# Patient Record
Sex: FEMALE | Race: WHITE | Employment: UNEMPLOYED | URBAN - METROPOLITAN AREA
[De-identification: names, ages, dates, MRNs, and addresses within clinical notes are randomized per-mention and may not be internally consistent; named-entity substitution may affect disease eponyms.]

---

## 2017-01-03 ENCOUNTER — HOSPITAL ENCOUNTER (OUTPATIENT)
Dept: NON INVASIVE DIAGNOSTICS | Age: 52
Discharge: HOME | End: 2017-01-03
Attending: UROLOGY | Admitting: UROLOGY

## 2017-01-03 DIAGNOSIS — N20.1 CALCULUS OF URETER: ICD-10-CM

## 2017-01-03 DIAGNOSIS — N20.0 CALCULUS OF KIDNEY: ICD-10-CM

## 2017-01-04 RX ORDER — LEVOTHYROXINE SODIUM 0.12 MG/1
TABLET ORAL
Qty: 90 TABLET | Refills: 0 | Status: SHIPPED | OUTPATIENT
Start: 2017-01-04 | End: 2017-04-12 | Stop reason: SDUPTHER

## 2017-01-05 ENCOUNTER — TELEPHONE (OUTPATIENT)
Dept: FAMILY MEDICINE CLINIC | Age: 52
End: 2017-01-05

## 2017-01-05 ENCOUNTER — OFFICE VISIT (OUTPATIENT)
Dept: FAMILY MEDICINE CLINIC | Age: 52
End: 2017-01-05

## 2017-01-05 VITALS
SYSTOLIC BLOOD PRESSURE: 160 MMHG | HEIGHT: 67 IN | DIASTOLIC BLOOD PRESSURE: 106 MMHG | HEART RATE: 72 BPM | TEMPERATURE: 97.9 F | WEIGHT: 196.2 LBS | BODY MASS INDEX: 30.79 KG/M2

## 2017-01-05 DIAGNOSIS — R00.2 PALPITATIONS: ICD-10-CM

## 2017-01-05 DIAGNOSIS — I10 ESSENTIAL HYPERTENSION: Primary | ICD-10-CM

## 2017-01-05 PROCEDURE — 93000 ELECTROCARDIOGRAM COMPLETE: CPT | Performed by: FAMILY MEDICINE

## 2017-01-05 PROCEDURE — 99213 OFFICE O/P EST LOW 20 MIN: CPT | Performed by: FAMILY MEDICINE

## 2017-01-05 ASSESSMENT — ENCOUNTER SYMPTOMS
EYE PAIN: 0
SHORTNESS OF BREATH: 0
VOMITING: 0
NAUSEA: 0
ABDOMINAL PAIN: 0

## 2017-01-31 ENCOUNTER — TELEPHONE (OUTPATIENT)
Dept: FAMILY MEDICINE CLINIC | Age: 52
End: 2017-01-31

## 2017-02-06 ENCOUNTER — OFFICE VISIT (OUTPATIENT)
Dept: FAMILY MEDICINE CLINIC | Age: 52
End: 2017-02-06

## 2017-02-06 VITALS
BODY MASS INDEX: 30.95 KG/M2 | HEIGHT: 67 IN | WEIGHT: 197.2 LBS | SYSTOLIC BLOOD PRESSURE: 136 MMHG | TEMPERATURE: 97.9 F | DIASTOLIC BLOOD PRESSURE: 80 MMHG | HEART RATE: 84 BPM

## 2017-02-06 DIAGNOSIS — J34.0 CELLULITIS OF NASAL TIP: ICD-10-CM

## 2017-02-06 DIAGNOSIS — I10 ESSENTIAL HYPERTENSION: Primary | ICD-10-CM

## 2017-02-06 PROCEDURE — 99213 OFFICE O/P EST LOW 20 MIN: CPT | Performed by: FAMILY MEDICINE

## 2017-02-06 RX ORDER — AMOXICILLIN AND CLAVULANATE POTASSIUM 875; 125 MG/1; MG/1
1 TABLET, FILM COATED ORAL 2 TIMES DAILY
Qty: 20 TABLET | Refills: 0 | Status: SHIPPED | OUTPATIENT
Start: 2017-02-06 | End: 2017-02-16

## 2017-02-06 RX ORDER — METOPROLOL TARTRATE 50 MG/1
50 TABLET, FILM COATED ORAL 2 TIMES DAILY
Qty: 180 TABLET | Refills: 0 | Status: SHIPPED | OUTPATIENT
Start: 2017-02-06 | End: 2017-05-15 | Stop reason: SDUPTHER

## 2017-02-06 ASSESSMENT — ENCOUNTER SYMPTOMS
SORE THROAT: 0
SHORTNESS OF BREATH: 0
NAUSEA: 0
VOMITING: 0
SINUS PRESSURE: 0
ABDOMINAL PAIN: 0
EYE PAIN: 0

## 2017-02-16 RX ORDER — PRAVASTATIN SODIUM 40 MG
TABLET ORAL
Qty: 90 TABLET | Refills: 0 | Status: SHIPPED | OUTPATIENT
Start: 2017-02-16 | End: 2017-05-15 | Stop reason: SDUPTHER

## 2017-03-09 ENCOUNTER — TELEPHONE (OUTPATIENT)
Dept: FAMILY MEDICINE CLINIC | Age: 52
End: 2017-03-09

## 2017-04-13 RX ORDER — LEVOTHYROXINE SODIUM 0.12 MG/1
TABLET ORAL
Qty: 90 TABLET | Refills: 0 | Status: SHIPPED | OUTPATIENT
Start: 2017-04-13 | End: 2017-07-05 | Stop reason: SDUPTHER

## 2017-05-15 RX ORDER — METOPROLOL TARTRATE 50 MG/1
TABLET, FILM COATED ORAL
Qty: 180 TABLET | Refills: 0 | Status: SHIPPED | OUTPATIENT
Start: 2017-05-15 | End: 2017-09-12 | Stop reason: SDUPTHER

## 2017-05-15 RX ORDER — PRAVASTATIN SODIUM 40 MG
TABLET ORAL
Qty: 90 TABLET | Refills: 0 | Status: SHIPPED | OUTPATIENT
Start: 2017-05-15 | End: 2017-08-10 | Stop reason: SDUPTHER

## 2017-07-05 RX ORDER — LEVOTHYROXINE SODIUM 0.12 MG/1
TABLET ORAL
Qty: 90 TABLET | Refills: 0 | Status: SHIPPED | OUTPATIENT
Start: 2017-07-05 | End: 2017-09-28 | Stop reason: SDUPTHER

## 2017-07-13 ENCOUNTER — OFFICE VISIT (OUTPATIENT)
Dept: FAMILY MEDICINE CLINIC | Age: 52
End: 2017-07-13

## 2017-07-13 VITALS
HEIGHT: 67 IN | DIASTOLIC BLOOD PRESSURE: 86 MMHG | WEIGHT: 197.4 LBS | SYSTOLIC BLOOD PRESSURE: 134 MMHG | TEMPERATURE: 97.7 F | HEART RATE: 72 BPM | BODY MASS INDEX: 30.98 KG/M2

## 2017-07-13 DIAGNOSIS — J01.00 ACUTE MAXILLARY SINUSITIS, RECURRENCE NOT SPECIFIED: Primary | ICD-10-CM

## 2017-07-13 PROCEDURE — 99213 OFFICE O/P EST LOW 20 MIN: CPT | Performed by: FAMILY MEDICINE

## 2017-07-13 RX ORDER — AMOXICILLIN AND CLAVULANATE POTASSIUM 875; 125 MG/1; MG/1
1 TABLET, FILM COATED ORAL 2 TIMES DAILY
Qty: 20 TABLET | Refills: 0 | Status: SHIPPED | OUTPATIENT
Start: 2017-07-13 | End: 2017-07-23

## 2017-07-13 RX ORDER — METHYLPREDNISOLONE 4 MG/1
TABLET ORAL
Qty: 1 KIT | Refills: 0 | Status: SHIPPED | OUTPATIENT
Start: 2017-07-13 | End: 2017-07-19

## 2017-07-13 ASSESSMENT — ENCOUNTER SYMPTOMS
WHEEZING: 0
SHORTNESS OF BREATH: 0
SINUS PRESSURE: 1
RHINORRHEA: 1
CHEST TIGHTNESS: 0
COUGH: 0

## 2017-08-10 RX ORDER — PRAVASTATIN SODIUM 40 MG
TABLET ORAL
Qty: 90 TABLET | Refills: 0 | Status: SHIPPED | OUTPATIENT
Start: 2017-08-10 | End: 2018-02-08 | Stop reason: ALTCHOICE

## 2017-09-07 ENCOUNTER — HOSPITAL ENCOUNTER (OUTPATIENT)
Dept: NON INVASIVE DIAGNOSTICS | Age: 52
Discharge: HOME | End: 2017-09-07
Attending: UROLOGY | Admitting: UROLOGY

## 2017-09-07 DIAGNOSIS — N20.0 CALCULUS OF KIDNEY: ICD-10-CM

## 2017-09-12 RX ORDER — METOPROLOL TARTRATE 50 MG/1
TABLET, FILM COATED ORAL
Qty: 180 TABLET | Refills: 0 | Status: SHIPPED | OUTPATIENT
Start: 2017-09-12 | End: 2017-12-06 | Stop reason: SDUPTHER

## 2017-09-28 DIAGNOSIS — R73.03 PREDIABETES: ICD-10-CM

## 2017-09-28 DIAGNOSIS — E78.5 HYPERLIPIDEMIA, UNSPECIFIED HYPERLIPIDEMIA TYPE: Primary | ICD-10-CM

## 2017-09-28 DIAGNOSIS — E03.9 HYPOTHYROIDISM, UNSPECIFIED TYPE: ICD-10-CM

## 2017-09-28 DIAGNOSIS — Z11.4 ENCOUNTER FOR SCREENING FOR HIV: ICD-10-CM

## 2017-09-29 RX ORDER — LEVOTHYROXINE SODIUM 0.12 MG/1
TABLET ORAL
Qty: 90 TABLET | Refills: 0 | Status: SHIPPED | OUTPATIENT
Start: 2017-09-29 | End: 2017-12-28 | Stop reason: SDUPTHER

## 2017-10-04 DIAGNOSIS — Z11.4 ENCOUNTER FOR SCREENING FOR HIV: ICD-10-CM

## 2017-10-04 DIAGNOSIS — R73.03 PREDIABETES: ICD-10-CM

## 2017-10-04 DIAGNOSIS — E78.5 HYPERLIPIDEMIA, UNSPECIFIED HYPERLIPIDEMIA TYPE: ICD-10-CM

## 2017-10-04 DIAGNOSIS — E03.9 HYPOTHYROIDISM, UNSPECIFIED TYPE: ICD-10-CM

## 2017-10-04 LAB
A/G RATIO: 1.5 (ref 1.1–2.2)
ALBUMIN SERPL-MCNC: 3.9 G/DL (ref 3.4–5)
ALP BLD-CCNC: 156 U/L (ref 40–129)
ALT SERPL-CCNC: 82 U/L (ref 10–40)
ANION GAP SERPL CALCULATED.3IONS-SCNC: 16 MMOL/L (ref 3–16)
AST SERPL-CCNC: 36 U/L (ref 15–37)
BILIRUB SERPL-MCNC: 0.8 MG/DL (ref 0–1)
BUN BLDV-MCNC: 14 MG/DL (ref 7–20)
CALCIUM SERPL-MCNC: 9.2 MG/DL (ref 8.3–10.6)
CHLORIDE BLD-SCNC: 96 MMOL/L (ref 99–110)
CHOLESTEROL, TOTAL: 184 MG/DL (ref 0–199)
CO2: 25 MMOL/L (ref 21–32)
CREAT SERPL-MCNC: 0.8 MG/DL (ref 0.6–1.1)
GFR AFRICAN AMERICAN: >60
GFR NON-AFRICAN AMERICAN: >60
GLOBULIN: 2.6 G/DL
GLUCOSE BLD-MCNC: 122 MG/DL (ref 70–99)
HDLC SERPL-MCNC: 32 MG/DL (ref 40–60)
LDL CHOLESTEROL CALCULATED: 99 MG/DL
POTASSIUM SERPL-SCNC: 4.6 MMOL/L (ref 3.5–5.1)
SODIUM BLD-SCNC: 137 MMOL/L (ref 136–145)
TOTAL PROTEIN: 6.5 G/DL (ref 6.4–8.2)
TRIGL SERPL-MCNC: 263 MG/DL (ref 0–150)
TSH REFLEX: 4.16 UIU/ML (ref 0.27–4.2)
VLDLC SERPL CALC-MCNC: 53 MG/DL

## 2017-10-05 LAB
ESTIMATED AVERAGE GLUCOSE: 131.2 MG/DL
HBA1C MFR BLD: 6.2 %
HIV-1 AND HIV-2 ANTIBODIES: NORMAL

## 2017-10-10 ENCOUNTER — HOSPITAL ENCOUNTER (OUTPATIENT)
Dept: NON INVASIVE DIAGNOSTICS | Age: 52
Discharge: HOME | End: 2017-10-10
Attending: FAMILY MEDICINE | Admitting: FAMILY MEDICINE

## 2017-10-10 ENCOUNTER — OFFICE VISIT (OUTPATIENT)
Dept: FAMILY MEDICINE CLINIC | Age: 52
End: 2017-10-10

## 2017-10-10 VITALS
BODY MASS INDEX: 31.11 KG/M2 | HEART RATE: 68 BPM | TEMPERATURE: 97.8 F | DIASTOLIC BLOOD PRESSURE: 84 MMHG | SYSTOLIC BLOOD PRESSURE: 126 MMHG | WEIGHT: 198.2 LBS | HEIGHT: 67 IN

## 2017-10-10 DIAGNOSIS — I10 ESSENTIAL HYPERTENSION: ICD-10-CM

## 2017-10-10 DIAGNOSIS — R79.89 ELEVATED LFTS: ICD-10-CM

## 2017-10-10 DIAGNOSIS — M79.641 BILATERAL HAND PAIN: ICD-10-CM

## 2017-10-10 DIAGNOSIS — M79.642 BILATERAL HAND PAIN: ICD-10-CM

## 2017-10-10 DIAGNOSIS — R73.03 PREDIABETES: ICD-10-CM

## 2017-10-10 DIAGNOSIS — E03.9 ACQUIRED HYPOTHYROIDISM: ICD-10-CM

## 2017-10-10 DIAGNOSIS — Z23 NEED FOR PROPHYLACTIC VACCINATION AGAINST DIPHTHERIA-TETANUS-PERTUSSIS (DTP): ICD-10-CM

## 2017-10-10 DIAGNOSIS — Z00.00 ROUTINE GENERAL MEDICAL EXAMINATION AT A HEALTH CARE FACILITY: Primary | ICD-10-CM

## 2017-10-10 DIAGNOSIS — E78.2 MIXED HYPERLIPIDEMIA: ICD-10-CM

## 2017-10-10 DIAGNOSIS — K76.0 FATTY LIVER DISEASE, NONALCOHOLIC: ICD-10-CM

## 2017-10-10 PROBLEM — N20.0 KIDNEY STONES: Status: ACTIVE | Noted: 2017-10-10

## 2017-10-10 PROCEDURE — 99396 PREV VISIT EST AGE 40-64: CPT | Performed by: FAMILY MEDICINE

## 2017-10-10 PROCEDURE — 90715 TDAP VACCINE 7 YRS/> IM: CPT | Performed by: FAMILY MEDICINE

## 2017-10-10 PROCEDURE — 90471 IMMUNIZATION ADMIN: CPT | Performed by: FAMILY MEDICINE

## 2017-10-10 RX ORDER — NITROFURANTOIN MACROCRYSTALS 50 MG/1
CAPSULE ORAL
COMMUNITY
Start: 2017-10-04 | End: 2018-02-15 | Stop reason: ALTCHOICE

## 2017-10-10 ASSESSMENT — ENCOUNTER SYMPTOMS
DIARRHEA: 0
WHEEZING: 0
COUGH: 0
EYE REDNESS: 0
NAUSEA: 0
CONSTIPATION: 0
EYE PAIN: 0
SHORTNESS OF BREATH: 0
ABDOMINAL PAIN: 0
SINUS PRESSURE: 0
COLOR CHANGE: 0
CHEST TIGHTNESS: 0
EYE DISCHARGE: 0
VOMITING: 0
RHINORRHEA: 0
BLOOD IN STOOL: 0

## 2017-10-10 ASSESSMENT — PATIENT HEALTH QUESTIONNAIRE - PHQ9
1. LITTLE INTEREST OR PLEASURE IN DOING THINGS: 0
SUM OF ALL RESPONSES TO PHQ9 QUESTIONS 1 & 2: 0
2. FEELING DOWN, DEPRESSED OR HOPELESS: 0
SUM OF ALL RESPONSES TO PHQ QUESTIONS 1-9: 0

## 2017-10-10 NOTE — PROGRESS NOTES
Subjective:      Patient ID: Meghana Castillo is a 45 y.o. female. HPI  Chief Complaint   Patient presents with    Annual Exam     annual physical exam    Hand Pain     b/l hand/wrist pain x 2 years. aware of addt charge. Here for CPE. Non smoker. Taking all meds. utd on dental and vision exams and gyn exam  Seeing urology- has a stuck kidney stone. No exercise  Takes all meds  No weight change  C/o hand pain on and off x 2yrs. Happens when plays piano. Pain in all fingers and down into wrist- swells at times. Denies numbness or tingling or hands falling asleep  Meghana Castillo is a 45 y.o. female with the following history as recorded in [facility]:  Patient Active Problem List    Diagnosis Date Noted    Prediabetes 05/28/2015    RUQ pain 10/30/2014    Routine general medical examination at a health care facility 11/13/2013    Vitamin D deficiency 04/16/2013    DDD (degenerative disc disease), lumbar 03/30/2011    Fatty liver disease, nonalcoholic 92/49/5322    Hypothyroid     Hyperlipidemia     AR (allergic rhinitis)     Migraine      Current Outpatient Prescriptions   Medication Sig Dispense Refill    nitrofurantoin (MACRODANTIN) 50 MG capsule       levothyroxine (SYNTHROID) 125 MCG tablet TAKE 1 TABLET BY MOUTH EVERY DAY 90 tablet 0    metoprolol tartrate (LOPRESSOR) 50 MG tablet TAKE 1 TABLET BY MOUTH TWICE DAILY 180 tablet 0    pravastatin (PRAVACHOL) 40 MG tablet TAKE 1 TABLET BY MOUTH EVERY DAY 90 tablet 0    triamcinolone (NASACORT ALLERGY 24HR) 55 MCG/ACT nasal inhaler 2 sprays by Nasal route daily      Cholecalciferol (VITAMIN D PO) Take  by mouth daily.  aspirin 81 MG tablet Take 81 mg by mouth daily       No current facility-administered medications for this visit. Allergies: Niaspan [niacin er]; Percocet [oxycodone-acetaminophen]; Skelaxin [metaxalone];  Sulfamethoxazole-trimethoprim; Ciprofloxacin; and Omnicef  Past Medical History:   Diagnosis Date    Abdominal pain,

## 2017-10-10 NOTE — PATIENT INSTRUCTIONS
Please call your pharmacy if you need any refills of your medication(s). Please call our office at [phone] if you don't hear from us about your test results. Bring an accurate list of your medications with you at every appointment to ensure that we have the correct information. Our office hours are: Monday - Friday 7 am- 5 pm; Saturdays vary on doctor working.     Phone lines turn on at 8 am.

## 2017-11-08 RX ORDER — PRAVASTATIN SODIUM 40 MG
TABLET ORAL
Qty: 90 TABLET | Refills: 0 | Status: SHIPPED | OUTPATIENT
Start: 2017-11-08 | End: 2018-02-08 | Stop reason: ALTCHOICE

## 2017-12-07 RX ORDER — METOPROLOL TARTRATE 50 MG/1
TABLET, FILM COATED ORAL
Qty: 180 TABLET | Refills: 0 | Status: SHIPPED | OUTPATIENT
Start: 2017-12-07 | End: 2018-05-28 | Stop reason: SDUPTHER

## 2017-12-09 ENCOUNTER — TELEPHONE (OUTPATIENT)
Dept: INTERNAL MEDICINE CLINIC | Age: 52
End: 2017-12-09

## 2017-12-28 ENCOUNTER — TELEPHONE (OUTPATIENT)
Dept: FAMILY MEDICINE CLINIC | Age: 52
End: 2017-12-28

## 2017-12-28 RX ORDER — LEVOTHYROXINE SODIUM 0.12 MG/1
TABLET ORAL
Qty: 90 TABLET | Refills: 0 | Status: SHIPPED | OUTPATIENT
Start: 2017-12-28 | End: 2018-03-28 | Stop reason: SDUPTHER

## 2017-12-28 NOTE — TELEPHONE ENCOUNTER
Pt was under Dr Kendrick cavanaugh and you see her   Christal Corcoran [birth year], would you be willing to see her also.      Please advise , [phone]

## 2018-02-08 RX ORDER — PRAVASTATIN SODIUM 40 MG
TABLET ORAL
Qty: 90 TABLET | Refills: 1 | Status: SHIPPED | OUTPATIENT
Start: 2018-02-08 | End: 2018-07-30 | Stop reason: SDUPTHER

## 2018-02-15 ENCOUNTER — OFFICE VISIT (OUTPATIENT)
Dept: FAMILY MEDICINE CLINIC | Age: 53
End: 2018-02-15

## 2018-02-15 VITALS
DIASTOLIC BLOOD PRESSURE: 84 MMHG | HEIGHT: 67 IN | TEMPERATURE: 97.8 F | BODY MASS INDEX: 31.33 KG/M2 | SYSTOLIC BLOOD PRESSURE: 122 MMHG | WEIGHT: 199.6 LBS

## 2018-02-15 DIAGNOSIS — E55.9 VITAMIN D DEFICIENCY: ICD-10-CM

## 2018-02-15 DIAGNOSIS — G47.9 SLEEP DISORDER: ICD-10-CM

## 2018-02-15 DIAGNOSIS — E78.2 MIXED HYPERLIPIDEMIA: Primary | ICD-10-CM

## 2018-02-15 DIAGNOSIS — K76.0 FATTY LIVER DISEASE, NONALCOHOLIC: ICD-10-CM

## 2018-02-15 DIAGNOSIS — R73.09 ELEVATED GLUCOSE: ICD-10-CM

## 2018-02-15 DIAGNOSIS — I10 ESSENTIAL HYPERTENSION: ICD-10-CM

## 2018-02-15 PROCEDURE — 3014F SCREEN MAMMO DOC REV: CPT | Performed by: FAMILY MEDICINE

## 2018-02-15 PROCEDURE — 99214 OFFICE O/P EST MOD 30 MIN: CPT | Performed by: FAMILY MEDICINE

## 2018-02-15 PROCEDURE — G8427 DOCREV CUR MEDS BY ELIG CLIN: HCPCS | Performed by: FAMILY MEDICINE

## 2018-02-15 PROCEDURE — 1036F TOBACCO NON-USER: CPT | Performed by: FAMILY MEDICINE

## 2018-02-15 PROCEDURE — G8484 FLU IMMUNIZE NO ADMIN: HCPCS | Performed by: FAMILY MEDICINE

## 2018-02-15 PROCEDURE — 3017F COLORECTAL CA SCREEN DOC REV: CPT | Performed by: FAMILY MEDICINE

## 2018-02-15 PROCEDURE — G8417 CALC BMI ABV UP PARAM F/U: HCPCS | Performed by: FAMILY MEDICINE

## 2018-02-15 ASSESSMENT — ENCOUNTER SYMPTOMS
CONSTIPATION: 0
VOMITING: 0
ABDOMINAL PAIN: 0
COUGH: 0
CHEST TIGHTNESS: 0
BLOOD IN STOOL: 0
DIARRHEA: 0
ABDOMINAL DISTENTION: 0
NAUSEA: 0
SHORTNESS OF BREATH: 0

## 2018-02-22 ENCOUNTER — OFFICE VISIT (OUTPATIENT)
Dept: SLEEP MEDICINE | Age: 53
End: 2018-02-22

## 2018-02-22 VITALS
SYSTOLIC BLOOD PRESSURE: 138 MMHG | RESPIRATION RATE: 16 BRPM | DIASTOLIC BLOOD PRESSURE: 84 MMHG | OXYGEN SATURATION: 96 % | BODY MASS INDEX: 31.3 KG/M2 | TEMPERATURE: 98 F | HEART RATE: 84 BPM | WEIGHT: 199.4 LBS | HEIGHT: 67 IN

## 2018-02-22 DIAGNOSIS — G47.33 OSA (OBSTRUCTIVE SLEEP APNEA): Primary | ICD-10-CM

## 2018-02-22 PROCEDURE — 3017F COLORECTAL CA SCREEN DOC REV: CPT | Performed by: PSYCHIATRY & NEUROLOGY

## 2018-02-22 PROCEDURE — 1036F TOBACCO NON-USER: CPT | Performed by: PSYCHIATRY & NEUROLOGY

## 2018-02-22 PROCEDURE — G8427 DOCREV CUR MEDS BY ELIG CLIN: HCPCS | Performed by: PSYCHIATRY & NEUROLOGY

## 2018-02-22 PROCEDURE — 3014F SCREEN MAMMO DOC REV: CPT | Performed by: PSYCHIATRY & NEUROLOGY

## 2018-02-22 PROCEDURE — G8484 FLU IMMUNIZE NO ADMIN: HCPCS | Performed by: PSYCHIATRY & NEUROLOGY

## 2018-02-22 PROCEDURE — G8417 CALC BMI ABV UP PARAM F/U: HCPCS | Performed by: PSYCHIATRY & NEUROLOGY

## 2018-02-22 PROCEDURE — 99204 OFFICE O/P NEW MOD 45 MIN: CPT | Performed by: PSYCHIATRY & NEUROLOGY

## 2018-02-22 ASSESSMENT — SLEEP AND FATIGUE QUESTIONNAIRES
HOW LIKELY ARE YOU TO NOD OFF OR FALL ASLEEP WHILE SITTING AND READING: 3
HOW LIKELY ARE YOU TO NOD OFF OR FALL ASLEEP WHILE LYING DOWN TO REST IN THE AFTERNOON WHEN CIRCUMSTANCES PERMIT: 3
HOW LIKELY ARE YOU TO NOD OFF OR FALL ASLEEP WHILE WATCHING TV: 2
HOW LIKELY ARE YOU TO NOD OFF OR FALL ASLEEP WHILE SITTING INACTIVE IN A PUBLIC PLACE: 1
NECK CIRCUMFERENCE (INCHES): 16
HOW LIKELY ARE YOU TO NOD OFF OR FALL ASLEEP WHEN YOU ARE A PASSENGER IN A CAR FOR AN HOUR WITHOUT A BREAK: 1
HOW LIKELY ARE YOU TO NOD OFF OR FALL ASLEEP WHILE SITTING QUIETLY AFTER LUNCH WITHOUT ALCOHOL: 1
HOW LIKELY ARE YOU TO NOD OFF OR FALL ASLEEP WHILE SITTING AND TALKING TO SOMEONE: 0
HOW LIKELY ARE YOU TO NOD OFF OR FALL ASLEEP IN A CAR, WHILE STOPPED FOR A FEW MINUTES IN TRAFFIC: 0
ESS TOTAL SCORE: 11

## 2018-02-22 ASSESSMENT — ENCOUNTER SYMPTOMS
ALLERGIC/IMMUNOLOGIC NEGATIVE: 1
EYES NEGATIVE: 1
CHOKING: 1
GASTROINTESTINAL NEGATIVE: 1

## 2018-02-22 NOTE — PROGRESS NOTES
Ciprofloxacin Rash 03/06/2013    Omnicef Rash 03/30/2011       Patient Active Problem List   Diagnosis    Hypothyroid    Hyperlipidemia    AR (allergic rhinitis)    Migraine    DDD (degenerative disc disease), lumbar    Fatty liver disease, nonalcoholic    Vitamin D deficiency    Routine general medical examination at a health care facility    RUQ pain    Prediabetes    Essential hypertension    Kidney stones       Past Medical History:   Diagnosis Date    Abdominal pain, other specified site     AR (allergic rhinitis)     Diarrhea     Hyperlipidemia     Impaired fasting glucose     Migraine     Rectal bleeding     Reflux gastritis     Thyroid disease     hypothyroidism       Past Surgical History:   Procedure Laterality Date    CYSTOURETHROSCOPY/STENT REMOVAL      HYSTERECTOMY  8-    KIDNEY SURGERY      KNEE ARTHROSCOPY      RIGHT    LITHOTRIPSY      URETER STENT PLACEMENT      URETEROSCOPY  09/26/2017       Family History   Problem Relation Age of Onset    Adopted: Yes    Diabetes Mother     Heart Disease Father     Cancer Brother      colon       Review of Systems   Constitutional: Positive for fatigue and unexpected weight change (20 pounds weight gain in the last 5years). HENT: Positive for congestion. Eyes: Negative. Respiratory: Positive for choking. Cardiovascular: Negative. Gastrointestinal: Negative. Endocrine: Negative. Genitourinary: Positive for frequency (nighttime). Musculoskeletal: Positive for arthralgias and myalgias. Skin: Negative. Allergic/Immunologic: Negative. Neurological: Positive for headaches. Hematological: Negative. Psychiatric/Behavioral: Positive for agitation and dysphoric mood. The patient is nervous/anxious. All other systems reviewed and are negative.       Objective:     Vitals:  Weight BMI Neck circumference    Wt Readings from Last 3 Encounters:   02/22/18 199 lb 6.4 oz (90.4 kg)   02/15/18 199 lb 9.6 oz (90.5 kg)   10/10/17 198 lb 3.2 oz (89.9 kg)    Body mass index is 31.23 kg/m². Neck circumference: 16     BP HR SaO2   BP Readings from Last 3 Encounters:   02/22/18 138/84   02/15/18 122/84   10/10/17 126/84    Pulse Readings from Last 3 Encounters:   02/22/18 84   10/10/17 68   07/13/17 72    SpO2 Readings from Last 3 Encounters:   02/22/18 96%   06/14/16 98%   11/25/13 98%        The mandibular molar Class :   [x]1 []2 []3      Mallampati I Airway Classification:   []1 []2 [x]3 []4        Physical Exam   Constitutional: No distress. HENT:   Mallampati class 3, no retrognathia or hypognathia , normal airflow in bilateral nostrils, no septum deviation , crowded oropharynx with low soft palate, high arched hard palate,no tonsils enlargement. Eyes: EOM are normal.   Neck: Normal range of motion. Neck supple. No tracheal deviation present. No thyromegaly present. Cardiovascular: Normal rate, normal heart sounds and intact distal pulses. Pulmonary/Chest: Effort normal and breath sounds normal. No respiratory distress. She has no wheezes. Musculoskeletal: Normal range of motion. She exhibits no edema or tenderness. Neurological: She is alert. She has normal reflexes. Skin: Skin is warm. Psychiatric: She has a normal mood and affect. Nursing note and vitals reviewed. Assessment:    Obstructive sleep apnea especially with snoring, snorting,   daytime sleepiness, large neck circumference, Mallampati class of 3 and BMI 31            1. DAVID (obstructive sleep apnea)  Baseline Diagnostic Sleep Study    Sleep Study with PAP Titration     Plan:     Patient was counseled about the pathophysiology of obstructive sleep apnea syndrome and the methods for evaluating its presence and severity. Patient was counseled to avoid driving and other potentially hazardous circumstances if the patient is experiencing excessive sleepiness.   Treatment considerations include the use of nasal CPAP, oral dental appliance or a surgical intervention, which should be based on otolarygologic findings, In the meantime, the patient should be cautioned to avoid the use of alcohol or other depressant medications because of potential for increasing the duration and severity of apnea and cautioned regarding driving or operating and dangerous equipment if the patient is experiencing daytime sleepiness. .        Orders Placed This Encounter   Procedures    Baseline Diagnostic Sleep Study    Sleep Study with PAP Titration       Return in about 4 months (around 6/22/2018) for to review the PSG and CPAP usage.     Edmond Villalta MD  Medical Director [address] [place] [address]

## 2018-03-05 DIAGNOSIS — R73.09 ELEVATED GLUCOSE: ICD-10-CM

## 2018-03-05 DIAGNOSIS — E55.9 VITAMIN D DEFICIENCY: ICD-10-CM

## 2018-03-05 LAB
ESTIMATED AVERAGE GLUCOSE: 137 MG/DL
GLUCOSE BLD-MCNC: 136 MG/DL (ref 70–99)
HBA1C MFR BLD: 6.4 %
VITAMIN D 25-HYDROXY: 53.2 NG/ML

## 2018-04-17 ENCOUNTER — OFFICE VISIT (OUTPATIENT)
Dept: FAMILY MEDICINE CLINIC | Age: 53
End: 2018-04-17

## 2018-04-17 VITALS
WEIGHT: 201 LBS | HEIGHT: 67 IN | DIASTOLIC BLOOD PRESSURE: 84 MMHG | BODY MASS INDEX: 31.55 KG/M2 | SYSTOLIC BLOOD PRESSURE: 122 MMHG | TEMPERATURE: 97.5 F

## 2018-04-17 DIAGNOSIS — J01.10 ACUTE NON-RECURRENT FRONTAL SINUSITIS: Primary | ICD-10-CM

## 2018-04-17 PROCEDURE — 1036F TOBACCO NON-USER: CPT | Performed by: FAMILY MEDICINE

## 2018-04-17 PROCEDURE — G8427 DOCREV CUR MEDS BY ELIG CLIN: HCPCS | Performed by: FAMILY MEDICINE

## 2018-04-17 PROCEDURE — G8417 CALC BMI ABV UP PARAM F/U: HCPCS | Performed by: FAMILY MEDICINE

## 2018-04-17 PROCEDURE — 3014F SCREEN MAMMO DOC REV: CPT | Performed by: FAMILY MEDICINE

## 2018-04-17 PROCEDURE — 3017F COLORECTAL CA SCREEN DOC REV: CPT | Performed by: FAMILY MEDICINE

## 2018-04-17 PROCEDURE — 99213 OFFICE O/P EST LOW 20 MIN: CPT | Performed by: FAMILY MEDICINE

## 2018-04-17 RX ORDER — AMOXICILLIN AND CLAVULANATE POTASSIUM 500; 125 MG/1; MG/1
1 TABLET, FILM COATED ORAL 2 TIMES DAILY
Qty: 20 TABLET | Refills: 0 | Status: SHIPPED | OUTPATIENT
Start: 2018-04-17 | End: 2018-04-27

## 2018-05-29 RX ORDER — METOPROLOL TARTRATE 50 MG/1
TABLET, FILM COATED ORAL
Qty: 180 TABLET | Refills: 1 | Status: SHIPPED | OUTPATIENT
Start: 2018-05-29 | End: 2019-06-06 | Stop reason: SDUPTHER

## 2018-07-09 ENCOUNTER — OFFICE VISIT (OUTPATIENT)
Dept: FAMILY MEDICINE CLINIC | Age: 53
End: 2018-07-09

## 2018-07-09 ENCOUNTER — TELEPHONE (OUTPATIENT)
Dept: FAMILY MEDICINE CLINIC | Age: 53
End: 2018-07-09

## 2018-07-09 VITALS
BODY MASS INDEX: 31.83 KG/M2 | DIASTOLIC BLOOD PRESSURE: 82 MMHG | TEMPERATURE: 98 F | WEIGHT: 202.8 LBS | HEIGHT: 67 IN | SYSTOLIC BLOOD PRESSURE: 132 MMHG

## 2018-07-09 DIAGNOSIS — R73.03 PREDIABETES: ICD-10-CM

## 2018-07-09 DIAGNOSIS — E78.2 MIXED HYPERLIPIDEMIA: Primary | ICD-10-CM

## 2018-07-09 DIAGNOSIS — I10 ESSENTIAL HYPERTENSION: ICD-10-CM

## 2018-07-09 DIAGNOSIS — J06.9 UPPER RESPIRATORY TRACT INFECTION, UNSPECIFIED TYPE: Primary | ICD-10-CM

## 2018-07-09 DIAGNOSIS — E03.9 ACQUIRED HYPOTHYROIDISM: ICD-10-CM

## 2018-07-09 PROCEDURE — G8427 DOCREV CUR MEDS BY ELIG CLIN: HCPCS | Performed by: FAMILY MEDICINE

## 2018-07-09 PROCEDURE — 1036F TOBACCO NON-USER: CPT | Performed by: FAMILY MEDICINE

## 2018-07-09 PROCEDURE — G8417 CALC BMI ABV UP PARAM F/U: HCPCS | Performed by: FAMILY MEDICINE

## 2018-07-09 PROCEDURE — 99213 OFFICE O/P EST LOW 20 MIN: CPT | Performed by: FAMILY MEDICINE

## 2018-07-09 PROCEDURE — 3017F COLORECTAL CA SCREEN DOC REV: CPT | Performed by: FAMILY MEDICINE

## 2018-07-09 RX ORDER — AZITHROMYCIN 250 MG/1
TABLET, FILM COATED ORAL
Qty: 1 PACKET | Refills: 0 | Status: SHIPPED | OUTPATIENT
Start: 2018-07-09 | End: 2018-07-19

## 2018-07-09 NOTE — PROGRESS NOTES
Subjective:      Patient ID: Jaya Hameed is a 52 y.o. female. Patient presents with:  Congestion: stuffy head, drainage, cough, sore throat x 2 weeks    Exposed to ill relatives at the onset  Productive cough no blood  pnd and nasal d/c  Facial pain   No fever  No tobacco  Used otc with some help but the sx persists    YOB: 1965    Date of Visit:  7/9/2018     -- Niaspan (Niacin Er) -- Hives   -- Percocet (Oxycodone-Acetaminophen) -- Other (See Comments)    --  Dropped her blood pressure to low   -- Skelaxin (Metaxalone) -- Hives   -- Sulfamethoxazole-Trimethoprim -- Hives   -- Ciprofloxacin -- Rash   -- Omnicef -- Rash    Current Outpatient Prescriptions:  metoprolol tartrate (LOPRESSOR) 50 MG tablet, TAKE 1 TABLET BY MOUTH TWICE DAILY, Disp: 180 tablet, Rfl: 1  levothyroxine (SYNTHROID) 125 MCG tablet, TAKE 1 TABLET BY MOUTH EVERY DAY, Disp: 90 tablet, Rfl: 1  Cholecalciferol (VITAMIN D3) 5000 units TABS, Take by mouth Daily, Disp: , Rfl:   pravastatin (PRAVACHOL) 40 MG tablet, TAKE 1 TABLET BY MOUTH EVERY DAY, Disp: 90 tablet, Rfl: 1  aspirin 81 MG tablet, Take 81 mg by mouth daily, Disp: , Rfl:   triamcinolone (NASACORT ALLERGY 24HR) 55 MCG/ACT nasal inhaler, 2 sprays by Nasal route daily, Disp: , Rfl:     No current facility-administered medications for this visit.       --------------------------------                  07/09/18                           1053           --------------------------------   BP:             132/82           Site:          Left Arm          Position:       Sitting           Cuff Size:     Large Adult         Temp:      98 °F (36.7 °C)       TempSrc:         Oral            Weight: 202 lb 12.8 oz (92 kg)   Height:    5' 7\" (1.702 m)      --------------------------------  Body mass index is 31.76 kg/m².      Wt Readings from Last 3 Encounters:  07/09/18 : 202 lb 12.8 oz (92 kg)  04/17/18 : 201 lb (91.2 kg)  02/22/18 : 199 lb

## 2018-07-30 RX ORDER — PRAVASTATIN SODIUM 40 MG
TABLET ORAL
Qty: 90 TABLET | Refills: 1 | Status: SHIPPED | OUTPATIENT
Start: 2018-07-30 | End: 2018-11-15 | Stop reason: ALTCHOICE

## 2018-11-02 RX ORDER — LEVOTHYROXINE SODIUM 0.12 MG/1
TABLET ORAL
Qty: 90 TABLET | Refills: 0 | OUTPATIENT
Start: 2018-11-02

## 2018-11-08 ENCOUNTER — HOSPITAL ENCOUNTER (OUTPATIENT)
Age: 53
Discharge: HOME | End: 2018-11-08
Payer: COMMERCIAL

## 2018-11-08 ENCOUNTER — HOSPITAL ENCOUNTER (OUTPATIENT)
Dept: GENERAL RADIOLOGY | Age: 53
Discharge: HOME | End: 2018-11-08
Payer: COMMERCIAL

## 2018-11-08 ENCOUNTER — OFFICE VISIT (OUTPATIENT)
Dept: FAMILY MEDICINE CLINIC | Age: 53
End: 2018-11-08
Payer: COMMERCIAL

## 2018-11-08 VITALS
BODY MASS INDEX: 31.42 KG/M2 | TEMPERATURE: 97.6 F | SYSTOLIC BLOOD PRESSURE: 122 MMHG | HEART RATE: 72 BPM | DIASTOLIC BLOOD PRESSURE: 80 MMHG | HEIGHT: 67 IN | WEIGHT: 200.2 LBS

## 2018-11-08 DIAGNOSIS — J18.9 PNEUMONIA OF RIGHT LOWER LOBE DUE TO INFECTIOUS ORGANISM: ICD-10-CM

## 2018-11-08 DIAGNOSIS — Z12.11 COLON CANCER SCREENING: ICD-10-CM

## 2018-11-08 DIAGNOSIS — E03.9 ACQUIRED HYPOTHYROIDISM: ICD-10-CM

## 2018-11-08 DIAGNOSIS — I10 ESSENTIAL HYPERTENSION: ICD-10-CM

## 2018-11-08 DIAGNOSIS — E78.2 MIXED HYPERLIPIDEMIA: ICD-10-CM

## 2018-11-08 DIAGNOSIS — R73.03 PREDIABETES: ICD-10-CM

## 2018-11-08 DIAGNOSIS — Z00.00 WELL ADULT EXAM: Primary | ICD-10-CM

## 2018-11-08 PROCEDURE — G8484 FLU IMMUNIZE NO ADMIN: HCPCS | Performed by: FAMILY MEDICINE

## 2018-11-08 PROCEDURE — 71046 X-RAY EXAM CHEST 2 VIEWS: CPT

## 2018-11-08 PROCEDURE — 99396 PREV VISIT EST AGE 40-64: CPT | Performed by: FAMILY MEDICINE

## 2018-11-08 RX ORDER — AZITHROMYCIN 500 MG/1
500 TABLET, FILM COATED ORAL DAILY
Qty: 5 TABLET | Refills: 0 | Status: SHIPPED | OUTPATIENT
Start: 2018-11-08 | End: 2018-11-13

## 2018-11-08 ASSESSMENT — PATIENT HEALTH QUESTIONNAIRE - PHQ9
1. LITTLE INTEREST OR PLEASURE IN DOING THINGS: 0
SUM OF ALL RESPONSES TO PHQ QUESTIONS 1-9: 0
2. FEELING DOWN, DEPRESSED OR HOPELESS: 0
SUM OF ALL RESPONSES TO PHQ9 QUESTIONS 1 & 2: 0
SUM OF ALL RESPONSES TO PHQ QUESTIONS 1-9: 0

## 2018-11-08 ASSESSMENT — ENCOUNTER SYMPTOMS
VOICE CHANGE: 0
ABDOMINAL PAIN: 0
CHEST TIGHTNESS: 0
NAUSEA: 0
VOMITING: 0
DIARRHEA: 0
CONSTIPATION: 0
SHORTNESS OF BREATH: 0
TROUBLE SWALLOWING: 0
BLOOD IN STOOL: 0
ABDOMINAL DISTENTION: 0

## 2018-11-08 NOTE — PATIENT INSTRUCTIONS
Do see the specialists for the colon check  Get the chest film  Take all the antibiotic  Call if any problem  Consider getting the new shingle vaccine called Shingrix  See in 6 months

## 2018-11-10 DIAGNOSIS — E03.9 ACQUIRED HYPOTHYROIDISM: ICD-10-CM

## 2018-11-10 DIAGNOSIS — R73.03 PREDIABETES: ICD-10-CM

## 2018-11-10 DIAGNOSIS — E78.2 MIXED HYPERLIPIDEMIA: ICD-10-CM

## 2018-11-10 DIAGNOSIS — I10 ESSENTIAL HYPERTENSION: ICD-10-CM

## 2018-11-10 LAB
A/G RATIO: 1.8 (ref 1.1–2.2)
ALBUMIN SERPL-MCNC: 4.6 G/DL (ref 3.4–5)
ALP BLD-CCNC: 168 U/L (ref 40–129)
ALT SERPL-CCNC: 81 U/L (ref 10–40)
ANION GAP SERPL CALCULATED.3IONS-SCNC: 11 MMOL/L (ref 3–16)
AST SERPL-CCNC: 59 U/L (ref 15–37)
BACTERIA: ABNORMAL /HPF
BILIRUB SERPL-MCNC: 0.7 MG/DL (ref 0–1)
BILIRUBIN URINE: NEGATIVE
BLOOD, URINE: NEGATIVE
BUN BLDV-MCNC: 16 MG/DL (ref 7–20)
CALCIUM SERPL-MCNC: 10.4 MG/DL (ref 8.3–10.6)
CHLORIDE BLD-SCNC: 101 MMOL/L (ref 99–110)
CHOLESTEROL, TOTAL: 199 MG/DL (ref 0–199)
CLARITY: CLEAR
CO2: 27 MMOL/L (ref 21–32)
COLOR: YELLOW
CREAT SERPL-MCNC: 0.8 MG/DL (ref 0.6–1.1)
EPITHELIAL CELLS, UA: 1 /HPF (ref 0–5)
GFR AFRICAN AMERICAN: >60
GFR NON-AFRICAN AMERICAN: >60
GLOBULIN: 2.5 G/DL
GLUCOSE BLD-MCNC: 172 MG/DL (ref 70–99)
GLUCOSE URINE: NEGATIVE MG/DL
HDLC SERPL-MCNC: 46 MG/DL (ref 40–60)
HYALINE CASTS: 0 /LPF (ref 0–8)
KETONES, URINE: NEGATIVE MG/DL
LDL CHOLESTEROL CALCULATED: 106 MG/DL
LEUKOCYTE ESTERASE, URINE: ABNORMAL
MICROSCOPIC EXAMINATION: YES
NITRITE, URINE: NEGATIVE
PH UA: 6
POTASSIUM SERPL-SCNC: 4.9 MMOL/L (ref 3.5–5.1)
PROTEIN UA: NEGATIVE MG/DL
RBC UA: 6 /HPF (ref 0–4)
SODIUM BLD-SCNC: 139 MMOL/L (ref 136–145)
SPECIFIC GRAVITY UA: 1.02
T4 FREE: 2.1 NG/DL (ref 0.9–1.8)
TOTAL PROTEIN: 7.1 G/DL (ref 6.4–8.2)
TRIGL SERPL-MCNC: 235 MG/DL (ref 0–150)
TSH SERPL DL<=0.05 MIU/L-ACNC: 0.21 UIU/ML (ref 0.27–4.2)
UROBILINOGEN, URINE: 0.2 E.U./DL
VLDLC SERPL CALC-MCNC: 47 MG/DL
WBC UA: 9 /HPF (ref 0–5)

## 2018-11-11 LAB
ESTIMATED AVERAGE GLUCOSE: 159.9 MG/DL
HBA1C MFR BLD: 7.2 %

## 2018-11-15 ENCOUNTER — OFFICE VISIT (OUTPATIENT)
Dept: FAMILY MEDICINE CLINIC | Age: 53
End: 2018-11-15
Payer: COMMERCIAL

## 2018-11-15 ENCOUNTER — HOSPITAL ENCOUNTER (OUTPATIENT)
Dept: GENERAL RADIOLOGY | Age: 53
Discharge: HOME | End: 2018-11-15
Payer: COMMERCIAL

## 2018-11-15 ENCOUNTER — HOSPITAL ENCOUNTER (OUTPATIENT)
Age: 53
Discharge: HOME | End: 2018-11-15
Payer: COMMERCIAL

## 2018-11-15 VITALS
DIASTOLIC BLOOD PRESSURE: 82 MMHG | WEIGHT: 198.4 LBS | SYSTOLIC BLOOD PRESSURE: 122 MMHG | BODY MASS INDEX: 31.14 KG/M2 | HEIGHT: 67 IN | TEMPERATURE: 98.1 F

## 2018-11-15 DIAGNOSIS — E11.9 TYPE 2 DIABETES MELLITUS WITHOUT COMPLICATION, WITHOUT LONG-TERM CURRENT USE OF INSULIN (HCC): Primary | ICD-10-CM

## 2018-11-15 DIAGNOSIS — N20.0 CALCULUS OF KIDNEY: ICD-10-CM

## 2018-11-15 PROCEDURE — 74018 RADEX ABDOMEN 1 VIEW: CPT

## 2018-11-15 PROCEDURE — 99212 OFFICE O/P EST SF 10 MIN: CPT | Performed by: FAMILY MEDICINE

## 2018-11-15 PROCEDURE — G8417 CALC BMI ABV UP PARAM F/U: HCPCS | Performed by: FAMILY MEDICINE

## 2018-11-15 PROCEDURE — 3045F PR MOST RECENT HEMOGLOBIN A1C LEVEL 7.0-9.0%: CPT | Performed by: FAMILY MEDICINE

## 2018-11-15 PROCEDURE — 3017F COLORECTAL CA SCREEN DOC REV: CPT | Performed by: FAMILY MEDICINE

## 2018-11-15 PROCEDURE — G8484 FLU IMMUNIZE NO ADMIN: HCPCS | Performed by: FAMILY MEDICINE

## 2018-11-15 PROCEDURE — 1036F TOBACCO NON-USER: CPT | Performed by: FAMILY MEDICINE

## 2018-11-15 PROCEDURE — G8427 DOCREV CUR MEDS BY ELIG CLIN: HCPCS | Performed by: FAMILY MEDICINE

## 2018-11-15 PROCEDURE — 2022F DILAT RTA XM EVC RTNOPTHY: CPT | Performed by: FAMILY MEDICINE

## 2018-11-15 RX ORDER — ROSUVASTATIN CALCIUM 5 MG/1
5 TABLET, COATED ORAL DAILY
Qty: 90 TABLET | Refills: 1 | Status: SHIPPED | OUTPATIENT
Start: 2018-11-15 | End: 2019-05-01 | Stop reason: SDUPTHER

## 2018-11-15 RX ORDER — METFORMIN HYDROCHLORIDE 500 MG/1
500 TABLET, EXTENDED RELEASE ORAL
Qty: 90 TABLET | Refills: 1 | Status: SHIPPED | OUTPATIENT
Start: 2018-11-15 | End: 2019-05-11 | Stop reason: SDUPTHER

## 2018-11-15 RX ORDER — LEVOTHYROXINE SODIUM 112 UG/1
112 TABLET ORAL DAILY
Qty: 90 TABLET | Refills: 1 | Status: SHIPPED | OUTPATIENT
Start: 2018-11-15 | End: 2019-04-08

## 2018-11-23 ENCOUNTER — TELEPHONE (OUTPATIENT)
Dept: PHARMACY | Age: 53
End: 2018-11-23

## 2018-11-23 NOTE — TELEPHONE ENCOUNTER
Left message for patient to please call to schedule appointment in the [facility] for DMM visit.

## 2018-12-03 ENCOUNTER — HOSPITAL ENCOUNTER (OUTPATIENT)
Dept: DIABETES SERVICES | Age: 53
Setting detail: THERAPIES SERIES
Discharge: HOME | End: 2018-12-03
Payer: COMMERCIAL

## 2018-12-03 DIAGNOSIS — E11.9 TYPE 2 DIABETES MELLITUS WITHOUT COMPLICATION, WITHOUT LONG-TERM CURRENT USE OF INSULIN (HCC): Primary | ICD-10-CM

## 2018-12-03 PROCEDURE — G0108 DIAB MANAGE TRN  PER INDIV: HCPCS | Performed by: DIETITIAN, REGISTERED

## 2018-12-03 ASSESSMENT — SLEEP AND FATIGUE QUESTIONNAIRES
HAVE YOU EVER BEEN TESTED FOR SLEEP APNEA: NO
HOW DO YOU RATE THE QUALITY OF YOUR SLEEP: GOOD
HAVE YOU BEEN TOLD, OR NOTICED ON YOUR OWN, THAT YOU STOP BREATHING OR STRUGGLE TO BREATHE IN YOUR SLEEP: NO
HOW MANY HOURS OF SLEEP ARE YOU GETTING, ON AVERAGE: 7 OR MORE

## 2018-12-03 ASSESSMENT — PROBLEM AREAS IN DIABETES QUESTIONNAIRE (PAID)
WORRYING ABOUT THE FUTURE AND THE POSSIBILITY OF SERIOUS COMPLICATIONS: 4
COPING WITH COMPLICATIONS OF DIABETES: 1
FEELING SCARED WHEN YOU THINK ABOUT LIVING WITH DIABETES: 4
FEELING DEPRESSED WHEN YOU THINK ABOUT LIVING WITH DIABETES: 3
FEELING THAT DIABETES IS TAKING UP TOO MUCH OF YOUR MENTAL AND PHYSICAL ENERGY EVERY DAY: 2
PAID-5 TOTAL SCORE: 14

## 2018-12-04 ENCOUNTER — SCHEDULED TELEPHONE ENCOUNTER (OUTPATIENT)
Dept: PHARMACY | Age: 53
End: 2018-12-04
Payer: COMMERCIAL

## 2018-12-11 ENCOUNTER — SCHEDULED TELEPHONE ENCOUNTER (OUTPATIENT)
Dept: PHARMACY | Age: 53
End: 2018-12-11
Payer: COMMERCIAL

## 2018-12-12 ENCOUNTER — OFFICE VISIT (OUTPATIENT)
Dept: FAMILY MEDICINE CLINIC | Age: 53
End: 2018-12-12
Payer: COMMERCIAL

## 2018-12-12 VITALS
DIASTOLIC BLOOD PRESSURE: 78 MMHG | WEIGHT: 192.6 LBS | HEIGHT: 67 IN | SYSTOLIC BLOOD PRESSURE: 120 MMHG | BODY MASS INDEX: 30.23 KG/M2 | TEMPERATURE: 97.5 F

## 2018-12-12 DIAGNOSIS — R30.0 DYSURIA: ICD-10-CM

## 2018-12-12 LAB
BACTERIA URINE, POC: ABNORMAL
BILIRUBIN URINE: ABNORMAL MG/DL
BLOOD, URINE: POSITIVE
CASTS URINE, POC: ABNORMAL
CLARITY: CLEAR
COLOR: YELLOW
CRYSTALS URINE, POC: ABNORMAL
EPI CELLS URINE, POC: ABNORMAL
GLUCOSE URINE: ABNORMAL
KETONES, URINE: POSITIVE
LEUKOCYTE EST, POC: ABNORMAL
NITRITE, URINE: NEGATIVE
PH UA: 6 (ref 4.5–8)
PROTEIN UA: POSITIVE
RBC URINE, POC: ABNORMAL
SPECIFIC GRAVITY UA: 1.02 (ref 1–1.03)
UROBILINOGEN, URINE: NORMAL
WBC URINE, POC: ABNORMAL
YEAST URINE, POC: ABNORMAL

## 2018-12-12 PROCEDURE — G8427 DOCREV CUR MEDS BY ELIG CLIN: HCPCS | Performed by: INTERNAL MEDICINE

## 2018-12-12 PROCEDURE — 81000 URINALYSIS NONAUTO W/SCOPE: CPT | Performed by: INTERNAL MEDICINE

## 2018-12-12 PROCEDURE — G8484 FLU IMMUNIZE NO ADMIN: HCPCS | Performed by: INTERNAL MEDICINE

## 2018-12-12 PROCEDURE — G8417 CALC BMI ABV UP PARAM F/U: HCPCS | Performed by: INTERNAL MEDICINE

## 2018-12-12 PROCEDURE — 1036F TOBACCO NON-USER: CPT | Performed by: INTERNAL MEDICINE

## 2018-12-12 PROCEDURE — 99213 OFFICE O/P EST LOW 20 MIN: CPT | Performed by: INTERNAL MEDICINE

## 2018-12-12 PROCEDURE — 3017F COLORECTAL CA SCREEN DOC REV: CPT | Performed by: INTERNAL MEDICINE

## 2018-12-12 ASSESSMENT — ENCOUNTER SYMPTOMS
SHORTNESS OF BREATH: 0
ABDOMINAL PAIN: 0
COUGH: 0
SINUS PAIN: 0
STRIDOR: 0
RHINORRHEA: 0

## 2018-12-31 ENCOUNTER — SCHEDULED TELEPHONE ENCOUNTER (OUTPATIENT)
Dept: PHARMACY | Age: 53
End: 2018-12-31
Payer: COMMERCIAL

## 2018-12-31 ENCOUNTER — TELEPHONE (OUTPATIENT)
Dept: PHARMACY | Age: 53
End: 2018-12-31

## 2018-12-31 NOTE — TELEPHONE ENCOUNTER
Left another message for patient to please call to schedule an appointment in the [facility] for DM.

## 2019-03-18 ENCOUNTER — OFFICE VISIT (OUTPATIENT)
Dept: FAMILY MEDICINE CLINIC | Age: 54
End: 2019-03-18
Payer: COMMERCIAL

## 2019-03-18 VITALS
DIASTOLIC BLOOD PRESSURE: 84 MMHG | SYSTOLIC BLOOD PRESSURE: 128 MMHG | HEIGHT: 67 IN | TEMPERATURE: 97.9 F | WEIGHT: 189.9 LBS | BODY MASS INDEX: 29.81 KG/M2

## 2019-03-18 DIAGNOSIS — E03.9 ACQUIRED HYPOTHYROIDISM: ICD-10-CM

## 2019-03-18 DIAGNOSIS — E11.9 TYPE 2 DIABETES MELLITUS WITHOUT COMPLICATION, WITHOUT LONG-TERM CURRENT USE OF INSULIN (HCC): ICD-10-CM

## 2019-03-18 DIAGNOSIS — J01.90 ACUTE NON-RECURRENT SINUSITIS, UNSPECIFIED LOCATION: Primary | ICD-10-CM

## 2019-03-18 PROCEDURE — 2022F DILAT RTA XM EVC RTNOPTHY: CPT | Performed by: FAMILY MEDICINE

## 2019-03-18 PROCEDURE — 1036F TOBACCO NON-USER: CPT | Performed by: FAMILY MEDICINE

## 2019-03-18 PROCEDURE — 99213 OFFICE O/P EST LOW 20 MIN: CPT | Performed by: FAMILY MEDICINE

## 2019-03-18 PROCEDURE — G8427 DOCREV CUR MEDS BY ELIG CLIN: HCPCS | Performed by: FAMILY MEDICINE

## 2019-03-18 PROCEDURE — 3046F HEMOGLOBIN A1C LEVEL >9.0%: CPT | Performed by: FAMILY MEDICINE

## 2019-03-18 PROCEDURE — G8484 FLU IMMUNIZE NO ADMIN: HCPCS | Performed by: FAMILY MEDICINE

## 2019-03-18 PROCEDURE — 3017F COLORECTAL CA SCREEN DOC REV: CPT | Performed by: FAMILY MEDICINE

## 2019-03-18 PROCEDURE — G8417 CALC BMI ABV UP PARAM F/U: HCPCS | Performed by: FAMILY MEDICINE

## 2019-03-18 RX ORDER — AZITHROMYCIN 250 MG/1
TABLET, FILM COATED ORAL
Qty: 1 PACKET | Refills: 0 | Status: SHIPPED | OUTPATIENT
Start: 2019-03-18 | End: 2019-03-28

## 2019-03-18 ASSESSMENT — PATIENT HEALTH QUESTIONNAIRE - PHQ9
SUM OF ALL RESPONSES TO PHQ9 QUESTIONS 1 & 2: 0
SUM OF ALL RESPONSES TO PHQ QUESTIONS 1-9: 0
1. LITTLE INTEREST OR PLEASURE IN DOING THINGS: 0
SUM OF ALL RESPONSES TO PHQ QUESTIONS 1-9: 0
2. FEELING DOWN, DEPRESSED OR HOPELESS: 0

## 2019-04-05 DIAGNOSIS — E11.9 TYPE 2 DIABETES MELLITUS WITHOUT COMPLICATION, WITHOUT LONG-TERM CURRENT USE OF INSULIN (HCC): ICD-10-CM

## 2019-04-05 DIAGNOSIS — E03.9 ACQUIRED HYPOTHYROIDISM: ICD-10-CM

## 2019-04-05 LAB
A/G RATIO: 1.3 (ref 1.1–2.2)
ALBUMIN SERPL-MCNC: 4.1 G/DL (ref 3.4–5)
ALP BLD-CCNC: 149 U/L (ref 40–129)
ALT SERPL-CCNC: 17 U/L (ref 10–40)
ANION GAP SERPL CALCULATED.3IONS-SCNC: 11 MMOL/L (ref 3–16)
AST SERPL-CCNC: 24 U/L (ref 15–37)
BILIRUB SERPL-MCNC: 0.5 MG/DL (ref 0–1)
BUN BLDV-MCNC: 13 MG/DL (ref 7–20)
CALCIUM SERPL-MCNC: 9.6 MG/DL (ref 8.3–10.6)
CHLORIDE BLD-SCNC: 107 MMOL/L (ref 99–110)
CHOLESTEROL, TOTAL: 179 MG/DL (ref 0–199)
CO2: 22 MMOL/L (ref 21–32)
CREAT SERPL-MCNC: 0.7 MG/DL (ref 0.6–1.1)
ESTIMATED AVERAGE GLUCOSE: 116.9 MG/DL
GFR AFRICAN AMERICAN: >60
GFR NON-AFRICAN AMERICAN: >60
GLOBULIN: 3.2 G/DL
GLUCOSE BLD-MCNC: 125 MG/DL (ref 70–99)
HBA1C MFR BLD: 5.7 %
HDLC SERPL-MCNC: 44 MG/DL (ref 40–60)
LDL CHOLESTEROL CALCULATED: 96 MG/DL
POTASSIUM SERPL-SCNC: 4.4 MMOL/L (ref 3.5–5.1)
SODIUM BLD-SCNC: 140 MMOL/L (ref 136–145)
T4 FREE: 1.8 NG/DL (ref 0.9–1.8)
TOTAL PROTEIN: 7.3 G/DL (ref 6.4–8.2)
TRIGL SERPL-MCNC: 195 MG/DL (ref 0–150)
TSH SERPL DL<=0.05 MIU/L-ACNC: 0.1 UIU/ML (ref 0.27–4.2)
VLDLC SERPL CALC-MCNC: 39 MG/DL

## 2019-04-08 RX ORDER — LEVOTHYROXINE SODIUM 0.1 MG/1
100 TABLET ORAL DAILY
Qty: 90 TABLET | Refills: 1 | Status: SHIPPED | OUTPATIENT
Start: 2019-04-08 | End: 2019-10-03 | Stop reason: SDUPTHER

## 2019-05-02 RX ORDER — ROSUVASTATIN CALCIUM 5 MG/1
5 TABLET, COATED ORAL DAILY
Qty: 90 TABLET | Refills: 1 | Status: SHIPPED | OUTPATIENT
Start: 2019-05-02 | End: 2019-07-27 | Stop reason: SDUPTHER

## 2019-06-07 RX ORDER — METOPROLOL TARTRATE 50 MG/1
TABLET, FILM COATED ORAL
Qty: 180 TABLET | Refills: 0 | Status: SHIPPED | OUTPATIENT
Start: 2019-06-07 | End: 2019-11-30 | Stop reason: SDUPTHER

## 2019-07-29 RX ORDER — ROSUVASTATIN CALCIUM 5 MG/1
5 TABLET, COATED ORAL DAILY
Qty: 90 TABLET | Refills: 0 | Status: SHIPPED | OUTPATIENT
Start: 2019-07-29 | End: 2019-11-21

## 2019-08-26 ENCOUNTER — OFFICE VISIT (OUTPATIENT)
Dept: FAMILY MEDICINE CLINIC | Age: 54
End: 2019-08-26
Payer: COMMERCIAL

## 2019-08-26 VITALS
SYSTOLIC BLOOD PRESSURE: 118 MMHG | HEIGHT: 67 IN | WEIGHT: 184 LBS | TEMPERATURE: 97.7 F | BODY MASS INDEX: 28.88 KG/M2 | DIASTOLIC BLOOD PRESSURE: 84 MMHG

## 2019-08-26 DIAGNOSIS — J01.00 ACUTE NON-RECURRENT MAXILLARY SINUSITIS: Primary | ICD-10-CM

## 2019-08-26 PROCEDURE — 99213 OFFICE O/P EST LOW 20 MIN: CPT | Performed by: FAMILY MEDICINE

## 2019-08-26 PROCEDURE — G8427 DOCREV CUR MEDS BY ELIG CLIN: HCPCS | Performed by: FAMILY MEDICINE

## 2019-08-26 PROCEDURE — 1036F TOBACCO NON-USER: CPT | Performed by: FAMILY MEDICINE

## 2019-08-26 PROCEDURE — 3017F COLORECTAL CA SCREEN DOC REV: CPT | Performed by: FAMILY MEDICINE

## 2019-08-26 PROCEDURE — G8417 CALC BMI ABV UP PARAM F/U: HCPCS | Performed by: FAMILY MEDICINE

## 2019-08-26 RX ORDER — AZITHROMYCIN 250 MG/1
TABLET, FILM COATED ORAL
Qty: 1 PACKET | Refills: 0 | Status: SHIPPED | OUTPATIENT
Start: 2019-08-26 | End: 2019-09-05

## 2019-08-26 NOTE — PROGRESS NOTES
Subjective:      Patient ID: Dwayne Wright is a 62 y.o. female. Patient presents with:  Congestion: sinus drainage, sore throat, red eyes x 2 weeks    No fever  gettng a d/c both eyes  itchy and irritated eyes  Sinus congestion and pain  pnd  No cough  Using a ilotycin ointment in the eyes and better already today    YOB: 1965    Date of Visit:  8/26/2019     -- Niaspan (Niacin Er) -- Hives   -- Percocet (Oxycodone-Acetaminophen) -- Other (See Comments)    --  Dropped her blood pressure to low   -- Skelaxin (Metaxalone) -- Hives   -- Sulfamethoxazole-Trimethoprim -- Hives   -- Ciprofloxacin -- Rash   -- Omnicef -- Rash    Current Outpatient Medications:  rosuvastatin (CRESTOR) 5 MG tablet, TAKE 1 TABLET BY MOUTH DAILY, Disp: 90 tablet, Rfl: 0  metoprolol tartrate (LOPRESSOR) 50 MG tablet, TAKE 1 TABLET BY MOUTH TWICE DAILY, Disp: 180 tablet, Rfl: 0  metFORMIN (GLUCOPHAGE-XR) 500 MG extended release tablet, TAKE 1 TABLET BY MOUTH DAILY WITH BREAKFAST, Disp: 90 tablet, Rfl: 2  levothyroxine (SYNTHROID) 100 MCG tablet, Take 1 tablet by mouth Daily, Disp: 90 tablet, Rfl: 1  VITAMIN E BLEND PO, Take by mouth, Disp: , Rfl:   Cholecalciferol (VITAMIN D3) 5000 units TABS, Take by mouth Daily, Disp: , Rfl:   aspirin 81 MG tablet, Take 81 mg by mouth daily, Disp: , Rfl:   triamcinolone (NASACORT ALLERGY 24HR) 55 MCG/ACT nasal inhaler, 2 sprays by Nasal route daily, Disp: , Rfl:     No current facility-administered medications for this visit.       ---------------------------               08/26/19                      1610         ---------------------------   BP:          118/84         Site:    Left Upper Arm     Position:     Sitting        Cuff Size:   Large Adult      Temp:   97.7 °F (36.5 °C)   TempSrc:      Oral          Weight: 184 lb (83.5 kg)    Height:  5' 7\" (1.702 m)   ---------------------------  Body mass index is 28.82 kg/m².      Wt Readings from Last 3 Richmond Pace MD

## 2019-10-03 ENCOUNTER — OFFICE VISIT (OUTPATIENT)
Dept: FAMILY MEDICINE CLINIC | Age: 54
End: 2019-10-03
Payer: COMMERCIAL

## 2019-10-03 VITALS
SYSTOLIC BLOOD PRESSURE: 120 MMHG | HEART RATE: 68 BPM | TEMPERATURE: 98.5 F | BODY MASS INDEX: 29.19 KG/M2 | DIASTOLIC BLOOD PRESSURE: 82 MMHG | WEIGHT: 186 LBS | HEIGHT: 67 IN

## 2019-10-03 DIAGNOSIS — H02.403 PTOSIS OF BOTH EYELIDS: ICD-10-CM

## 2019-10-03 DIAGNOSIS — I10 ESSENTIAL HYPERTENSION: ICD-10-CM

## 2019-10-03 DIAGNOSIS — E78.2 MIXED HYPERLIPIDEMIA: ICD-10-CM

## 2019-10-03 DIAGNOSIS — Z01.818 PREOP EXAMINATION: Primary | ICD-10-CM

## 2019-10-03 DIAGNOSIS — R73.09 ELEVATED GLUCOSE: ICD-10-CM

## 2019-10-03 DIAGNOSIS — E03.9 ACQUIRED HYPOTHYROIDISM: ICD-10-CM

## 2019-10-03 PROCEDURE — G8417 CALC BMI ABV UP PARAM F/U: HCPCS | Performed by: FAMILY MEDICINE

## 2019-10-03 PROCEDURE — G8484 FLU IMMUNIZE NO ADMIN: HCPCS | Performed by: FAMILY MEDICINE

## 2019-10-03 PROCEDURE — G8427 DOCREV CUR MEDS BY ELIG CLIN: HCPCS | Performed by: FAMILY MEDICINE

## 2019-10-03 PROCEDURE — 99212 OFFICE O/P EST SF 10 MIN: CPT | Performed by: FAMILY MEDICINE

## 2019-10-03 RX ORDER — LEVOTHYROXINE SODIUM 0.1 MG/1
100 TABLET ORAL DAILY
Qty: 90 TABLET | Refills: 0 | Status: SHIPPED | OUTPATIENT
Start: 2019-10-03 | End: 2019-12-17 | Stop reason: SDUPTHER

## 2019-10-03 ASSESSMENT — ENCOUNTER SYMPTOMS
VOICE CHANGE: 0
BLOOD IN STOOL: 0
SHORTNESS OF BREATH: 0
TROUBLE SWALLOWING: 0
SORE THROAT: 0
CHEST TIGHTNESS: 0
DIARRHEA: 0
ABDOMINAL PAIN: 0
EYE DISCHARGE: 0
EYE PAIN: 0
COUGH: 0
ABDOMINAL DISTENTION: 0
CONSTIPATION: 0
NAUSEA: 0
VOMITING: 0

## 2019-10-09 ENCOUNTER — HOSPITAL ENCOUNTER (OUTPATIENT)
Age: 54
Discharge: HOME | End: 2019-10-09
Payer: COMMERCIAL

## 2019-10-09 DIAGNOSIS — I10 ESSENTIAL HYPERTENSION: ICD-10-CM

## 2019-10-09 DIAGNOSIS — E03.9 ACQUIRED HYPOTHYROIDISM: ICD-10-CM

## 2019-10-09 DIAGNOSIS — Z01.818 PREOP EXAMINATION: ICD-10-CM

## 2019-10-09 DIAGNOSIS — E78.2 MIXED HYPERLIPIDEMIA: ICD-10-CM

## 2019-10-09 DIAGNOSIS — R73.09 ELEVATED GLUCOSE: ICD-10-CM

## 2019-10-09 LAB
A/G RATIO: 1.9 (ref 1.1–2.2)
ALBUMIN SERPL-MCNC: 4.9 G/DL (ref 3.4–5)
ALP BLD-CCNC: 169 U/L (ref 40–129)
ALT SERPL-CCNC: 25 U/L (ref 10–40)
ANION GAP SERPL CALCULATED.3IONS-SCNC: 14 MMOL/L (ref 3–16)
AST SERPL-CCNC: 30 U/L (ref 15–37)
BILIRUB SERPL-MCNC: 0.7 MG/DL (ref 0–1)
BUN BLDV-MCNC: 14 MG/DL (ref 7–20)
CALCIUM SERPL-MCNC: 10.2 MG/DL (ref 8.3–10.6)
CHLORIDE BLD-SCNC: 101 MMOL/L (ref 99–110)
CHOLESTEROL, TOTAL: 210 MG/DL (ref 0–199)
CO2: 25 MMOL/L (ref 21–32)
CREAT SERPL-MCNC: 0.9 MG/DL (ref 0.6–1.1)
EKG ATRIAL RATE: 67 BPM
EKG DIAGNOSIS: NORMAL
EKG P AXIS: 52 DEGREES
EKG P-R INTERVAL: 186 MS
EKG Q-T INTERVAL: 410 MS
EKG QRS DURATION: 84 MS
EKG QTC CALCULATION (BAZETT): 433 MS
EKG R AXIS: -20 DEGREES
EKG T AXIS: 29 DEGREES
EKG VENTRICULAR RATE: 67 BPM
ESTIMATED AVERAGE GLUCOSE: 119.8 MG/DL
GFR AFRICAN AMERICAN: >60
GFR NON-AFRICAN AMERICAN: >60
GLOBULIN: 2.6 G/DL
GLUCOSE BLD-MCNC: 117 MG/DL (ref 70–99)
HBA1C MFR BLD: 5.8 %
HDLC SERPL-MCNC: 60 MG/DL (ref 40–60)
LDL CHOLESTEROL CALCULATED: 106 MG/DL
POTASSIUM SERPL-SCNC: 5.1 MMOL/L (ref 3.5–5.1)
SODIUM BLD-SCNC: 140 MMOL/L (ref 136–145)
T4 FREE: 1.3 NG/DL (ref 0.9–1.8)
TOTAL PROTEIN: 7.5 G/DL (ref 6.4–8.2)
TRIGL SERPL-MCNC: 221 MG/DL (ref 0–150)
TSH SERPL DL<=0.05 MIU/L-ACNC: 7.73 UIU/ML (ref 0.27–4.2)
VLDLC SERPL CALC-MCNC: 44 MG/DL

## 2019-10-09 PROCEDURE — 93005 ELECTROCARDIOGRAM TRACING: CPT

## 2019-10-09 PROCEDURE — 93010 ELECTROCARDIOGRAM REPORT: CPT | Performed by: INTERNAL MEDICINE

## 2019-11-21 ENCOUNTER — TELEPHONE (OUTPATIENT)
Dept: FAMILY MEDICINE CLINIC | Age: 54
End: 2019-11-21

## 2019-11-21 RX ORDER — ROSUVASTATIN CALCIUM 10 MG/1
10 TABLET, COATED ORAL DAILY
Qty: 90 TABLET | Refills: 1 | Status: SHIPPED | OUTPATIENT
Start: 2019-11-21 | End: 2020-04-30

## 2019-12-18 ENCOUNTER — TELEPHONE (OUTPATIENT)
Dept: FAMILY MEDICINE CLINIC | Age: 54
End: 2019-12-18

## 2019-12-18 DIAGNOSIS — E03.9 ACQUIRED HYPOTHYROIDISM: Primary | ICD-10-CM

## 2019-12-18 DIAGNOSIS — R79.89 ABNORMAL THYROID BLOOD TEST: ICD-10-CM

## 2019-12-19 RX ORDER — LEVOTHYROXINE SODIUM 0.1 MG/1
100 TABLET ORAL DAILY
Qty: 90 TABLET | Refills: 0 | Status: SHIPPED | OUTPATIENT
Start: 2019-12-19 | End: 2020-04-09

## 2019-12-20 DIAGNOSIS — R79.89 ABNORMAL THYROID BLOOD TEST: ICD-10-CM

## 2019-12-20 DIAGNOSIS — E03.9 ACQUIRED HYPOTHYROIDISM: ICD-10-CM

## 2019-12-20 LAB
T4 FREE: 1.9 NG/DL (ref 0.9–1.8)
TSH SERPL DL<=0.05 MIU/L-ACNC: 3.21 UIU/ML (ref 0.27–4.2)

## 2019-12-23 ENCOUNTER — TELEPHONE (OUTPATIENT)
Dept: FAMILY MEDICINE CLINIC | Age: 54
End: 2019-12-23

## 2019-12-23 DIAGNOSIS — E05.90 HYPERTHYROIDISM: ICD-10-CM

## 2019-12-23 DIAGNOSIS — R79.89 ABNORMAL THYROID BLOOD TEST: Primary | ICD-10-CM

## 2020-02-12 ENCOUNTER — OFFICE VISIT (OUTPATIENT)
Dept: FAMILY MEDICINE CLINIC | Age: 55
End: 2020-02-12
Payer: COMMERCIAL

## 2020-02-12 VITALS
SYSTOLIC BLOOD PRESSURE: 114 MMHG | TEMPERATURE: 98.4 F | BODY MASS INDEX: 29.57 KG/M2 | HEIGHT: 67 IN | WEIGHT: 188.4 LBS | DIASTOLIC BLOOD PRESSURE: 80 MMHG

## 2020-02-12 PROCEDURE — G8484 FLU IMMUNIZE NO ADMIN: HCPCS | Performed by: INTERNAL MEDICINE

## 2020-02-12 PROCEDURE — 3017F COLORECTAL CA SCREEN DOC REV: CPT | Performed by: INTERNAL MEDICINE

## 2020-02-12 PROCEDURE — 99213 OFFICE O/P EST LOW 20 MIN: CPT | Performed by: INTERNAL MEDICINE

## 2020-02-12 PROCEDURE — G8417 CALC BMI ABV UP PARAM F/U: HCPCS | Performed by: INTERNAL MEDICINE

## 2020-02-12 PROCEDURE — G8427 DOCREV CUR MEDS BY ELIG CLIN: HCPCS | Performed by: INTERNAL MEDICINE

## 2020-02-12 PROCEDURE — 1036F TOBACCO NON-USER: CPT | Performed by: INTERNAL MEDICINE

## 2020-02-12 RX ORDER — AZITHROMYCIN 250 MG/1
250 TABLET, FILM COATED ORAL SEE ADMIN INSTRUCTIONS
Qty: 6 TABLET | Refills: 0 | Status: SHIPPED | OUTPATIENT
Start: 2020-02-12 | End: 2020-02-17

## 2020-02-12 ASSESSMENT — ENCOUNTER SYMPTOMS
SINUS PRESSURE: 1
COUGH: 0
CONSTIPATION: 0
ANAL BLEEDING: 0
SINUS PAIN: 1
APNEA: 0
RHINORRHEA: 1
ABDOMINAL PAIN: 0
BLOOD IN STOOL: 0

## 2020-02-12 ASSESSMENT — PATIENT HEALTH QUESTIONNAIRE - PHQ9
1. LITTLE INTEREST OR PLEASURE IN DOING THINGS: 0
SUM OF ALL RESPONSES TO PHQ QUESTIONS 1-9: 0
2. FEELING DOWN, DEPRESSED OR HOPELESS: 0
SUM OF ALL RESPONSES TO PHQ QUESTIONS 1-9: 0
SUM OF ALL RESPONSES TO PHQ9 QUESTIONS 1 & 2: 0

## 2020-02-12 NOTE — PROGRESS NOTES
Subjective:      Patient ID: Carlotta Stevens is a 62 y.o. female. HPI   Chief Complaint   Patient presents with    Sinusitis     patient c/o head congestion x 1 week; dizziness today; sinus drainage. patient denies cough, sore throat, fever     Carlotta Stevens is a 62 y.o. female with the [facility]:  Patient Active Problem List    Diagnosis Date Noted    Dysuria 12/12/2018    Essential hypertension 10/10/2017    Kidney stones 10/10/2017    Prediabetes 05/28/2015    RUQ pain 10/30/2014    Vitamin D deficiency 04/16/2013    DDD (degenerative disc disease), lumbar 03/30/2011    Fatty liver disease, nonalcoholic 53/46/5285    Hypothyroid     Hyperlipidemia     AR (allergic rhinitis)     Migraine      Current Outpatient Medications   Medication Sig Dispense Refill    levothyroxine (SYNTHROID) 100 MCG tablet TAKE 1 TABLET BY MOUTH DAILY 90 tablet 0    metoprolol tartrate (LOPRESSOR) 50 MG tablet TAKE 1 TABLET BY MOUTH TWICE DAILY 180 tablet 1    rosuvastatin (CRESTOR) 10 MG tablet Take 1 tablet by mouth daily 90 tablet 1    metFORMIN (GLUCOPHAGE-XR) 500 MG extended release tablet TAKE 1 TABLET BY MOUTH DAILY WITH BREAKFAST 90 tablet 2    VITAMIN E BLEND PO Take by mouth      Cholecalciferol (VITAMIN D3) 5000 units TABS Take by mouth Daily      aspirin 81 MG tablet Take 81 mg by mouth daily      triamcinolone (NASACORT ALLERGY 24HR) 55 MCG/ACT nasal inhaler 2 sprays by Nasal route daily       No current facility-administered medications for this visit. Allergies: Niaspan [niacin er]; Percocet [oxycodone-acetaminophen]; Skelaxin [metaxalone];  Sulfamethoxazole-trimethoprim; Ciprofloxacin; and Omnicef  Past Medical History:   Diagnosis Date    Abdominal pain, other specified site     AR (allergic rhinitis)     Diarrhea     Hyperlipidemia     Impaired fasting glucose     Migraine     Rectal bleeding     Reflux gastritis     Thyroid disease hypothyroidism     Past Surgical History:   Procedure Laterality Date    COLONOSCOPY  11/26/2013    normal dr Meghana Akins, hemorrhoid and diverticula    COLONOSCOPY  02/05/2019    dr Momo Leong, small polyp, repeat 5 years    CYSTOURETHROSCOPY/STENT REMOVAL      HYSTERECTOMY  8-    KIDNEY SURGERY      KNEE ARTHROSCOPY      RIGHT    LITHOTRIPSY      UPPER GASTROINTESTINAL ENDOSCOPY  11/26/2013    normal dr Meghana Akins. gastritis    URETER STENT PLACEMENT      URETEROSCOPY  09/26/2017     Family History   Adopted: Yes   Problem Relation Age of Onset    Diabetes Mother     Heart Disease Father     Cancer Brother         colon     Social History     Tobacco Use    Smoking status: Never Smoker    Smokeless tobacco: Never Used   Substance Use Topics    Alcohol use: Yes     Alcohol/week: 1.0 standard drinks     Types: 1 Glasses of wine per week     Comment: once a month       Review of Systems   Constitutional: Negative for chills, diaphoresis and fatigue. HENT: Positive for congestion, postnasal drip, rhinorrhea, sinus pressure and sinus pain. Eyes: Negative for visual disturbance. Respiratory: Negative for apnea and cough. Cardiovascular: Negative for chest pain and palpitations. Gastrointestinal: Negative for abdominal pain, anal bleeding, blood in stool and constipation. Genitourinary: Negative for dysuria and frequency. Musculoskeletal: Negative for arthralgias and myalgias. Objective:   Physical Exam  Constitutional:       Appearance: Normal appearance. HENT:      Head: Normocephalic and atraumatic. Comments: Edema bilat. nasal turbinates with drainage . Cardiovascular:      Rate and Rhythm: Normal rate. Heart sounds: No murmur. Pulmonary:      Effort: No respiratory distress. Breath sounds: No wheezing. Abdominal:      General: There is no distension. Tenderness: There is no abdominal tenderness. Skin:     Coloration: Skin is not jaundiced.    Neurological: Mental Status: She is alert. Assessment:       Diagnosis Orders   1. Acute bacterial sinusitis           Plan:      Outpatient Encounter Medications as of 2/12/2020   Medication Sig Dispense Refill    azithromycin (ZITHROMAX) 250 MG tablet Take 1 tablet by mouth See Admin Instructions for 5 days 500mg on day 1 followed by 250mg on days 2 - 5 6 tablet 0    levothyroxine (SYNTHROID) 100 MCG tablet TAKE 1 TABLET BY MOUTH DAILY 90 tablet 0    metoprolol tartrate (LOPRESSOR) 50 MG tablet TAKE 1 TABLET BY MOUTH TWICE DAILY 180 tablet 1    rosuvastatin (CRESTOR) 10 MG tablet Take 1 tablet by mouth daily 90 tablet 1    metFORMIN (GLUCOPHAGE-XR) 500 MG extended release tablet TAKE 1 TABLET BY MOUTH DAILY WITH BREAKFAST 90 tablet 2    VITAMIN E BLEND PO Take by mouth      Cholecalciferol (VITAMIN D3) 5000 units TABS Take by mouth Daily      aspirin 81 MG tablet Take 81 mg by mouth daily      triamcinolone (NASACORT ALLERGY 24HR) 55 MCG/ACT nasal inhaler 2 sprays by Nasal route daily       No facility-administered encounter medications on file as of 2/12/2020. No orders of the defined types were placed in this encounter.           Jorge FLOOD DO

## 2020-03-03 RX ORDER — METFORMIN HYDROCHLORIDE 500 MG/1
500 TABLET, EXTENDED RELEASE ORAL
Qty: 90 TABLET | Refills: 0 | Status: SHIPPED | OUTPATIENT
Start: 2020-03-03 | End: 2020-05-22 | Stop reason: SINTOL

## 2020-03-05 ENCOUNTER — OFFICE VISIT (OUTPATIENT)
Dept: FAMILY MEDICINE CLINIC | Age: 55
End: 2020-03-05
Payer: COMMERCIAL

## 2020-03-05 VITALS
WEIGHT: 185 LBS | HEIGHT: 67 IN | TEMPERATURE: 97.3 F | SYSTOLIC BLOOD PRESSURE: 120 MMHG | BODY MASS INDEX: 29.03 KG/M2 | HEART RATE: 80 BPM | DIASTOLIC BLOOD PRESSURE: 82 MMHG

## 2020-03-05 DIAGNOSIS — I10 ESSENTIAL HYPERTENSION: ICD-10-CM

## 2020-03-05 DIAGNOSIS — Z01.818 PREOP EXAMINATION: ICD-10-CM

## 2020-03-05 DIAGNOSIS — E11.9 TYPE 2 DIABETES MELLITUS WITHOUT COMPLICATION, WITHOUT LONG-TERM CURRENT USE OF INSULIN (HCC): ICD-10-CM

## 2020-03-05 LAB
ANION GAP SERPL CALCULATED.3IONS-SCNC: 15 MMOL/L (ref 3–16)
BUN BLDV-MCNC: 19 MG/DL (ref 7–20)
CALCIUM SERPL-MCNC: 10 MG/DL (ref 8.3–10.6)
CHLORIDE BLD-SCNC: 102 MMOL/L (ref 99–110)
CO2: 22 MMOL/L (ref 21–32)
CREAT SERPL-MCNC: 0.7 MG/DL (ref 0.6–1.1)
GFR AFRICAN AMERICAN: >60
GFR NON-AFRICAN AMERICAN: >60
GLUCOSE BLD-MCNC: 161 MG/DL (ref 70–99)
POTASSIUM SERPL-SCNC: 4.3 MMOL/L (ref 3.5–5.1)
SODIUM BLD-SCNC: 139 MMOL/L (ref 136–145)

## 2020-03-05 PROCEDURE — 99212 OFFICE O/P EST SF 10 MIN: CPT | Performed by: FAMILY MEDICINE

## 2020-03-05 PROCEDURE — G8417 CALC BMI ABV UP PARAM F/U: HCPCS | Performed by: FAMILY MEDICINE

## 2020-03-05 PROCEDURE — G8484 FLU IMMUNIZE NO ADMIN: HCPCS | Performed by: FAMILY MEDICINE

## 2020-03-05 PROCEDURE — 2022F DILAT RTA XM EVC RTNOPTHY: CPT | Performed by: FAMILY MEDICINE

## 2020-03-05 PROCEDURE — G8427 DOCREV CUR MEDS BY ELIG CLIN: HCPCS | Performed by: FAMILY MEDICINE

## 2020-03-05 ASSESSMENT — ENCOUNTER SYMPTOMS
VOMITING: 0
CONSTIPATION: 0
DIARRHEA: 0
VOICE CHANGE: 0
SHORTNESS OF BREATH: 0
ABDOMINAL PAIN: 0
BACK PAIN: 0
SORE THROAT: 0
COUGH: 0
BLOOD IN STOOL: 0
NAUSEA: 0
ABDOMINAL DISTENTION: 0
CHEST TIGHTNESS: 0
TROUBLE SWALLOWING: 0

## 2020-03-05 NOTE — PROGRESS NOTES
Subjective:      Patient ID: Marco A Cook is a 62 y.o. female. Patient presents with:  Pre-op Exam: Eyelid Surgery on  3- by Dr. Shahida Jang and Dr. Erasmo Pineda at [facility]. Fax [phone]    She is well for basal cell ca removal right lower lid  She has no c/o    Allergies:   -- Niaspan (Niacin Er) -- Hives   -- Percocet (Oxycodone-Acetaminophen) -- Other (See Comments)    --  Dropped her blood pressure to low   -- Skelaxin (Metaxalone) -- Hives   -- Sulfamethoxazole-Trimethoprim -- Hives   -- Ciprofloxacin -- Rash   -- Omnicef -- Rash     height is 5' 7\" (1.702 m) and weight is 185 lb (83.9 kg). Her oral temperature is 97.3 °F (36.3 °C). Her blood pressure is 120/82 and her pulse is 80. No tobacco hx. Occasional ETOH. Immunization History  Administered            Date(s) Administered    Tdap (Boostrix, Adacel)                          07/30/2007  10/10/2017        Current Outpatient Medications:     metFORMIN (GLUCOPHAGE-XR) 500 MG extended release tablet, Take 1 tablet by mouth daily (with breakfast)    levothyroxine (SYNTHROID) 100 MCG tablet, TAKE 1 TABLET BY MOUTH DAILY    metoprolol tartrate (LOPRESSOR) 50 MG tablet, TAKE 1 TABLET BY MOUTH TWICE DAILY    rosuvastatin (CRESTOR) 10 MG tablet, Take 1 tablet by mouth daily    VITAMIN E BLEND PO, Take by mouth    Cholecalciferol (VITAMIN D3) 5000 units TABS, Take by mouth Daily    triamcinolone (NASACORT ALLERGY 24HR) 55 MCG/ACT nasal inhaler, 2 sprays by Nasal route daily    Past Surgical History:  11/26/2013: COLONOSCOPY      Comment:  normal dr Jeane Mccollum, hemorrhoid and diverticula  02/05/2019: COLONOSCOPY      Comment:  dr Tarik Reaves, small polyp, repeat 5 years  No date: CYSTOURETHROSCOPY/STENT REMOVAL  08/31/2012: HYSTERECTOMY      Comment:  ovaries in place.  no cancer  No date: KIDNEY SURGERY  No date: KNEE ARTHROSCOPY      Comment:  RIGHT  No date: LITHOTRIPSY  08/2013: SPINE SURGERY      Comment:  lower back microdiscectomy 11/26/2013: UPPER GASTROINTESTINAL ENDOSCOPY      Comment:  normal dr Joshua Forte. gastritis  No date: URETER STENT PLACEMENT  09/26/2017: URETEROSCOPY    No problems with anesthesia  Past Medical History:   Hyperlipidemia  Impaired fasting glucose  Migraine  Thyroid disease      Comment:  hypothyroidism          Review of Systems   Constitutional: Negative for appetite change, chills, fever and unexpected weight change. HENT: Negative for sore throat, trouble swallowing and voice change. No loose teeth. No front cap/crown   Respiratory: Negative for cough, chest tightness and shortness of breath. Cardiovascular: Negative for chest pain, palpitations and leg swelling. No hx of heart disease. Walks for exercise 2.5 miles. Swimming no cp no sob. Gastrointestinal: Negative for abdominal distention, abdominal pain, blood in stool, constipation, diarrhea, nausea and vomiting. No dysphagia no gerd    Genitourinary: Negative for difficulty urinating, dysuria, frequency, hematuria and vaginal bleeding. Musculoskeletal: Negative for back pain and neck pain. Skin: Negative for rash. Neurological: Negative for dizziness, syncope and headaches. No hx of CVA   Hematological: Negative for adenopathy. Does not bruise/bleed easily. No hx of blood clot       Objective:   Physical Exam  Constitutional:       General: She is not in acute distress. Appearance: Normal appearance. She is well-developed. She is not ill-appearing or diaphoretic. HENT:      Head: Normocephalic and atraumatic. Right Ear: Tympanic membrane and ear canal normal.      Left Ear: Tympanic membrane and ear canal normal.      Nose: Nose normal.      Mouth/Throat:      Mouth: Mucous membranes are moist. No oral lesions. Pharynx: Oropharynx is clear. Uvula midline. No posterior oropharyngeal erythema. Eyes:      General: No scleral icterus. Neck:      Musculoskeletal: Neck supple.       Thyroid: No wake up  See in office in 4 months  Continue the diet        Noelle Hager MD

## 2020-03-05 NOTE — PATIENT INSTRUCTIONS
Stop the vitamin E one week before the surgery  On the morning of the surgery take the metoprolol and thyroid medicine with just enough water to get the pills down as soon as you wake up  See in office in 4 months  Continue the diet

## 2020-03-06 LAB
ESTIMATED AVERAGE GLUCOSE: 111.2 MG/DL
HBA1C MFR BLD: 5.5 %

## 2020-04-09 RX ORDER — LEVOTHYROXINE SODIUM 0.1 MG/1
100 TABLET ORAL DAILY
Qty: 90 TABLET | Refills: 0 | Status: SHIPPED | OUTPATIENT
Start: 2020-04-09 | End: 2020-07-07

## 2020-05-18 ENCOUNTER — TELEPHONE (OUTPATIENT)
Dept: FAMILY MEDICINE CLINIC | Age: 55
End: 2020-05-18

## 2020-05-18 NOTE — TELEPHONE ENCOUNTER
From  Oswaldo Bell To  [facility] Current Practice Staff Sent  5/18/2020 12:56 PM   Hi Dr. Sangeetha Barton been having diarrhea for the past 10 days but I have no other symptoms.  My sugar today was 102 and my temperature was 97.5.  I'm thinking it's the Metformin that's doing it but not sure.  I wanted to check with you.      Thanks,   Saji Winters

## 2020-05-22 ENCOUNTER — TELEPHONE (OUTPATIENT)
Dept: FAMILY MEDICINE CLINIC | Age: 55
End: 2020-05-22

## 2020-05-22 NOTE — TELEPHONE ENCOUNTER
Her last set of numbers are really quite good  Stay off the metformin   Continue a diet alone   See me for a check up in july

## 2020-05-22 NOTE — TELEPHONE ENCOUNTER
From  Manisha Hitchcock To  [facility] Chapis Felipe Practice Staff Sent  5/22/2020 12:22 PM   Hi Dr. Aron Flores,     My diarrhea has finally stopped.  Should i stay off the Metformin and try a different medicine?  What should i do next?      Thank you,   Rogelio Romero

## 2020-05-26 ENCOUNTER — OFFICE VISIT (OUTPATIENT)
Dept: PRIMARY CARE CLINIC | Age: 55
End: 2020-05-26

## 2020-05-28 LAB
SARS-COV-2: NOT DETECTED
SOURCE: NORMAL

## 2020-06-01 ENCOUNTER — TELEPHONE (OUTPATIENT)
Dept: SURGERY | Age: 55
End: 2020-06-01

## 2020-06-01 NOTE — TELEPHONE ENCOUNTER
Confirmed with patient that covid test was negative and ok to proceed with Mohs surgery on 06/02/20. Patient was asked following questions for screening. Fever >100.0 F or >99.9 F in immunocompromised patients:  No  New onset of cough:  No  New onset shortness of breath:  No  New onset difficulty breathing:  No  New onset sore throat:  No  New onset of muscle pain:  No  New onset of severe headache:  No  New onset of loss of taste or smell:  No  New onset diarrhea/vomiting:  No      Reviewed office protocol per covid guidelines with patient.

## 2020-06-02 ENCOUNTER — PROCEDURE VISIT (OUTPATIENT)
Dept: SURGERY | Age: 55
End: 2020-06-02
Payer: COMMERCIAL

## 2020-06-02 ENCOUNTER — TELEPHONE (OUTPATIENT)
Dept: SURGERY | Age: 55
End: 2020-06-02

## 2020-06-02 VITALS — TEMPERATURE: 98.1 F

## 2020-06-02 PROBLEM — C44.1122 BASAL CELL CARCINOMA OF RIGHT LOWER EYELID: Status: ACTIVE | Noted: 2020-06-02

## 2020-06-02 PROCEDURE — 17311 MOHS 1 STAGE H/N/HF/G: CPT | Performed by: DERMATOLOGY

## 2020-06-02 NOTE — TELEPHONE ENCOUNTER
Patient was asked following questions for screening.      Fever >100.0 F or >99.9 F in immunocompromised patients:  No  New onset of cough:  No  New onset shortness of breath:  No  New onset difficulty breathing:  No  New onset sore throat:  No  New onset of muscle pain:  No  New onset of severe headache:  No  New onset of loss of taste or smell:  No  New onset diarrhea/vomiting:  No      Reviewed office protocol per covid guidelines with patient.

## 2020-06-02 NOTE — PROGRESS NOTES
MOHS PROCEDURE NOTE    PHYSICIAN:  Tanisha Crawley. Kemar De Jesus MD    ASSISTANT: Sukhdev Quevedo RN     REFERRING PROVIDER:  Dr. Angela Corona MD    PREOPERATIVE DIAGNOSIS: Nodular Basal Cell Carcinoma     SPECIFIC MOHS INDICATIONS:  location    AUC SCORIN/9    POSTOPERATIVE DIAGNOSIS: SAME    LOCATION: RLL    OPERATIVE PROCEDURE:  MOHS MICROGRAPHIC SURGERY    RECONSTRUCTION OF DEFECT: Dr. Angela Corona to perform repair as arranged    PREOPERATIVE SIZE: 3x2 MM    DEFECT SIZE: 5x3 MM    LENGTH OF REPAIRED WOUND/SIZE OF FLAP/SIZE OF GRAFT:  n/a MM    ANESTHESIA:  2mL 1% lidocaine with epinephrine 1:100,000 buffered. EBL:  MINIMAL    DURATION OF PROCEDURE:  30 MINUTES    POSTOPERATIVE OBSERVATION: 30 MINUTES    SPECIMENS:  SEE MOHS MAP    COMPLICATIONS:  NONE    DESCRIPTION OF PROCEDURE:  The patient was given a mirror, as appropriate, and the biopsy site was identified, marked with a surgical marking pen, and verified by the patient. Options for treatment were discussed and the patient was informed that Mohs surgery was the selected treatment based on its lower recurrence rate, given the features listed above, as compared to other treatment modalities such as excision, radiation, or curettage, and agreed with this treatment plan. Risks and benefits including bruising, swelling, bleeding, infection, nerve injury, recurrence, and scarring were discussed with the patient prior to the procedure and a written consent detailing these and other risks was reviewed with the patient and signed. IN addition, a video conference call with Dr. Faith Joseph, myself and the patient performed to correctly identify site. Pt had had a stye removed more laterally on the RLL and initially thought this was the site where the [facility] had been biopsied, but Dr. Faith Joseph explained to the patient that was not the case and agreed with the area I had marked at the more central RLL.     There was a time out for person and procedure

## 2020-07-07 RX ORDER — LEVOTHYROXINE SODIUM 0.1 MG/1
100 TABLET ORAL DAILY
Qty: 90 TABLET | Refills: 0 | Status: SHIPPED | OUTPATIENT
Start: 2020-07-07 | End: 2020-10-02

## 2020-08-11 ENCOUNTER — TELEPHONE (OUTPATIENT)
Dept: FAMILY MEDICINE CLINIC | Age: 55
End: 2020-08-11

## 2020-08-11 NOTE — TELEPHONE ENCOUNTER
From   Pebbles Shankar  To   [facility] [place] [facility] Practice Staff  Sent   8/11/2020 12:33 PM    Natalie Jamison [employer].      You asked that I come see you again after I had stopped taking Metformin a couple of months ago for my diabetes 2.  Do I need to take a blood test before coming to see you or to talk with you by phone or however.       Thank you,   Fernando Landrum

## 2020-08-11 NOTE — TELEPHONE ENCOUNTER
Let me order the test and she can see in office later this month or first of September  Do it fasting

## 2020-08-13 DIAGNOSIS — E05.90 HYPERTHYROIDISM: ICD-10-CM

## 2020-08-13 DIAGNOSIS — E11.9 TYPE 2 DIABETES MELLITUS WITHOUT COMPLICATION, WITHOUT LONG-TERM CURRENT USE OF INSULIN (HCC): ICD-10-CM

## 2020-08-13 LAB
A/G RATIO: 1.8 (ref 1.1–2.2)
ALBUMIN SERPL-MCNC: 4.4 G/DL (ref 3.4–5)
ALP BLD-CCNC: 172 U/L (ref 40–129)
ALT SERPL-CCNC: 51 U/L (ref 10–40)
ANION GAP SERPL CALCULATED.3IONS-SCNC: 9 MMOL/L (ref 3–16)
AST SERPL-CCNC: 36 U/L (ref 15–37)
BILIRUB SERPL-MCNC: 0.7 MG/DL (ref 0–1)
BUN BLDV-MCNC: 13 MG/DL (ref 7–20)
CALCIUM SERPL-MCNC: 9.3 MG/DL (ref 8.3–10.6)
CHLORIDE BLD-SCNC: 104 MMOL/L (ref 99–110)
CHOLESTEROL, TOTAL: 204 MG/DL (ref 0–199)
CO2: 27 MMOL/L (ref 21–32)
CREAT SERPL-MCNC: 0.7 MG/DL (ref 0.6–1.1)
ESTIMATED AVERAGE GLUCOSE: 131.2 MG/DL
GFR AFRICAN AMERICAN: >60
GFR NON-AFRICAN AMERICAN: >60
GLOBULIN: 2.5 G/DL
GLUCOSE BLD-MCNC: 119 MG/DL (ref 70–99)
HBA1C MFR BLD: 6.2 %
HDLC SERPL-MCNC: 40 MG/DL (ref 40–60)
LDL CHOLESTEROL CALCULATED: 114 MG/DL
POTASSIUM SERPL-SCNC: 4.8 MMOL/L (ref 3.5–5.1)
SODIUM BLD-SCNC: 140 MMOL/L (ref 136–145)
T4 FREE: 1.6 NG/DL (ref 0.9–1.8)
TOTAL PROTEIN: 6.9 G/DL (ref 6.4–8.2)
TRIGL SERPL-MCNC: 251 MG/DL (ref 0–150)
TSH SERPL DL<=0.05 MIU/L-ACNC: 2.04 UIU/ML (ref 0.27–4.2)
VLDLC SERPL CALC-MCNC: 50 MG/DL

## 2020-08-24 ENCOUNTER — HOSPITAL ENCOUNTER (OUTPATIENT)
Dept: ULTRASOUND IMAGING | Age: 55
Discharge: HOME | End: 2020-08-24
Payer: COMMERCIAL

## 2020-08-24 PROCEDURE — 76705 ECHO EXAM OF ABDOMEN: CPT

## 2020-08-27 DIAGNOSIS — R74.8 ELEVATED LIVER ENZYMES: ICD-10-CM

## 2020-08-27 LAB
ALBUMIN SERPL-MCNC: 4.5 G/DL (ref 3.4–5)
ALP BLD-CCNC: 185 U/L (ref 40–129)
ALT SERPL-CCNC: 55 U/L (ref 10–40)
AST SERPL-CCNC: 39 U/L (ref 15–37)
BILIRUB SERPL-MCNC: 0.6 MG/DL (ref 0–1)
BILIRUBIN DIRECT: <0.2 MG/DL (ref 0–0.3)
BILIRUBIN, INDIRECT: ABNORMAL MG/DL (ref 0–1)
GAMMA GLUTAMYL TRANSFERASE: 234 U/L (ref 5–36)
TOTAL PROTEIN: 6.9 G/DL (ref 6.4–8.2)

## 2020-09-01 LAB
ALK PHOS OTHER CALC: 0 U/L
ALK PHOSPHATASE: 197 U/L (ref 40–120)
ALKALINE PHOSPHATASE BONE FRACTION: 79 U/L (ref 0–55)
ALKALINE PHOSPHATASE LIVER FRACTION: 118 U/L (ref 0–94)

## 2020-09-02 ENCOUNTER — TELEPHONE (OUTPATIENT)
Dept: FAMILY MEDICINE CLINIC | Age: 55
End: 2020-09-02

## 2020-09-02 NOTE — TELEPHONE ENCOUNTER
----- Message from Pam Orellana MD sent at 9/2/2020 10:25 AM EDT -----  F/u appt needed.  Document call please

## 2020-09-10 ENCOUNTER — OFFICE VISIT (OUTPATIENT)
Dept: FAMILY MEDICINE CLINIC | Age: 55
End: 2020-09-10
Payer: COMMERCIAL

## 2020-09-10 ENCOUNTER — TELEPHONE (OUTPATIENT)
Dept: FAMILY MEDICINE CLINIC | Age: 55
End: 2020-09-10

## 2020-09-10 VITALS
WEIGHT: 191 LBS | HEART RATE: 72 BPM | SYSTOLIC BLOOD PRESSURE: 128 MMHG | TEMPERATURE: 97.3 F | DIASTOLIC BLOOD PRESSURE: 80 MMHG | HEIGHT: 67 IN | BODY MASS INDEX: 29.98 KG/M2

## 2020-09-10 PROCEDURE — G8427 DOCREV CUR MEDS BY ELIG CLIN: HCPCS | Performed by: FAMILY MEDICINE

## 2020-09-10 PROCEDURE — 3017F COLORECTAL CA SCREEN DOC REV: CPT | Performed by: FAMILY MEDICINE

## 2020-09-10 PROCEDURE — G8417 CALC BMI ABV UP PARAM F/U: HCPCS | Performed by: FAMILY MEDICINE

## 2020-09-10 PROCEDURE — 99213 OFFICE O/P EST LOW 20 MIN: CPT | Performed by: FAMILY MEDICINE

## 2020-09-10 PROCEDURE — 1036F TOBACCO NON-USER: CPT | Performed by: FAMILY MEDICINE

## 2020-09-10 NOTE — PROGRESS NOTES
Subjective:      Patient ID: Barry Casper is a 60 y.o. female. Patient presents with:  6 Month Follow-Up: hypertension, lipids, thyroid - discuss recent testing    She did see gi   She had pain in the abd for 20+ ruq area  She has had dm dx in 2018    She has hx of lower back pain.  Hx of herniated disc for 2 years  She has some right back pain on the thoracic ribs for a while     No cold sx no fever   No ha no   No cp no sob   Family is well  Same medications  She is adopted  Urine is passing well no pain no blood  bm ok has hx of diarrhea and will come and go no blood   No abdomen sx now no increase thirst or urine  Feet ok no numb  She did see foot doctor    YOB: 1965    Date of Visit:  9/10/2020     -- Niaspan (Niacin Er) -- Hives   -- Percocet (Oxycodone-Acetaminophen) -- Other (See Comments)    --  Dropped her blood pressure to low   -- Skelaxin (Metaxalone) -- Hives   -- Sulfamethoxazole-Trimethoprim -- Hives   -- Ciprofloxacin -- Rash   -- Omnicef -- Rash    Current Outpatient Medications:  levothyroxine (SYNTHROID) 100 MCG tablet, TAKE 1 TABLET BY MOUTH DAILY, Disp: 90 tablet, Rfl: 0  rosuvastatin (CRESTOR) 10 MG tablet, TAKE 1 TABLET BY MOUTH DAILY, Disp: 90 tablet, Rfl: 1  metoprolol tartrate (LOPRESSOR) 50 MG tablet, TAKE 1 TABLET BY MOUTH TWICE DAILY, Disp: 180 tablet, Rfl: 1  VITAMIN E BLEND PO, Take by mouth, Disp: , Rfl:   Cholecalciferol (VITAMIN D3) 5000 units TABS, Take by mouth Daily, Disp: , Rfl:   triamcinolone (NASACORT ALLERGY 24HR) 55 MCG/ACT nasal inhaler, 2 sprays by Nasal route daily, Disp: , Rfl:     No current facility-administered medications for this visit.       ---------------------------               09/10/20                      1045         ---------------------------   BP:          128/80         Site:    Left Upper Arm     Position:     Sitting        Cuff Size:   Large Adult      Pulse:         72           Temp:   97.3 °F (36.3 °C)   TempSrc:    Temporal        Weight: 191 lb (86.6 kg)    Height:  5' 7\" (1.702 m)   ---------------------------  Body mass index is 29.91 kg/m². Wt Readings from Last 3 Encounters:  09/10/20 : 191 lb (86.6 kg)  03/05/20 : 185 lb (83.9 kg)  02/12/20 : 188 lb 6.4 oz (85.5 kg)    BP Readings from Last 3 Encounters:  09/10/20 : 128/80  03/05/20 : 120/82  02/12/20 : 114/80        Review of Systems    Objective:   Physical Exam  Constitutional:       General: She is not in acute distress. Appearance: Normal appearance. She is well-developed. She is not ill-appearing or diaphoretic. Neck:      Musculoskeletal: Neck supple. Thyroid: No thyroid mass or thyromegaly. Cardiovascular:      Rate and Rhythm: Normal rate and regular rhythm. Heart sounds: Normal heart sounds. No murmur. No friction rub. No gallop. Comments:     Pulmonary:      Effort: Pulmonary effort is normal. No tachypnea, accessory muscle usage or respiratory distress. Breath sounds: Normal breath sounds. No decreased breath sounds, wheezing, rhonchi or rales. Abdominal:      General: Bowel sounds are normal. There is no distension or abdominal bruit. Palpations: Abdomen is soft. There is no hepatomegaly, splenomegaly, mass or pulsatile mass. Tenderness: There is no abdominal tenderness. There is no guarding. Lymphadenopathy:      Cervical: No cervical adenopathy. Upper Body:      Right upper body: No supraclavicular adenopathy. Left upper body: No supraclavicular adenopathy. Skin:     General: Skin is warm and dry. Coloration: Skin is not pale. Nails: There is no clubbing. Neurological:      Mental Status: She is alert. Assessment:       Diagnosis Orders   1. Essential hypertension     2. Glucose intolerance     3.  Elevated alkaline phosphatase level  Dana Rico MD, Rheumatology, [facility]     Discussed the above  She has elevated bone and gi

## 2020-09-10 NOTE — TELEPHONE ENCOUNTER
Called pt and she was trying to call Dr. Bishop Olmedo office to schedule an apt and got the run around, called Dr. Bishop Olmedo office and asked for them to call pt back to schedule an apt.

## 2020-09-15 RX ORDER — METOPROLOL TARTRATE 50 MG/1
TABLET, FILM COATED ORAL
Qty: 180 TABLET | Refills: 1 | Status: SHIPPED | OUTPATIENT
Start: 2020-09-15 | End: 2021-12-14

## 2020-10-02 RX ORDER — LEVOTHYROXINE SODIUM 0.1 MG/1
100 TABLET ORAL DAILY
Qty: 90 TABLET | Refills: 0 | Status: SHIPPED | OUTPATIENT
Start: 2020-10-02 | End: 2020-12-28

## 2020-10-08 ENCOUNTER — VIRTUAL VISIT (OUTPATIENT)
Dept: RHEUMATOLOGY | Age: 55
End: 2020-10-08
Payer: COMMERCIAL

## 2020-10-08 PROCEDURE — G8427 DOCREV CUR MEDS BY ELIG CLIN: HCPCS | Performed by: INTERNAL MEDICINE

## 2020-10-08 PROCEDURE — 99204 OFFICE O/P NEW MOD 45 MIN: CPT | Performed by: INTERNAL MEDICINE

## 2020-10-08 RX ORDER — OMEGA-3/DHA/EPA/FISH OIL 900-1400MG
CAPSULE,DELAYED RELEASE (ENTERIC COATED) ORAL
COMMUNITY

## 2020-10-08 NOTE — PROGRESS NOTES
10/8/2020   Radha Willams is a 60 y.o. female being evaluated by a Virtual Visit (video visit) encounter to address concerns as mentioned above. A caregiver was present when appropriate. Due to this being a TeleHealth encounter (During [place] [address] public health emergency), evaluation of the following organ systems was limited: Vitals/Constitutional/EENT/Resp/CV/GI//MS/Neuro/Skin/Heme-Lymph-Imm. Pursuant to the emergency declaration under the [address] [place] [address] and the [employer] Act, this Virtual Visit was conducted with patient's (and/or legal guardian's) consent, to reduce the patient's risk of exposure to COVID-19 and provide necessary medical care. The patient (and/or legal guardian) has also been advised to contact this office for worsening conditions or problems, and seek emergency medical treatment and/or call 911 if deemed necessary. Patient identification was verified at the start of the visit: Yes    Total time spent for this encounter: Not billed by time    Services were provided through a video synchronous discussion virtually to substitute for in-person clinic visit. Patient and provider were located at their individual homes. --Deri Schirmer, MD on 10/8/2020 at 2:12 PM    An electronic signature was used to authenticate this note.   Patient Name: Radha Willams  : 1965  Medical Record: <[id number]>    MEDICATIONS  Current Outpatient Medications   Medication Sig Dispense Refill    Omega-3 Fatty Acids (RA FISH OIL) 1400 MG CPDR Take by mouth      levothyroxine (SYNTHROID) 100 MCG tablet TAKE 1 TABLET BY MOUTH DAILY 90 tablet 0    metoprolol tartrate (LOPRESSOR) 50 MG tablet TAKE 1 TABLET BY MOUTH TWICE DAILY 180 tablet 1    rosuvastatin (CRESTOR) 10 MG tablet TAKE 1 TABLET BY MOUTH DAILY 90 tablet 1    VITAMIN E BLEND PO Take by mouth      Cholecalciferol (VITAMIN D3) 5000 units TABS Take by mouth Daily      triamcinolone (NASACORT ALLERGY 24HR) 55 MCG/ACT nasal inhaler 2 sprays by Nasal route daily       No current facility-administered medications for this visit. ALLERGIES  Allergies   Allergen Reactions    Niaspan [Niacin Er] Hives    Percocet [Oxycodone-Acetaminophen] Other (See Comments)     Dropped her blood pressure to low     Skelaxin [Metaxalone] Hives    Sulfamethoxazole-Trimethoprim Hives    Ciprofloxacin Rash    Omnicef Rash         Comments  No specialty comments available. Marya Nguyen MD    HISTORY OF PRESENT ILLNESS  Briseyda Shin is a 60 y.o. female with past medical history of hypertension, hypothyroidism, Graves' disease, fibromyalgia, hyperlipidemia who is being seen for follow up evaluation of  elevated alkaline phosphatase. She had incidental finding of elevated alkaline phosphatase on her blood work which led to the further work-up that showed elevated GGT, elevated AST/ALT, elevated alkaline phosphatase both of hepatic and bone origin. She had liver ultrasound that showed fatty liver. She was evaluated by GI. She had extensive work-up that showed normal AMA, F-actin, hepatitis panel, iron studies, slightly elevated ferritin of 197, normal tissue transglutaminase IgA. she was referred by PCP to rule out bone origin of alkaline phosphatase/Paget's disease. She is adopted. Her family history is unknown. She denies any bone pain or fractures. She has chronic muscle pain due to fibromyalgia. She denies fever, swollen glands. She is complaining of intermittent pain in the knuckles. She denies any swelling. She has morning stiffness lasting for up to half an hour  . HPI  Review of Systems    REVIEW OF BONE SYSTEMS: Positive for fatigue, dry eyes, history of 3 miscarriages  Constitutional: No unanticipated weight loss or fevers.    Integumentary: No rash, photosensitivity, malar rash, livedo reticularis, alopecia and Raynaud's symptoms, sclerodactyly, skin tightening  Eyes: negative for visual disturbance and persistent redness, discharge from eyes   ENT: - No tinnitus, loss of hearing, vertigo, or recurrent ear infections.  - No history of nasal/oral ulcers. - No history of dry mouth  Cardiovascular: No history of pericarditis, chest pain or murmur or palpitations  Respiratory: No shortness of breath, cough or history of interstitial lung disease. No history of pleurisy. No history of tuberculosis or atypical infections. Gastrointestinal: No history of heart burn, dysphagia or esophageal dysmotility. No change in bowel habits or any inflammatory bowel disease. Genitourinary: No history renal disease  Hematologic/Lymphatic: No abnormal bruising or bleeding, blood clots or swollen lymph nodes. Musculoskeletal: Denies having any swollen joints, joint pains or stiffness   Neurological: No history of headaches, seizure or focal weakness. No history of neuropathies, paresthesias or hyperesthesias, facial droop, diplopia  Psychiatric: No history of bipolar disease, anxiety, depression  Endocrine: Denies any polyuria, polydipsia and osteoporosis  Allergic/Immunologic: No nasal congestion or hives. I have reviewed patients Past medical History, Social History and Family History as mentioned in her chart and this remains unchanged fromprevious. Past Medical History:   Diagnosis Date    Abdominal pain, other specified site     AR (allergic rhinitis)     Diarrhea     Hyperlipidemia     Impaired fasting glucose     Migraine     Rectal bleeding     Reflux gastritis     Thyroid disease     hypothyroidism     Past Surgical History:   Procedure Laterality Date    COLONOSCOPY  11/26/2013    normal dr Carter Toribio, hemorrhoid and diverticula    COLONOSCOPY  02/05/2019    dr Adrianna Royal, small polyp, repeat 5 years    CYSTOURETHROSCOPY/STENT REMOVAL      HYSTERECTOMY  08/31/2012    ovaries in place.  no cancer    KIDNEY SURGERY  KNEE ARTHROSCOPY      RIGHT    LITHOTRIPSY      MOHS SURGERY  06/02/2020    SPINE SURGERY  08/2013    lower back microdiscectomy     UPPER GASTROINTESTINAL ENDOSCOPY  11/26/2013    normal dr Tomer Brewer. gastritis    URETER STENT PLACEMENT      URETEROSCOPY  09/26/2017     Social History     Socioeconomic History    Marital status:      Spouse name: Not on file    Number of children: Not on file    Years of education: Not on file    Highest education level: Not on file   Occupational History    Occupation: Accompanist part-time   Social Needs    Financial resource strain: Not on file    Food insecurity     Worry: Not on file     Inability: Not on file   [employer] needs     Medical: Not on file     Non-medical: Not on file   Tobacco Use    Smoking status: Never Smoker    Smokeless tobacco: Never Used   Substance and Sexual Activity    Alcohol use:  Yes     Alcohol/week: 1.0 standard drinks     Types: 1 Glasses of wine per week     Comment: once a month    Drug use: No    Sexual activity: Not on file   Lifestyle    Physical activity     Days per week: Not on file     Minutes per session: Not on file    Stress: Not on file   Relationships    Social connections     Talks on phone: Not on file     Gets together: Not on file     Attends Sikh service: Not on file     Active member of club or organization: Not on file     Attends meetings of clubs or organizations: Not on file     Relationship status: Not on file    Intimate partner violence     Fear of current or ex partner: Not on file     Emotionally abused: Not on file     Physically abused: Not on file     Forced sexual activity: Not on file   Other Topics Concern    Not on file   Social History Narrative    Not on file     Family History   Adopted: Yes   Problem Relation Age of Onset    Diabetes Mother     Heart Disease Father     Cancer Brother         colon       PHYSICAL EXAMINATION:  [ INSTRUCTIONS:  \"[x]\" Indicates a positive item  \"[]\" Indicates a negative item  -- DELETE ALL ITEMS NOT EXAMINED]  Vital Signs: (As obtained by patient/caregiver or practitioner observation)    Blood pressure-  Heart rate-    Respiratory rate-    Temperature-  Pulse oximetry-     Constitutional: [x] Appears well-developed and well-nourished [x] No apparent distress      [] Abnormal-   Mental status  [x] Alert and awake  [x] Oriented to person/place/time [x]Able to follow commands      Eyes:  EOM    [x]  Normal  [] Abnormal-  Sclera  [x]  Normal  [] Abnormal -         Discharge []  None visible  [] Abnormal -    HENT:   [x] Normocephalic, atraumatic.   [] Abnormal   [x] Mouth/Throat: Mucous membranes are moist.     External Ears [x] Normal  [] Abnormal-     Neck: [x] No visualized mass     Pulmonary/Chest: [x] Respiratory effort normal.  [x] No visualized signs of difficulty breathing or respiratory distress        [] Abnormal-      Musculoskeletal:   [x] Normal gait with no signs of ataxia         [x] Normal range of motion of neck        [] Abnormal-       Neurological:        [x] No Facial Asymmetry (Cranial nerve 7 motor function) (limited exam to video visit)          [x] No gaze palsy        [] Abnormal-         Skin:        [x] No significant exanthematous lesions or discoloration noted on facial skin         [] Abnormal-            Psychiatric:       [x] Normal Affect [x] No Hallucinations        [] Abnormal-         LABS AND IMAGING  Outside data reviewed and in HPI    Lab Results   Component Value Date    WBC 9.3 06/15/2016    RBC 4.60 06/15/2016    HGB 15.1 06/15/2016    HCT 44.1 06/15/2016     06/15/2016    MCV 95.8 06/15/2016    MCH 32.9 06/15/2016    MCHC 34.4 06/15/2016    RDW 13.4 06/15/2016    SEGSPCT 68.8 10/01/2010    LYMPHOPCT 33.4 06/15/2016    MONOPCT 6.2 06/15/2016    EOSPCT 2.2 10/01/2010    BASOPCT 1.5 06/15/2016    MONOSABS 0.6 06/15/2016    LYMPHSABS 3.1 06/15/2016    EOSABS 0.3 06/15/2016    BASOSABS 0.1 06/15/2016 DIFFTYPE Auto-K 10/01/2010       Chemistry        Component Value Date/Time     08/13/2020 0915    K 4.8 08/13/2020 0915     08/13/2020 0915    CO2 27 08/13/2020 0915    BUN 13 08/13/2020 0915    CREATININE 0.7 08/13/2020 0915        Component Value Date/Time    CALCIUM 9.3 08/13/2020 0915    ALKPHOS 197 (H) 08/27/2020 1118    ALKPHOS 185 (H) 08/27/2020 1118    AST 39 (H) 08/27/2020 1118    ALT 55 (H) 08/27/2020 1118    BILITOT 0.6 08/27/2020 1118          Lab Results   Component Value Date    SEDRATE 37 (H) 04/10/2013     No results found for: CRP  Lab Results   Component Value Date    MARIXA Negative 11/14/2014     No results found for: RF, CCPABIGG  Lab Results   Component Value Date    MARIXA Negative 11/14/2014    ANAINT see below 11/14/2014     No results found for: DSDNAG, DSDNAIGGIFA  No results found for: SSAROAB, SSALAAB  No results found for: SMAB, RNPAB  No results found for: CENTABIGG  No results found for: C3, C4, ACE  Lab Results   Component Value Date    VITD25 53.2 03/05/2018     No results found for: Derenda Bilberry  No results found for: Aurelio Sosa  Lab Results   Component Value Date    TSH 2.04 08/13/2020     Lab Results   Component Value Date    VITD25 53.2 03/05/2018       Radiology: Liver ultrasound 8/24/2020     FINDINGS:    LIVER:  The liver demonstrates increased echogenicity without evidence of    intrahepatic biliary ductal dilatation.         BILIARY SYSTEM:  Gallbladder is unremarkable without evidence of    pericholecystic fluid, wall thickening or stones.  Negative sonographic    Bangura's sign.         Common bile duct is within normal limits measuring 5 mm.         RIGHT KIDNEY: The right kidney is grossly unremarkable without evidence of    hydronephrosis.         PANCREAS:  Visualized portions of the pancreas are unremarkable.         OTHER: No evidence of right upper quadrant ascites.              Impression    Fatty infiltration of the liver.               ASSESSMENT AND PLAN      Assessment/Plan:      ASSESSMENT:    1. Elevated alkaline phosphatase level        PLAN:     Mehdi Ledezma was seen today for establish care. Diagnoses and all orders for this visit:    Elevated alkaline phosphatase level  -     CBC Auto Differential; Future  -     Comprehensive Metabolic Panel; Future  -     C-Reactive Protein; Future  -     Sedimentation Rate; Future  -     Hepatitis B Core Antibody, IgM; Future  -     Hepatitis B Surface Antigen; Future  -     Hepatitis C Antibody; Future  -     TSH WITH REFLEX TO FT4; Future  -     Vitamin D 25 Hydroxy; Future  -     NM BONE SCAN 3 PHASE; Future  -     PTH, Intact; Future    Elevated alkaline phosphatase from both hepatic and bone origin, elevated GGT, AST/ALT. Evaluated by GI and had extensive work-up including MARIXA, F-actin, iron studies, tissue transglutaminase IgA, hepatitis panel that came back negative. Liver ultrasound showed fatty liver. No history of fractures or bone pain. We will do additional work-up to rule out Paget's/hyperparathyroidism/vitamin D deficiency  Further management pending test results    The patient indicates understanding of these issues and agrees with the plan. Return in about 6 weeks (around 11/19/2020). The risks and benefits of my recommendations, as well as other treatment options, benefits and side effects werediscussed with the patient. All questions were answered. I reviewed patient's history, referral documents and electronic medical records  Copy of consult note is being routedelectronically/faxed to referring physician         ######################################################################    I thank you for giving me theopportunity to participate in [facility]. If you have any questions or concerns please feel free to contact me. I look forward to following  Mehdi Ledezma along with you.       Electronically signed by: Zachery Blanca MD, 10/8/2020 2:12 PM    Documentation was done using voice recognition dragon software. Every effort was made to ensure accuracy;however, inadvertent unintentional computerized transcription errors may be present.

## 2020-10-08 NOTE — LETTER
[address] [place] [address] [id number]  Phone: [phone]  Fax: [phone]    Paulo Pacheco MD        October 8, 2020     Jazmín Wright MD  [address], [address] [place] [id number]    Patient: Ginette Alexander  MR Number: <[id number]>  YOB: 1965  Date of Visit: 10/8/2020    Dear Dr. Jazmín Wright:    Thank you for your referral. Progress note attached in visit summary. If you have questions, please do not hesitate to call me. I look forward to following Litzy Hernandez along with you.     Sincerely,        Paulo Pacheco MD

## 2020-10-12 LAB
A/G RATIO: 1.9 (ref 1.1–2.2)
ALBUMIN SERPL-MCNC: 4.6 G/DL (ref 3.4–5)
ALP BLD-CCNC: 187 U/L (ref 40–129)
ALT SERPL-CCNC: 30 U/L (ref 10–40)
ANION GAP SERPL CALCULATED.3IONS-SCNC: 19 MMOL/L (ref 3–16)
AST SERPL-CCNC: 27 U/L (ref 15–37)
BASOPHILS ABSOLUTE: 0.1 K/UL (ref 0–0.2)
BASOPHILS RELATIVE PERCENT: 1.7 %
BILIRUB SERPL-MCNC: 0.7 MG/DL (ref 0–1)
BUN BLDV-MCNC: 18 MG/DL (ref 7–20)
CALCIUM SERPL-MCNC: 10.4 MG/DL (ref 8.3–10.6)
CHLORIDE BLD-SCNC: 106 MMOL/L (ref 99–110)
CO2: 21 MMOL/L (ref 21–32)
CREAT SERPL-MCNC: 0.9 MG/DL (ref 0.6–1.1)
EOSINOPHILS ABSOLUTE: 0.2 K/UL (ref 0–0.6)
EOSINOPHILS RELATIVE PERCENT: 2.7 %
GFR AFRICAN AMERICAN: >60
GFR NON-AFRICAN AMERICAN: >60
GLOBULIN: 2.4 G/DL
GLUCOSE BLD-MCNC: 196 MG/DL (ref 70–99)
HCT VFR BLD CALC: 44.6 % (ref 36–48)
HEMOGLOBIN: 15.2 G/DL (ref 12–16)
HEPATITIS B CORE IGM ANTIBODY: NORMAL
HEPATITIS B SURFACE ANTIGEN INTERPRETATION: NORMAL
HEPATITIS C ANTIBODY INTERPRETATION: NORMAL
LYMPHOCYTES ABSOLUTE: 2.5 K/UL (ref 1–5.1)
LYMPHOCYTES RELATIVE PERCENT: 32.8 %
MCH RBC QN AUTO: 32.2 PG (ref 26–34)
MCHC RBC AUTO-ENTMCNC: 34.1 G/DL (ref 31–36)
MCV RBC AUTO: 94.3 FL (ref 80–100)
MONOCYTES ABSOLUTE: 0.4 K/UL (ref 0–1.3)
MONOCYTES RELATIVE PERCENT: 4.8 %
NEUTROPHILS ABSOLUTE: 4.4 K/UL (ref 1.7–7.7)
NEUTROPHILS RELATIVE PERCENT: 58 %
PARATHYROID HORMONE INTACT: 36.6 PG/ML (ref 14–72)
PDW BLD-RTO: 12.3 % (ref 12.4–15.4)
PLATELET # BLD: 279 K/UL (ref 135–450)
PMV BLD AUTO: 9.1 FL (ref 5–10.5)
POTASSIUM SERPL-SCNC: 4.6 MMOL/L (ref 3.5–5.1)
RBC # BLD: 4.73 M/UL (ref 4–5.2)
SEDIMENTATION RATE, ERYTHROCYTE: 4 MM/HR (ref 0–30)
SODIUM BLD-SCNC: 146 MMOL/L (ref 136–145)
TOTAL PROTEIN: 7 G/DL (ref 6.4–8.2)
TSH REFLEX FT4: 1 UIU/ML (ref 0.27–4.2)
VITAMIN D 25-HYDROXY: 63.1 NG/ML
WBC # BLD: 7.5 K/UL (ref 4–11)

## 2020-10-13 LAB — C-REACTIVE PROTEIN: 1.7 MG/L (ref 0–5.1)

## 2020-10-15 ENCOUNTER — HOSPITAL ENCOUNTER (OUTPATIENT)
Dept: NUCLEAR MEDICINE | Age: 55
Discharge: HOME | End: 2020-10-15
Payer: COMMERCIAL

## 2020-10-15 PROCEDURE — A9503 TC99M MEDRONATE: HCPCS | Performed by: INTERNAL MEDICINE

## 2020-10-15 PROCEDURE — 3430000000 HC RX DIAGNOSTIC RADIOPHARMACEUTICAL: Performed by: INTERNAL MEDICINE

## 2020-10-15 PROCEDURE — 78306 BONE IMAGING WHOLE BODY: CPT

## 2020-10-15 RX ORDER — TC 99M MEDRONATE 20 MG/10ML
25 INJECTION, POWDER, LYOPHILIZED, FOR SOLUTION INTRAVENOUS
Status: COMPLETED | OUTPATIENT
Start: 2020-10-15 | End: 2020-10-15

## 2020-10-15 RX ADMIN — TC 99M MEDRONATE 25 MILLICURIE: 20 INJECTION, POWDER, LYOPHILIZED, FOR SOLUTION INTRAVENOUS at 09:54

## 2020-10-20 RX ORDER — ROSUVASTATIN CALCIUM 10 MG/1
10 TABLET, COATED ORAL DAILY
Qty: 90 TABLET | Refills: 1 | Status: SHIPPED | OUTPATIENT
Start: 2020-10-20 | End: 2021-05-20

## 2020-11-12 ENCOUNTER — HOSPITAL ENCOUNTER (OUTPATIENT)
Dept: NUCLEAR MEDICINE | Age: 55
Discharge: HOME | End: 2020-11-12
Payer: COMMERCIAL

## 2020-11-12 PROCEDURE — A9562 TC99M MERTIATIDE: HCPCS | Performed by: INTERNAL MEDICINE

## 2020-11-12 PROCEDURE — 3430000000 HC RX DIAGNOSTIC RADIOPHARMACEUTICAL: Performed by: INTERNAL MEDICINE

## 2020-11-12 PROCEDURE — 78707 K FLOW/FUNCT IMAGE W/O DRUG: CPT

## 2020-11-12 RX ADMIN — TECHNESCAN TC 99M MERTIATIDE 10 MILLICURIE: 1 INJECTION, POWDER, LYOPHILIZED, FOR SOLUTION INTRAVENOUS at 10:30

## 2020-11-19 ENCOUNTER — VIRTUAL VISIT (OUTPATIENT)
Dept: RHEUMATOLOGY | Age: 55
End: 2020-11-19
Payer: COMMERCIAL

## 2020-11-19 PROCEDURE — 1036F TOBACCO NON-USER: CPT | Performed by: INTERNAL MEDICINE

## 2020-11-19 PROCEDURE — G8484 FLU IMMUNIZE NO ADMIN: HCPCS | Performed by: INTERNAL MEDICINE

## 2020-11-19 PROCEDURE — 3017F COLORECTAL CA SCREEN DOC REV: CPT | Performed by: INTERNAL MEDICINE

## 2020-11-19 PROCEDURE — G8417 CALC BMI ABV UP PARAM F/U: HCPCS | Performed by: INTERNAL MEDICINE

## 2020-11-19 PROCEDURE — 99213 OFFICE O/P EST LOW 20 MIN: CPT | Performed by: INTERNAL MEDICINE

## 2020-11-19 PROCEDURE — G8427 DOCREV CUR MEDS BY ELIG CLIN: HCPCS | Performed by: INTERNAL MEDICINE

## 2020-11-19 NOTE — PROGRESS NOTES
2020   Briseyda Shin is a 60 y.o. female being evaluated by a Virtual Visit (video visit) encounter to address concerns as mentioned above. A caregiver was present when appropriate. Due to this being a TeleHealth encounter (During [facility] public health emergency), evaluation of the following organ systems was limited: Vitals/Constitutional/EENT/Resp/CV/GI//MS/Neuro/Skin/Heme-Lymph-Imm. Pursuant to the emergency declaration under the [address] [place] [address], [address] [place] [address] and the [employer] and [employer] Act, this Virtual Visit was conducted with patient's (and/or legal guardian's) consent, to reduce the patient's risk of exposure to COVID-19 and provide necessary medical care. The patient (and/or legal guardian) has also been advised to contact this office for worsening conditions or problems, and seek emergency medical treatment and/or call 911 if deemed necessary. Patient identification was verified at the start of the visit: Yes    Total time spent for this encounter: Not billed by time    Services were provided through a video synchronous discussion virtually to substitute for in-person clinic visit. Patient and provider were located at their individual homes. --Brian Lerner MD on 2020 at 12:28 PM    An electronic signature was used to authenticate this note.   Patient Name: Briseyda Shin  : 1965  Medical Record: <[id number]>    MEDICATIONS  Current Outpatient Medications   Medication Sig Dispense Refill    rosuvastatin (CRESTOR) 10 MG tablet TAKE 1 TABLET BY MOUTH DAILY 90 tablet 1    Omega-3 Fatty Acids (RA FISH OIL) 1400 MG CPDR Take by mouth      levothyroxine (SYNTHROID) 100 MCG tablet TAKE 1 TABLET BY MOUTH DAILY 90 tablet 0    metoprolol tartrate (LOPRESSOR) 50 MG tablet TAKE 1 TABLET BY MOUTH TWICE DAILY 180 tablet 1    VITAMIN E BLEND PO Take by mouth      Cholecalciferol (VITAMIN D3) 5000 units TABS Take by mouth Daily      triamcinolone (NASACORT ALLERGY 24HR) 55 MCG/ACT nasal inhaler 2 sprays by Nasal route daily       No current facility-administered medications for this visit. ALLERGIES  Allergies   Allergen Reactions    Niaspan [Niacin Er] Hives    Percocet [Oxycodone-Acetaminophen] Other (See Comments)     Dropped her blood pressure to low     Skelaxin [Metaxalone] Hives    Sulfamethoxazole-Trimethoprim Hives    Ciprofloxacin Rash    Omnicef Rash         Comments  No specialty comments available. Background history:  Trent Macias is a 60 y.o. female with past medical history of hypertension, hypothyroidism, Graves' disease, fibromyalgia, hyperlipidemia who is being seen for follow up evaluation of  elevated alkaline phosphatase. She had incidental finding of elevated alkaline phosphatase on her blood work which led to the further work-up that showed elevated GGT, elevated AST/ALT, elevated alkaline phosphatase both of hepatic and bone origin. She had liver ultrasound that showed fatty liver. She was evaluated by GI. She had extensive work-up that showed normal AMA, F-actin, hepatitis panel, iron studies, slightly elevated ferritin of 197, normal tissue transglutaminase IgA. she was referred by PCP to rule out bone origin of alkaline phosphatase/Paget's disease. She is adopted. Her family history is unknown. She denies any bone pain or fractures. She has chronic muscle pain due to fibromyalgia. She denies fever, swollen glands. She is complaining of intermittent pain in the knuckles. She denies any swelling. She has morning stiffness lasting for up to half an hour    Interim history: She presents for follow-up of elevated alkaline phosphatase. She was found to have elevated GGT, AST/AST and alkaline phosphatase both of hepatic and bone origin. She had liver ultrasound that showed fatty liver. She was evaluated by GI. She had extensive work-up that showed normal AMA, F-actin, hepatitis panel, iron studies, slightly elevated ferritin of 197, normal tissue transglutaminase IgA. PTH, vitamin D, TSH came back normal.  Bone scan no evidence of Paget's disease. She has chronic muscle pain due to fibromyalgia. Anna Gamble HPI  Review of Systems    REVIEW OF BONE SYSTEMS: Positive for fatigue, dry eyes, history of 3 miscarriages  Constitutional: No unanticipated weight loss or fevers. Integumentary: No rash, photosensitivity, malar rash, livedo reticularis, alopecia and Raynaud's symptoms, sclerodactyly, skin tightening  Eyes: negative for visual disturbance and persistent redness, discharge from eyes   ENT: - No tinnitus, loss of hearing, vertigo, or recurrent ear infections.  - No history of nasal/oral ulcers. - No history of dry mouth  Cardiovascular: No history of pericarditis, chest pain or murmur or palpitations  Respiratory: No shortness of breath, cough or history of interstitial lung disease. No history of pleurisy. No history of tuberculosis or atypical infections. Gastrointestinal: No history of heart burn, dysphagia or esophageal dysmotility. No change in bowel habits or any inflammatory bowel disease. Genitourinary: No history renal disease  Hematologic/Lymphatic: No abnormal bruising or bleeding, blood clots or swollen lymph nodes. Musculoskeletal: Denies having any swollen joints, joint pains or stiffness   Neurological: No history of headaches, seizure or focal weakness. No history of neuropathies, paresthesias or hyperesthesias, facial droop, diplopia  Psychiatric: No history of bipolar disease, anxiety, depression  Endocrine: Denies any polyuria, polydipsia and osteoporosis  Allergic/Immunologic: No nasal congestion or hives. I have reviewed patients Past medical History, Social History and Family History as mentioned in her chart and this remains unchanged fromprevious.     Past Medical History:   Diagnosis Date    Abdominal pain, other specified site  AR (allergic rhinitis)     Diarrhea     Hyperlipidemia     Impaired fasting glucose     Migraine     Rectal bleeding     Reflux gastritis     Thyroid disease     hypothyroidism     Past Surgical History:   Procedure Laterality Date    COLONOSCOPY  11/26/2013    normal dr Tammy Corona, hemorrhoid and diverticula    COLONOSCOPY  02/05/2019    dr Tarango Never, small polyp, repeat 5 years    CYSTOURETHROSCOPY/STENT REMOVAL      HYSTERECTOMY  08/31/2012    ovaries in place. no cancer    KIDNEY SURGERY      KNEE ARTHROSCOPY      RIGHT    LITHOTRIPSY      MOHS SURGERY  06/02/2020    SPINE SURGERY  08/2013    lower back microdiscectomy     UPPER GASTROINTESTINAL ENDOSCOPY  11/26/2013    normal dr Tammy Corona. gastritis    URETER STENT PLACEMENT      URETEROSCOPY  09/26/2017     Social History     Socioeconomic History    Marital status:      Spouse name: Not on file    Number of children: Not on file    Years of education: Not on file    Highest education level: Not on file   Occupational History    Occupation: Accompanist part-time   Social Needs    Financial resource strain: Not on file    Food insecurity     Worry: Not on file     Inability: Not on file   [employer] needs     Medical: Not on file     Non-medical: Not on file   Tobacco Use    Smoking status: Never Smoker    Smokeless tobacco: Never Used   Substance and Sexual Activity    Alcohol use:  Yes     Alcohol/week: 1.0 standard drinks     Types: 1 Glasses of wine per week     Comment: once a month    Drug use: No    Sexual activity: Not on file   Lifestyle    Physical activity     Days per week: Not on file     Minutes per session: Not on file    Stress: Not on file   Relationships    Social connections     Talks on phone: Not on file     Gets together: Not on file     Attends [employer] service: Not on file     Active member of club or organization: Not on file     Attends meetings of clubs or organizations: Not on file Relationship status: Not on file    Intimate partner violence     Fear of current or ex partner: Not on file     Emotionally abused: Not on file     Physically abused: Not on file     Forced sexual activity: Not on file   Other Topics Concern    Not on file   Social History Narrative    Not on file     Family History   Adopted: Yes   Problem Relation Age of Onset    Diabetes Mother     Heart Disease Father     Cancer Brother         colon       PHYSICAL EXAMINATION:  [ Annalisa Rook:  \"[x]\" Indicates a positive item  \"[]\" Indicates a negative item  -- DELETE ALL ITEMS NOT EXAMINED]  Vital Signs: (As obtained by patient/caregiver or practitioner observation)    Blood pressure-  Heart rate-    Respiratory rate-    Temperature-  Pulse oximetry-     Constitutional: [x] Appears well-developed and well-nourished [x] No apparent distress      [] Abnormal-   Mental status  [x] Alert and awake  [x] Oriented to person/place/time [x]Able to follow commands      Eyes:  EOM    [x]  Normal  [] Abnormal-  Sclera  [x]  Normal  [] Abnormal -         Discharge []  None visible  [] Abnormal -    HENT:   [x] Normocephalic, atraumatic.   [] Abnormal   [x] Mouth/Throat: Mucous membranes are moist.     External Ears [x] Normal  [] Abnormal-     Neck: [x] No visualized mass     Pulmonary/Chest: [x] Respiratory effort normal.  [x] No visualized signs of difficulty breathing or respiratory distress        [] Abnormal-      Musculoskeletal:   [x] Normal gait with no signs of ataxia         [x] Normal range of motion of neck        [] Abnormal-       Neurological:        [x] No Facial Asymmetry (Cranial nerve 7 motor function) (limited exam to video visit)          [x] No gaze palsy        [] Abnormal-         Skin:        [x] No significant exanthematous lesions or discoloration noted on facial skin         [] Abnormal-            Psychiatric:       [x] Normal Affect [x] No Hallucinations        [] Abnormal-         LABS AND IMAGING  Outside data reviewed and in HPI    Lab Results   Component Value Date    WBC 9.1 11/11/2020    RBC 4.67 11/11/2020    HGB 15.2 11/11/2020    HCT 43.9 11/11/2020     11/11/2020    MCV 94.2 11/11/2020    MCH 32.5 11/11/2020    MCHC 34.5 11/11/2020    RDW 12.7 11/11/2020    SEGSPCT 68.8 10/01/2010    LYMPHOPCT 29.6 11/11/2020    MONOPCT 5.5 11/11/2020    EOSPCT 2.2 10/01/2010    BASOPCT 1.1 11/11/2020    MONOSABS 0.5 11/11/2020    LYMPHSABS 2.7 11/11/2020    EOSABS 0.2 11/11/2020    BASOSABS 0.1 11/11/2020    DIFFTYPE Auto-K 10/01/2010       Chemistry        Component Value Date/Time     11/11/2020 0904    K 4.3 11/11/2020 0904     11/11/2020 0904    CO2 26 11/11/2020 0904    BUN 18 11/11/2020 0904    CREATININE 0.8 11/11/2020 0904        Component Value Date/Time    CALCIUM 10.2 11/11/2020 0904    ALKPHOS 187 (H) 10/12/2020 0922    AST 27 10/12/2020 0922    ALT 30 10/12/2020 0922    BILITOT 0.7 10/12/2020 0922          Lab Results   Component Value Date    SEDRATE 4 10/12/2020     Lab Results   Component Value Date    CRP 1.7 10/12/2020     Lab Results   Component Value Date    MARIXA Negative 11/14/2014     No results found for: RF, CCPABIGG  Lab Results   Component Value Date    MARIXA Negative 11/14/2014    ANAINT see below 11/14/2014     No results found for: DSDNAG, DSDNAIGGIFA  No results found for: SSAROAB, SSALAAB  No results found for: SMAB, RNPAB  No results found for: CENTABIGG  No results found for: C3, C4, ACE  Lab Results   Component Value Date    VITD25 63.1 10/12/2020     No results found for: Monica Cadet  No results found for: Paulene Leak  Lab Results   Component Value Date    TSHFT4 1.00 10/12/2020    TSH 2.04 08/13/2020     Lab Results   Component Value Date    VITD25 63.1 10/12/2020       Radiology: Liver ultrasound 8/24/2020     FINDINGS:    LIVER:  The liver demonstrates increased echogenicity without evidence of    intrahepatic biliary ductal dilatation.

## 2020-11-23 ENCOUNTER — TELEPHONE (OUTPATIENT)
Dept: FAMILY MEDICINE CLINIC | Age: 55
End: 2020-11-23

## 2020-11-23 NOTE — TELEPHONE ENCOUNTER
Pt has a sinus infection and needs a VV, nothing is available. Stuffy nose   Headache   Sore throat   NO fever     Pl advise.    [phone]

## 2020-11-24 ENCOUNTER — VIRTUAL VISIT (OUTPATIENT)
Dept: FAMILY MEDICINE CLINIC | Age: 55
End: 2020-11-24
Payer: COMMERCIAL

## 2020-11-24 PROCEDURE — G8427 DOCREV CUR MEDS BY ELIG CLIN: HCPCS | Performed by: FAMILY MEDICINE

## 2020-11-24 PROCEDURE — 3017F COLORECTAL CA SCREEN DOC REV: CPT | Performed by: FAMILY MEDICINE

## 2020-11-24 PROCEDURE — 99213 OFFICE O/P EST LOW 20 MIN: CPT | Performed by: FAMILY MEDICINE

## 2020-11-24 RX ORDER — AMOXICILLIN AND CLAVULANATE POTASSIUM 875; 125 MG/1; MG/1
1 TABLET, FILM COATED ORAL 2 TIMES DAILY
Qty: 20 TABLET | Refills: 0 | Status: SHIPPED | OUTPATIENT
Start: 2020-11-24 | End: 2020-12-04

## 2020-11-24 NOTE — PROGRESS NOTES
appears well at home and no distress. No resp symptoms no cough    Neurological:      Mental Status: She is alert. Assessment:        Diagnosis Orders   1. Acute non-recurrent sinusitis, unspecified location       Orders Placed This Encounter   Medications    amoxicillin-clavulanate (AUGMENTIN) 875-125 MG per tablet     Sig: Take 1 tablet by mouth 2 times daily for 10 days     Dispense:  20 tablet     Refill:  0       She indicates that amoxicillin is fine and no pb with in past  [employer] is  being evaluated by a Virtual Visit (video visit) encounter to address concerns as mentioned above. A caregiver was present when appropriate. Due to this being a TeleHealth encounter (During [facility]- public health emergency), evaluation of the following organ systems was limited: Vitals/Constitutional/EENT/Resp/CV/GI//MS/Neuro/Skin/Heme-Lymph-Imm. Pursuant to the emergency declaration under the [address] [place] [address], [address] [place] [address] and the [employer] and [employer] Act, this Virtual Visit was conducted with patient's (and/or legal guardian's) consent, to reduce the patient's risk of exposure to COVID-19 and provide necessary medical care. The patient (and/or legal guardian) has also been advised to contact this office for worsening conditions or problems, and seek emergency medical treatment and/or call 911 if deemed necessary. Services were provided through a video synchronous discussion virtually to substitute for in-person clinic visit. Patient and provider were located at their individual homes.            Plan:      Do take the medicine  Call or go to ER if worse especially if any breathing problems         Basil Wright MD

## 2021-02-25 ENCOUNTER — TELEPHONE (OUTPATIENT)
Dept: RHEUMATOLOGY | Age: 56
End: 2021-02-25

## 2021-02-25 NOTE — TELEPHONE ENCOUNTER
CALLED PATIENT LEFT MESSAGE TO CALL BACK ON WHAT LAB ORDERS THAT WAS NOT COVERED. WE USUALLY GET A FAX FORM ASKING FOR ANOTHER DX CODE.

## 2021-03-05 ENCOUNTER — TELEPHONE (OUTPATIENT)
Dept: SURGERY | Age: 56
End: 2021-03-05

## 2021-05-05 ENCOUNTER — HOSPITAL ENCOUNTER (OUTPATIENT)
Age: 56
Discharge: HOME | End: 2021-05-05
Payer: COMMERCIAL

## 2021-05-05 ENCOUNTER — HOSPITAL ENCOUNTER (OUTPATIENT)
Dept: GENERAL RADIOLOGY | Age: 56
Discharge: HOME | End: 2021-05-05
Payer: COMMERCIAL

## 2021-05-05 DIAGNOSIS — N20.1 CALCULUS OF URETER: ICD-10-CM

## 2021-05-05 PROCEDURE — 74018 RADEX ABDOMEN 1 VIEW: CPT

## 2021-05-20 RX ORDER — ROSUVASTATIN CALCIUM 10 MG/1
10 TABLET, COATED ORAL DAILY
Qty: 90 TABLET | Refills: 1 | Status: SHIPPED | OUTPATIENT
Start: 2021-05-20 | End: 2021-11-03

## 2021-06-28 RX ORDER — LEVOTHYROXINE SODIUM 0.1 MG/1
100 TABLET ORAL DAILY
Qty: 90 TABLET | Refills: 1 | Status: SHIPPED | OUTPATIENT
Start: 2021-06-28 | End: 2021-12-20

## 2021-07-08 ENCOUNTER — OFFICE VISIT (OUTPATIENT)
Dept: FAMILY MEDICINE CLINIC | Age: 56
End: 2021-07-08
Payer: COMMERCIAL

## 2021-07-08 VITALS
SYSTOLIC BLOOD PRESSURE: 128 MMHG | DIASTOLIC BLOOD PRESSURE: 82 MMHG | TEMPERATURE: 98.4 F | HEIGHT: 67 IN | BODY MASS INDEX: 30.13 KG/M2 | WEIGHT: 192 LBS

## 2021-07-08 DIAGNOSIS — J06.9 ACUTE UPPER RESPIRATORY INFECTION, UNSPECIFIED: Primary | ICD-10-CM

## 2021-07-08 PROCEDURE — G8417 CALC BMI ABV UP PARAM F/U: HCPCS | Performed by: FAMILY MEDICINE

## 2021-07-08 PROCEDURE — G8427 DOCREV CUR MEDS BY ELIG CLIN: HCPCS | Performed by: FAMILY MEDICINE

## 2021-07-08 PROCEDURE — 1036F TOBACCO NON-USER: CPT | Performed by: FAMILY MEDICINE

## 2021-07-08 PROCEDURE — 3017F COLORECTAL CA SCREEN DOC REV: CPT | Performed by: FAMILY MEDICINE

## 2021-07-08 PROCEDURE — 99213 OFFICE O/P EST LOW 20 MIN: CPT | Performed by: FAMILY MEDICINE

## 2021-07-08 RX ORDER — AZITHROMYCIN 250 MG/1
TABLET, FILM COATED ORAL
Qty: 1 PACKET | Refills: 0 | Status: SHIPPED | OUTPATIENT
Start: 2021-07-08 | End: 2021-07-18

## 2021-07-08 SDOH — ECONOMIC STABILITY: FOOD INSECURITY: WITHIN THE PAST 12 MONTHS, YOU WORRIED THAT YOUR FOOD WOULD RUN OUT BEFORE YOU GOT MONEY TO BUY MORE.: NEVER TRUE

## 2021-07-08 SDOH — ECONOMIC STABILITY: FOOD INSECURITY: WITHIN THE PAST 12 MONTHS, THE FOOD YOU BOUGHT JUST DIDN'T LAST AND YOU DIDN'T HAVE MONEY TO GET MORE.: NEVER TRUE

## 2021-07-08 ASSESSMENT — PATIENT HEALTH QUESTIONNAIRE - PHQ9
1. LITTLE INTEREST OR PLEASURE IN DOING THINGS: 0
SUM OF ALL RESPONSES TO PHQ QUESTIONS 1-9: 0
2. FEELING DOWN, DEPRESSED OR HOPELESS: 0
SUM OF ALL RESPONSES TO PHQ QUESTIONS 1-9: 0
SUM OF ALL RESPONSES TO PHQ QUESTIONS 1-9: 0
SUM OF ALL RESPONSES TO PHQ9 QUESTIONS 1 & 2: 0

## 2021-07-08 ASSESSMENT — SOCIAL DETERMINANTS OF HEALTH (SDOH): HOW HARD IS IT FOR YOU TO PAY FOR THE VERY BASICS LIKE FOOD, HOUSING, MEDICAL CARE, AND HEATING?: NOT HARD AT ALL

## 2021-07-08 NOTE — PROGRESS NOTES
Subjective:      Patient ID: Adalgisa Marin is a 60 y.o. female. Chief Complaint   Patient presents with    Congestion     headaches, runny nose, gunk in throat x weeks        Patient presents with:  Congestion: headaches, runny nose, gunk in throat x weeks    Here for the above  Facial pain  pnd  No fever  No myalgia and smell and taste is fine   Cough to clear the throat    YOB: 1965    Date of Visit:  7/8/2021     -- Niaspan (Niacin Er) -- Hives   -- Percocet (Oxycodone-Acetaminophen) -- Other (See Comments)    --  Dropped her blood pressure to low   -- Skelaxin (Metaxalone) -- Hives   -- Sulfamethoxazole-Trimethoprim -- Hives   -- Ciprofloxacin -- Rash   -- Omnicef -- Rash    Current Outpatient Medications:  levothyroxine (SYNTHROID) 100 MCG tablet, TAKE 1 TABLET BY MOUTH DAILY, Disp: 90 tablet, Rfl: 1  rosuvastatin (CRESTOR) 10 MG tablet, TAKE 1 TABLET BY MOUTH DAILY, Disp: 90 tablet, Rfl: 1  vitamin D (ERGOCALCIFEROL) 1.25 MG (55267 UT) CAPS capsule, Take 1 capsule by mouth every 30 days, Disp: 4 capsule, Rfl: 5  Omega-3 Fatty Acids (RA FISH OIL) 1400 MG CPDR, Take by mouth, Disp: , Rfl:    metoprolol tartrate (LOPRESSOR) 50 MG tablet, TAKE 1 TABLET BY MOUTH TWICE DAILY, Disp: 180 tablet, Rfl: 1  VITAMIN E BLEND PO, Take by mouth, Disp: , Rfl:   triamcinolone (NASACORT ALLERGY 24HR) 55 MCG/ACT nasal inhaler, 2 sprays by Nasal route daily, Disp: , Rfl:     No current facility-administered medications for this visit.      ---------------------------               07/08/21                      1617         ---------------------------   BP:          128/82         Site:    Left Upper Arm     Position:     Sitting        Cuff Size:   Large Adult      Temp:   98.4 °F (36.9 °C)   TempSrc:    Temporal        Weight: 192 lb (87.1 kg)    Height:  5' 7\" (1.702 m)   ---------------------------  Body mass index is 30.07 kg/m².      Wt Readings from Last 3 Encounters:  07/08/21 : 192 lb (87.1 kg)  04/06/21 : 187 lb 3.2 oz (84.9 kg)  11/20/20 : 186 lb 6.4 oz (84.6 kg)    BP Readings from Last 3 Encounters:  07/08/21 : 128/82  04/06/21 : 136/88  11/20/20 : 132/81        Review of Systems    Objective:   Physical Exam  Constitutional:       General: She is not in acute distress. Appearance: Normal appearance. She is well-developed. She is not ill-appearing or diaphoretic. HENT:      Head: Normocephalic and atraumatic. Right Ear: Tympanic membrane and ear canal normal.      Left Ear: Tympanic membrane and ear canal normal.      Nose: Nose normal.      Mouth/Throat:      Lips: Pink. Mouth: Mucous membranes are moist. No oral lesions. Pharynx: Oropharynx is clear. Uvula midline. Pulmonary:      Effort: Pulmonary effort is normal. No tachypnea, accessory muscle usage or respiratory distress. Breath sounds: Normal breath sounds. No decreased breath sounds, wheezing, rhonchi or rales. Musculoskeletal:      Cervical back: Neck supple. Lymphadenopathy:      Head:      Right side of head: No submental or submandibular adenopathy. Left side of head: No submental or submandibular adenopathy. Cervical: No cervical adenopathy. Upper Body:      Right upper body: No supraclavicular adenopathy. Left upper body: No supraclavicular adenopathy. Skin:     General: Skin is warm and dry. Coloration: Skin is not pale. Neurological:      Mental Status: She is alert. Assessment:       Diagnosis Orders   1. Acute upper respiratory infection, unspecified  azithromycin (ZITHROMAX) 250 MG tablet     Orders Placed This Encounter   Medications    azithromycin (ZITHROMAX) 250 MG tablet     Sig: Take 2 tabs (500 mg) on Day 1, and take 1 tab (250 mg) on days 2 through 5.      Dispense:  1 packet     Refill:  0           Plan:      Do take the antibiotic  Call if not better        Ann Murguia MD

## 2021-08-09 ENCOUNTER — OFFICE VISIT (OUTPATIENT)
Dept: FAMILY MEDICINE CLINIC | Age: 56
End: 2021-08-09
Payer: COMMERCIAL

## 2021-08-09 ENCOUNTER — NURSE TRIAGE (OUTPATIENT)
Dept: OTHER | Facility: CLINIC | Age: 56
End: 2021-08-09

## 2021-08-09 VITALS
WEIGHT: 190.6 LBS | TEMPERATURE: 97.4 F | HEIGHT: 67 IN | DIASTOLIC BLOOD PRESSURE: 84 MMHG | BODY MASS INDEX: 29.91 KG/M2 | SYSTOLIC BLOOD PRESSURE: 118 MMHG

## 2021-08-09 DIAGNOSIS — J01.81 OTHER ACUTE RECURRENT SINUSITIS: Primary | ICD-10-CM

## 2021-08-09 PROCEDURE — G8427 DOCREV CUR MEDS BY ELIG CLIN: HCPCS | Performed by: NURSE PRACTITIONER

## 2021-08-09 PROCEDURE — 1036F TOBACCO NON-USER: CPT | Performed by: NURSE PRACTITIONER

## 2021-08-09 PROCEDURE — G8417 CALC BMI ABV UP PARAM F/U: HCPCS | Performed by: NURSE PRACTITIONER

## 2021-08-09 PROCEDURE — 99213 OFFICE O/P EST LOW 20 MIN: CPT | Performed by: NURSE PRACTITIONER

## 2021-08-09 PROCEDURE — 3017F COLORECTAL CA SCREEN DOC REV: CPT | Performed by: NURSE PRACTITIONER

## 2021-08-09 RX ORDER — AMOXICILLIN AND CLAVULANATE POTASSIUM 875; 125 MG/1; MG/1
1 TABLET, FILM COATED ORAL 2 TIMES DAILY
Qty: 14 TABLET | Refills: 0 | Status: SHIPPED | OUTPATIENT
Start: 2021-08-09 | End: 2021-08-16

## 2021-08-09 ASSESSMENT — ENCOUNTER SYMPTOMS
SORE THROAT: 1
TROUBLE SWALLOWING: 0
DIARRHEA: 0
ABDOMINAL DISTENTION: 0
SHORTNESS OF BREATH: 0
WHEEZING: 0
COUGH: 0
SINUS PRESSURE: 1
EYES NEGATIVE: 1
NAUSEA: 0
SINUS PAIN: 1

## 2021-08-09 NOTE — TELEPHONE ENCOUNTER
Received call from [facility] at [facility] with Red Flag Complaint. Brief description of triage: Pt calls in reporting vertigo. Recent tx for sinus infection, congestion persists. See below assessment. Triage indicates for patient to be seen in office today. Pt advised if unable to be seen should proceed to clinic or C and pt agreeable with plan. Care advice provided, patient verbalizes understanding; denies any other questions or concerns; instructed to call back for any new or worsening symptoms. Writer provided warm transfer to Maria Fernanda Jimenez at [facility] for appointment scheduling. Attention Provider: Thank you for allowing me to participate in the care of your patient. The patient was connected to triage in response to information provided to the [facility]. Please do not respond through this encounter as the response is not directed to a shared pool. Reason for Disposition   Patient wants to be seen    Answer Assessment - Initial Assessment Questions  1. DESCRIPTION: \"Describe your dizziness. \"      Room is spinning    2. LIGHTHEADED: \"Do you feel lightheaded? \" (e.g., somewhat faint, woozy, weak upon standing)      Denies    3. VERTIGO: \"Do you feel like either you or the room is spinning or tilting? \" (i.e. vertigo)      Yes    4. SEVERITY: \"How bad is it? \"  \"Do you feel like you are going to faint? \" \"Can you stand and walk? \"    - MILD - walking normally    - MODERATE - interferes with normal activities (e.g., work, school)     - SEVERE - unable to stand, requires support to walk, feels like passing out now. Mild, changes positions slowly    5. ONSET:  \"When did the dizziness begin? \"      Woke up Thursday morning with this symptom    6. AGGRAVATING FACTORS: \"Does anything make it worse? \" (e.g., standing, change in head position)      Change in position    7. HEART RATE: \"Can you tell me your heart rate? \" \"How many beats in 15 seconds? \"  (Note: not all patients can do this)

## 2021-08-09 NOTE — PROGRESS NOTES
Carol Winn (:  1965) is a 60 y.o. female,Established patient, here for evaluation of the following chief complaint(s):  Dizziness (patient c/o episodes of vertigo x 5 days; pressure in ears and sinuses)         ASSESSMENT/PLAN:  1. Other acute recurrent sinusitis  Start Augmentin today. Take Claritin and Nasacort routinely for the next 30 days. Follow pt education and instructions. Return if symptoms worsen or fail to improve. Subjective   SUBJECTIVE/OBJECTIVE:  HPI  Presents today with complaints of a sinus infection over last month. States she took a zpak and had mild improvement. Dizziness over the last 5 days, associated with sinus and ear congestion. Greenish draingage from nose, sinsus pressure and post nasal drip with sore throat. Ear pressure, \"it feels like fluid is draing out of my ears\". Takes claritin for allergies, and nasacort at times. Denies fever, chills, n/v or abd pain. Denies exposure to others that have been ill. Current Outpatient Medications   Medication Sig Dispense Refill    amoxicillin-clavulanate (AUGMENTIN) 875-125 MG per tablet Take 1 tablet by mouth 2 times daily for 7 days 14 tablet 0    levothyroxine (SYNTHROID) 100 MCG tablet TAKE 1 TABLET BY MOUTH DAILY 90 tablet 1    rosuvastatin (CRESTOR) 10 MG tablet TAKE 1 TABLET BY MOUTH DAILY 90 tablet 1    vitamin D (ERGOCALCIFEROL) 1.25 MG (18069 UT) CAPS capsule Take 1 capsule by mouth every 30 days 4 capsule 5    Omega-3 Fatty Acids (RA FISH OIL) 1400 MG CPDR Take by mouth      metoprolol tartrate (LOPRESSOR) 50 MG tablet TAKE 1 TABLET BY MOUTH TWICE DAILY (Patient taking differently: Take 50 mg by mouth daily ) 180 tablet 1    VITAMIN E BLEND PO Take by mouth      triamcinolone (NASACORT ALLERGY 24HR) 55 MCG/ACT nasal inhaler 2 sprays by Nasal route daily       No current facility-administered medications for this visit.         Past Medical History:   Diagnosis Date    Abdominal pain, other specified site     AR (allergic rhinitis)     Diarrhea     Hyperlipidemia     Impaired fasting glucose     Migraine     Rectal bleeding     Reflux gastritis     Thyroid disease     hypothyroidism        Review of Systems   Constitutional: Negative for activity change, chills, fatigue and fever. HENT: Positive for congestion, postnasal drip, sinus pressure, sinus pain and sore throat. Negative for trouble swallowing. Eyes: Negative. Respiratory: Negative for cough, shortness of breath and wheezing. Gastrointestinal: Negative for abdominal distention, diarrhea and nausea. Neurological: Positive for dizziness. Negative for headaches. Objective   Physical Exam  Constitutional:       General: She is not in acute distress. Appearance: She is not ill-appearing. HENT:      Right Ear: Ear canal and external ear normal.      Left Ear: Ear canal and external ear normal. A middle ear effusion is present. Eyes:      Extraocular Movements: Extraocular movements intact. Conjunctiva/sclera: Conjunctivae normal.      Pupils: Pupils are equal, round, and reactive to light. Cardiovascular:      Rate and Rhythm: Normal rate and regular rhythm. Heart sounds: Normal heart sounds. No murmur heard. No gallop. Pulmonary:      Effort: Pulmonary effort is normal.      Breath sounds: No wheezing. Skin:     General: Skin is warm and dry. Neurological:      Mental Status: She is alert.               --ALEXANDER Bedolla - NP

## 2021-09-13 ENCOUNTER — TELEPHONE (OUTPATIENT)
Dept: FAMILY MEDICINE CLINIC | Age: 56
End: 2021-09-13

## 2021-09-13 ENCOUNTER — OFFICE VISIT (OUTPATIENT)
Dept: FAMILY MEDICINE CLINIC | Age: 56
End: 2021-09-13
Payer: COMMERCIAL

## 2021-09-13 VITALS
SYSTOLIC BLOOD PRESSURE: 122 MMHG | BODY MASS INDEX: 29.82 KG/M2 | WEIGHT: 190 LBS | HEIGHT: 67 IN | TEMPERATURE: 97.3 F | DIASTOLIC BLOOD PRESSURE: 80 MMHG

## 2021-09-13 DIAGNOSIS — R09.81 SINUS CONGESTION: Primary | ICD-10-CM

## 2021-09-13 PROCEDURE — G8417 CALC BMI ABV UP PARAM F/U: HCPCS | Performed by: FAMILY MEDICINE

## 2021-09-13 PROCEDURE — G8427 DOCREV CUR MEDS BY ELIG CLIN: HCPCS | Performed by: FAMILY MEDICINE

## 2021-09-13 PROCEDURE — 3017F COLORECTAL CA SCREEN DOC REV: CPT | Performed by: FAMILY MEDICINE

## 2021-09-13 PROCEDURE — 99213 OFFICE O/P EST LOW 20 MIN: CPT | Performed by: FAMILY MEDICINE

## 2021-09-13 PROCEDURE — 1036F TOBACCO NON-USER: CPT | Performed by: FAMILY MEDICINE

## 2021-09-13 RX ORDER — MONTELUKAST SODIUM 10 MG/1
10 TABLET ORAL NIGHTLY
Qty: 30 TABLET | Refills: 0 | Status: SHIPPED | OUTPATIENT
Start: 2021-09-13 | End: 2021-10-08

## 2021-09-13 NOTE — TELEPHONE ENCOUNTER
----- Message from Katerin Burkett sent at 9/13/2021  9:17 AM EDT -----  Subject: Message to Provider    QUESTIONS  Information for Provider? Pt requests appt due to vertigo from a sinus   infection. Pt refused RN triage. ---------------------------------------------------------------------------  --------------  Moni STANFORD  What is the best way for the office to contact you? OK to leave message on   voicemail  Preferred Call Back Phone Number? [phone]  ---------------------------------------------------------------------------  --------------  SCRIPT ANSWERS  Relationship to Patient?  Self

## 2021-09-13 NOTE — PROGRESS NOTES
Subjective:      Patient ID: Roxann Kingston is a 60 y.o. female.     Chief Complaint   Patient presents with    Congestion    Dizziness     x this morning        Patient presents with:  Congestion  Dizziness: x this morning    She has had pbs with congestion and some dizzy   Some ear pain  No fever  pnd and she feels congested and has sinus pain  No cough   Going on since June    Sudafed seems to have helped some     YOB: 1965    Date of Visit:  9/13/2021     -- Niaspan (Niacin Er) -- Hives   -- Percocet (Oxycodone-Acetaminophen) -- Other (See Comments)    --  Dropped her blood pressure to low   -- Skelaxin (Metaxalone) -- Hives   -- Sulfamethoxazole-Trimethoprim -- Hives   -- Ciprofloxacin -- Rash   -- Omnicef -- Rash    Current Outpatient Medications:  levothyroxine (SYNTHROID) 100 MCG tablet, TAKE 1 TABLET BY MOUTH DAILY, Disp: 90 tablet, Rfl: 1  rosuvastatin (CRESTOR) 10 MG tablet, TAKE 1 TABLET BY MOUTH DAILY, Disp: 90 tablet, Rfl: 1  vitamin D (ERGOCALCIFEROL) 1.25 MG (49652 UT) CAPS capsule, Take 1 capsule by mouth every 30 days, Disp: 4 capsule, Rfl: 5  Omega-3 Fatty Acids (RA FISH OIL) 1400 MG CPDR, Take by mouth, Disp: , Rfl:    metoprolol tartrate (LOPRESSOR) 50 MG tablet, TAKE 1 TABLET BY MOUTH TWICE DAILY (Patient taking differently: Take 50 mg by mouth daily ), Disp: 180 tablet, Rfl: 1  VITAMIN E BLEND PO, Take by mouth, Disp: , Rfl:   triamcinolone (NASACORT ALLERGY 24HR) 55 MCG/ACT nasal inhaler, 2 sprays by Nasal route daily, Disp: , Rfl:     No current facility-administered medications for this visit.      ---------------------------               09/13/21                      1312         ---------------------------   BP:          122/80         Site:    Left Upper Arm     Position:     Sitting        Cuff Size:  Medium Adult      Temp:   97.3 °F (36.3 °C)   TempSrc:    Temporal        Weight: 190 lb (86.2 kg)    Height:  5' 7\" (1.702 m) ---------------------------  Body mass index is 29.76 kg/m². Wt Readings from Last 3 Encounters:  09/13/21 : 190 lb (86.2 kg)  08/09/21 : 190 lb 9.6 oz (86.5 kg)  07/08/21 : 192 lb (87.1 kg)    BP Readings from Last 3 Encounters:  09/13/21 : 122/80  08/09/21 : 118/84  07/08/21 : 128/82              Review of Systems    Objective:   Physical Exam  Constitutional:       General: She is not in acute distress. Appearance: Normal appearance. She is well-developed. She is not ill-appearing or diaphoretic. HENT:      Head: Normocephalic and atraumatic. Right Ear: Tympanic membrane and ear canal normal.      Left Ear: Tympanic membrane and ear canal normal.      Nose: Congestion present. Right Sinus: Frontal sinus tenderness present. Left Sinus: Frontal sinus tenderness present. Mouth/Throat:      Lips: Pink. Mouth: Mucous membranes are moist. No oral lesions. Pharynx: Oropharynx is clear. Uvula midline. Pulmonary:      Effort: Pulmonary effort is normal. No tachypnea, accessory muscle usage or respiratory distress. Breath sounds: Normal breath sounds. No decreased breath sounds, wheezing, rhonchi or rales. Musculoskeletal:      Cervical back: Neck supple. Lymphadenopathy:      Head:      Right side of head: No submental or submandibular adenopathy. Left side of head: No submental or submandibular adenopathy. Cervical: No cervical adenopathy. Upper Body:      Right upper body: No supraclavicular adenopathy. Left upper body: No supraclavicular adenopathy. Skin:     General: Skin is warm and dry. Coloration: Skin is not pale. Neurological:      Mental Status: She is alert. Assessment:        Diagnosis Orders   1.  Sinus congestion       Orders Placed This Encounter   Medications    montelukast (SINGULAIR) 10 MG tablet     Sig: Take 1 tablet by mouth nightly     Dispense:  30 tablet     Refill:  0       She is using claritin nasacort currently        Plan:      See the ENT doctor  Do use the additional medication         Carlin Dubon MD

## 2021-09-16 ENCOUNTER — OFFICE VISIT (OUTPATIENT)
Dept: ENT CLINIC | Age: 56
End: 2021-09-16
Payer: COMMERCIAL

## 2021-09-16 VITALS
WEIGHT: 189 LBS | SYSTOLIC BLOOD PRESSURE: 127 MMHG | DIASTOLIC BLOOD PRESSURE: 79 MMHG | BODY MASS INDEX: 29.6 KG/M2 | HEART RATE: 79 BPM

## 2021-09-16 DIAGNOSIS — G43.809 OTHER MIGRAINE WITHOUT STATUS MIGRAINOSUS, NOT INTRACTABLE: ICD-10-CM

## 2021-09-16 DIAGNOSIS — H81.11 BENIGN PAROXYSMAL POSITIONAL VERTIGO OF RIGHT EAR: Primary | ICD-10-CM

## 2021-09-16 DIAGNOSIS — J32.9 CHRONIC SINUSITIS, UNSPECIFIED LOCATION: ICD-10-CM

## 2021-09-16 DIAGNOSIS — J30.9 ALLERGIC RHINITIS, UNSPECIFIED SEASONALITY, UNSPECIFIED TRIGGER: ICD-10-CM

## 2021-09-16 PROCEDURE — 31231 NASAL ENDOSCOPY DX: CPT | Performed by: OTOLARYNGOLOGY

## 2021-09-16 PROCEDURE — 99204 OFFICE O/P NEW MOD 45 MIN: CPT | Performed by: OTOLARYNGOLOGY

## 2021-09-16 PROCEDURE — G8417 CALC BMI ABV UP PARAM F/U: HCPCS | Performed by: OTOLARYNGOLOGY

## 2021-09-16 PROCEDURE — G8427 DOCREV CUR MEDS BY ELIG CLIN: HCPCS | Performed by: OTOLARYNGOLOGY

## 2021-09-16 RX ORDER — AZELASTINE 1 MG/ML
1 SPRAY, METERED NASAL 2 TIMES DAILY
Qty: 60 ML | Refills: 1 | Status: SHIPPED | OUTPATIENT
Start: 2021-09-16 | End: 2022-01-31

## 2021-09-16 NOTE — PROGRESS NOTES
[facility]      Patient Name: Shan [address] Record Number:  <[id number]>  Primary Care Physician:  Zenaida Burnette MD  Date of Consultation: 9/16/2021    Chief Complaint: Congestion, dizziness        HISTORY OF PRESENT ILLNESS  Miriam Juan is a(n) 60 y.o. female who presents for evaluation of congestion and dizziness. The patient is complaining of chronic congestion. She said that this started over the summer, but does say that she has a lifelong history of allergies. She describes some facial pressure. Some rhinorrhea. Her sense of smell is normal.  Her ability to breathe out of her nose is normal.  Has had antibiotics that seem to help, but this recurs. Reportedly took allergy shots for 40 years. Is on Singulair, nasal steroid, oral antihistamine as well as Sudafed. Patient does have a history of migraine headaches. She used to get them very frequently, but the frequency decreased. Is currently going through has gone through menopause. Describes dizziness mostly with head movement. It is brief and lasts seconds each time. She describes true vertigo. Denies head trauma. Does not seem to have significant headaches associated with it. REVIEW OF SYSTEMS  As above    PHYSICAL EXAM  GENERAL: No Acute Distress, Alert and Oriented, no Hoarseness, strong voice  EYES: EOMI, Anti-icteric  HENT:   Head: Normocephalic and atraumatic. Face:  Symmetric, facial nerve intact, no sinus tenderness  Right Ear: Normal external ear, normal external auditory canal, intact tympanic membrane with normal mobility and aerated middle ear  Left Ear: Normal external ear, normal external auditory canal, intact tympanic membrane with normal mobility and aerated middle ear  Mouth/Oral Cavity:  normal lips, Uvula is midline, no mucosal lesions  Oropharynx/Larynx:  normal oropharynx  Nose:Normal external nasal appearance.   See below  NECK: Normal range of motion, no thyromegaly, trachea is midline, no lymphadenopathy, no neck masses, no crepitus  CHEST: Normal respiratory effort, no retractions, breathing comfortably  SKIN: No rashes, normal appearing skin, no evidence of skin lesions/tumors  Neuro: Patient had subjective vertigo with a bit of nystagmus with right ear down Jai-Hallpike. I did an Epley maneuver. Minimal change in symptoms after 1 Epley  Cardio:  no edema      PROCEDURE  Nasal endoscopy  Afrin and lidocaine were applied the bilateral nasal cavity  Rigid scope was used to visualize the bilateral nasal cavity. On the left side she has some mucosal edema. No polyps or purulent drainage. Normal nasopharynx. On the right side again no polyps or purulent drainage. Minimal septal deviation. ASSESSMENT/PLAN  1. Benign paroxysmal positional vertigo of right ear  This patient seems to have BPPV of the right ear. She did not respond to an Epley in clinic. I will have her see physical therapy. She has a history of migraine headaches and we know that people with migraines have a much harder time with BPPV. - PT vestibular rehab; Future    2. Other migraine without status migrainosus, not intractable  Patient has history of migraines. She could be having some dizziness and facial pressure related to migraine headaches. Sometimes when people go through hormonal changes the nature of the migraines will change. If she is not responding to other treatments, we will have to have her see a neurologist or somebody for migraines    3. Allergic rhinitis, unspecified seasonality, unspecified trigger  A lot of patient's symptoms are actually more allergy related than sinuses. I would add in Astelin. 4. Chronic sinusitis, unspecified location  I would get a CT scan just to rule out chronic sinusitis. I would like for her to get that between now and when she follows up after seeing physical therapy. - CT SINUS FOR IMAGE GUIDANCE;  Future             I have performed a head and neck physical exam personally or was physically present during the key or critical portions of the service. This note was generated completely or in part utilizing Dragon dictation speech recognition software. Occasionally, words are mistranscribed and despite editing, the text may contain inaccuracies due to incorrect word recognition. If further clarification is needed please contact the office at [phone].

## 2021-09-22 ENCOUNTER — OFFICE VISIT (OUTPATIENT)
Dept: ENT CLINIC | Age: 56
End: 2021-09-22
Payer: COMMERCIAL

## 2021-09-22 ENCOUNTER — HOSPITAL ENCOUNTER (OUTPATIENT)
Dept: CT IMAGING | Age: 56
Discharge: HOME | End: 2021-09-22
Payer: COMMERCIAL

## 2021-09-22 VITALS
DIASTOLIC BLOOD PRESSURE: 73 MMHG | WEIGHT: 188 LBS | HEART RATE: 71 BPM | HEIGHT: 67 IN | SYSTOLIC BLOOD PRESSURE: 139 MMHG | BODY MASS INDEX: 29.51 KG/M2

## 2021-09-22 DIAGNOSIS — J32.9 CHRONIC SINUSITIS, UNSPECIFIED LOCATION: ICD-10-CM

## 2021-09-22 DIAGNOSIS — H81.11 BENIGN PAROXYSMAL POSITIONAL VERTIGO OF RIGHT EAR: Primary | ICD-10-CM

## 2021-09-22 DIAGNOSIS — J30.9 ALLERGIC RHINITIS, UNSPECIFIED SEASONALITY, UNSPECIFIED TRIGGER: ICD-10-CM

## 2021-09-22 PROCEDURE — 70486 CT MAXILLOFACIAL W/O DYE: CPT

## 2021-09-22 PROCEDURE — G8427 DOCREV CUR MEDS BY ELIG CLIN: HCPCS | Performed by: OTOLARYNGOLOGY

## 2021-09-22 PROCEDURE — 99213 OFFICE O/P EST LOW 20 MIN: CPT | Performed by: OTOLARYNGOLOGY

## 2021-09-22 PROCEDURE — 3017F COLORECTAL CA SCREEN DOC REV: CPT | Performed by: OTOLARYNGOLOGY

## 2021-09-22 PROCEDURE — G8417 CALC BMI ABV UP PARAM F/U: HCPCS | Performed by: OTOLARYNGOLOGY

## 2021-09-22 PROCEDURE — 1036F TOBACCO NON-USER: CPT | Performed by: OTOLARYNGOLOGY

## 2021-09-22 NOTE — PROGRESS NOTES
[facility] [address] [facility]  Follow up      Patient Name: [address] [place] [address] Record Number:  <[id number]>  Primary Care Physician:  Belem Chester MD  Date of Consultation: 9/22/2021    Chief Complaint: Nasal issues        Interval History  Patient is following up for her nasal issues. I saw her on September 16. We get a CT scan to rule out chronic sinusitis. I started her on Astelin and she says that its been helping a lot. In addition she has scheduled an appointment with physical therapy regarding possible BPPV of the right ear. REVIEW OF SYSTEMS  As above    PHYSICAL EXAM  GENERAL: No Acute Distress, Alert and Oriented, no Hoarseness, strong voice  EYES: EOMI, Anti-icteric  HENT:   Head: Normocephalic and atraumatic. Face:  Symmetric, facial nerve intact, no sinus tenderness  Right Ear: Normal external ear, normal external auditory canal, intact tympanic membrane with normal mobility and aerated middle ear  Left Ear: Normal external ear, normal external auditory canal, intact tympanic membrane with normal mobility and aerated middle ear  Mouth/Oral Cavity:  normal lips, Uvula is midline  Nose: Anterior rhinoscopy shows a little bit of a leftward septal deviation without any polyps or purulent drainage    RADIOLOGY  Summary of findings:  I reviewed the CT scan of the sinuses. She does not have any evidence of sinusitis. Well aerated mastoids and middle ear spaces. ASSESSMENT/PLAN  1. Benign paroxysmal positional vertigo of right ear  Patient has an appointment with physical therapy for this. I had like to see her after physical therapy. 2. Allergic rhinitis, unspecified seasonality, unspecified trigger  She is already noticed a difference with the Astelin. She can continue this    3. Chronic sinusitis, unspecified location  I do not think the patient has any evidence whatsoever chronic sinusitis.   She continues to have some the facial pressure and issues we may have to consider neurology versus allergy testing. I have performed a head and neck physical exam personally or was physically present during the key or critical portions of the service. This note was generated completely or in part utilizing Dragon dictation speech recognition software. Occasionally, words are mistranscribed and despite editing, the text may contain inaccuracies due to incorrect word recognition. If further clarification is needed please contact the office at [phone].

## 2021-09-23 ENCOUNTER — HOSPITAL ENCOUNTER (OUTPATIENT)
Dept: PHYSICAL THERAPY | Age: 56
Setting detail: THERAPIES SERIES
Discharge: HOME | End: 2021-09-23
Payer: COMMERCIAL

## 2021-09-23 PROCEDURE — 97112 NEUROMUSCULAR REEDUCATION: CPT

## 2021-09-23 PROCEDURE — 97161 PT EVAL LOW COMPLEX 20 MIN: CPT

## 2021-09-23 NOTE — PLAN OF CARE
[address] [facility],[address], [place]  [address] [facility]  Phone: [phone]   Fax:     [phone]                                                       Physical Therapy Certification    Dear Referring Practitioner: Dr. Patrick Laguerre,    We had the pleasure of evaluating the following patient for physical therapy services at [address]. A summary of our findings can be found in the initial assessment below. This includes our plan of care. If you have any questions or concerns regarding these findings, please do not hesitate to contact me at the office phone number checked above. Thank you for the referral.       Physician Signature:_______________________________Date:__________________  By signing above (or electronic signature), therapists plan is approved by physician          Patient: Jim Villatoro   : 1965   MRN: [id number]  Referring Physician: Referring Practitioner: Dr. Patrick Laguerre      Evaluation Date: 2021        Medical Diagnosis Information:  Diagnosis: BPPV R ear H81.11   Treatment Diagnosis: dizziness limiting transfers, mobility, gait                                           Insurance information: PT Insurance Information: [facility] ( used 8)     Precautions/ Contra-indications:   Latex Allergy:  [x]NO      []YES  Preferred Language for Healthcare:   [x]English       []other:    C-SSRS Triggered by Intake questionnaire (Past 2 wk assessment ):   [x] No, Questionnaire did not trigger screening.   [] Yes, Patient intake triggered C-SSRS Screening       [] C-SSRS Screening completed  [] PCP notified via Epic     SUBJECTIVE: Patient stated complaint: Pt c/o onset of symptoms in  during a bad sinus infection when she rolled in bed and had vertigo. Pt demonstrated + GABBY Pedraza at MD visit on  and MD treated with R Epley that day as well.   MD doc minimal change noted after Epley, pt states s/s are somewhat better after that session. Relevant Medical History: Additional Pertinent Hx: thyroid, migraine  Pain Scale: 0/10       Type: []Constant   [x]Intermittent  []Radiating []Localized []other:    Dizziness Scale: 5-6/10    Description of symptoms: [x] Vertigo []  Off-balance [] Lightheadedness    Symptoms are getting:  [x] Better [] Worse  [] Same      [] Episodic    Description of Spells: [] Constant [] Spontaneous [] Motion Induced [x] Induced by position changes [] Other:    Length of time spells occur: [x] Seconds [] Minutes  [] Hours [] Days [] Other:     Date of onset: 6/1/21    Setting in which symptoms first occurred:rolling in bed     What increases/provokes symptoms? Bed mobility, turning head     What decreases/eases symptoms? Sitting still     Hearing impairments:  [] Yes  [x] No  [] Other:     Hearing changes since onset:  [] Yes  [x] No  [] Other:    Visual changes since onset: [] Yes  [x] No  [] Other:    Recent falls:    [] Yes  [x] No     Comments:      History of migraines/HA:  [x] Yes  [] No    Comments:    Previous treatments: no    Job requirements/work status: [employer]     Occupation/School:     Living Status/Prior Level of Function: Prior to this injury / incident, patient was independent with ADLs and IADLs,     OBJECTIVE:     Musculoskeletal Screen  Cervical spine complaints:  Pt has recently started PT for UE c/o with some intermittent neck c/o   Cervical Pain:   Cervical spine ROM:  []  WNL [] Impaired:      Posture: WNL    Somatosensory: WNL  Light touch:  [] Normal [] Impaired Comments:    Postural Control Tests: NT  Clinical Test of Sensory Interaction for Balance (CTSIB) performed in Romberg stance  CONDITION TIME STRATEGY SWAY    Eyes open, firm surface       Eyes closed, firm surface       Eyes open, foam surface       Eyes closed, foam surface        Gait:    Assistive Device: no     Orthosis: no   At self-initiated pace:  Lea:  WNL   EVENS: WNL        Arm swing: WNL   Head/trunk rotation: WNL      Straight path: Y  Swerves: N      Staggers: N   Side-steps: N   Gait speed: WNL    Oculomotor/Vestibular Examination:    Spontaneous nystagmus:  [] Left  [] Right [x] Absent  Gaze-Evoked nystagmus with fixation present:   Primary [] Present [x] Absent   Right  [] Present [x] Absent   Left  [] Present [x] Absent  VOR Head Thrust Test: [x] Normal [] Abnormal  Comments:   VOR Cancellation:  [x] Normal [] Abnormal Comments:   Smooth Pursuit:  [x] Normal [] Abnormal Comments:   Saccades:   [] Normal [x] Abnormal Comments: mild jerkiness with eye movement vertically  Convergence:   [x] Normal [] Abnormal Comments:   Static Visual Acuity:    Dynamic Visual Acuity:    Positional Testing  R Hallpike-Exmore maneuver:    Nystagmus:  [] Yes  [x] No  [] Duration:       [] Direction:    Vertigo:  [] Yes  [x] No  [] Duration:   L Hallpike-Exmore maneuver:   Nystagmus:  [] Yes  [x] No  [] Duration:       [] Direction:    Vertigo:  [] Yes  [x] No  [] Duration:   Supine roll head right:   Nystagmus:  [] Yes  [x] No  [] Duration:       [] Direction:    Vertigo:  [] Yes  [x] No  [] Duration:   Supine roll head left:   Nystagmus:  [] Yes  [x] No  [] Duration:       [] Direction:    Vertigo:  [] Yes  [x] No  [] Duration:     Outcome Measures  Dizziness Handicap Index (DHI)    24  Functional Gait Assessment (FGA)      Lunsford Balance Scale        Timed Get \"Up and Go\"       5 time sit-to-stand        Activities Specific Balance Confidence Scale (ABC)    Motion Sensitivity Quotient (MSQ)      Visual Vertigo Analogue Scale (VVAS)       [x] Patient history, allergies, meds reviewed. Medical chart reviewed. See intake form. Review of Systems (ROS):  [x]Performed Review of systems (Integumentary, Cardiopulmonary, Neurological) by intake and observation. Intake form has been scanned into medical record.  Patient has been instructed to contact their primary care physician regarding ROS issues if not already being addressed at this time.       Co-morbidities/Complexities (which will affect course of rehabilitation):   []None           Arthritic conditions   []Rheumatoid arthritis (M05.9)  []Osteoarthritis (M19.91)   Cardiovascular conditions   []Hypertension (I10)  []Hyperlipidemia (E78.5)  []Angina pectoris (I20)  []Atherosclerosis (I70)   Musculoskeletal conditions   []Disc pathology   []Congenital spine pathologies   []Prior surgical intervention  []Osteoporosis (M81.8)  []Osteopenia (M85.8)   Endocrine conditions   [x]Hypothyroid (E03.9)  [x]Hyperthyroid Gastrointestinal conditions   []Constipation (X47.08)   Metabolic conditions   []Morbid obesity (E66.01)  []Diabetes type 1(E10.65) or 2 (E11.65)   []Neuropathy (G60.9)     Pulmonary conditions   []Asthma (J45)  []Coughing   []COPD (J44.9)   Psychological Disorders  []Anxiety (F41.9)  []Depression (F32.9)   []Other:   []Other:     MIGRAINE      Barriers to/and or personal factors that will affect rehab potential:              []Age  []Sex    []Smoker              []Motivation/Lack of Motivation                        [x]Co-Morbidities              []Cognitive Function, education/learning barriers              []Environmental, home barriers              []profession/work barriers  []past PT/medical experience  []other:  Justification:     ASSESSMENT:      Functional Impairments:  Problem List/Functional Limitations:   [] BPPV    [] Right [] Posterior Canal [] Canalithiasis    [] Left  [] Horizontal Canal [] Cupulolithiasis   [] Decreased Gaze Stabilization    [x] Increased Motion Sensitivity   [] Unilateral Vestibular Hypofunction [] Bilateral Vestibular Hypofunction   [] Gait Instability    [] Decreased tolerance for ADLs    [] Decreased functional strength   [] Reduced Balance/Proprioceptive control   [] Reduced ability to hear/focus   [] Noted cervical/thoracic/GHJ joint hypomobility   [] Noted cervical/thoracic/GHJ joint hypermobility   [] Decreased cervical/UE functional ROM   [] Noted Headache pain aggravated by neck movements with/without dizziness   [] Abnormal reflexes/sensation/myotomal/dermatomal deficits   [] Decreased DCF control or ability to hold head up   [] Decreased RC/scapular/core strength and neuromuscular control     Functional Activity Limitations (from functional questionnaire and intake)   []Reduced ability to tolerate prolonged functional positions   [x]Reduced ability or difficulty with changes of positions or transfers between positions  []Reduced ability to transfer in/out of bed or rolling in bed   []Reduced ability or tolerance with driving, reading and/or computer work   []Reduced ability to perform lifting, reaching, carrying tasks   []Reduced ability to forward bend   []Reduced ability to ambulate prolonged functional periods/distances/surfaces   []Reduced ability to ascend/descend stairs  []Reduced ability to concentrate/focus  []Reduced ability to turn/pitch head rapidly  []Reduced ability to self-correct for losses of balance []other:       Participation Restrictions   [x]Reduced participation in self-care activities   [x]Reduced participation in home management activities   []Reduced participation in work activities   [x]Reduced participation in social activities   []Reduced participation in sport/recreational activities    Classification:   [x]Signs/symptoms consistent with BPPV (benign paroxysmal positional vertigo)      []Signs/symptoms consistent with unilateral peripheral vestibulopathy (i.e., vestibular neuritis, labyrinthitis, acoustic neuroma)   []Signs/symptoms consistent with central vestibulopathy  []Signs/symptoms consistent with migraine-related vestibulopathy  []Signs/symptoms consistent with Meniere's disease / post-traumatic hydrops  []Signs/symptoms consistent with perilymphatic fistula   []Signs/symptoms consistent with cervicogenic dizziness  []Signs/symptoms consistent with gait instability   [x]Signs/symptoms consistent with motion sensitivity    []signs/symptoms consistent with neck pain with mobility deficits     []signs/symptoms consistent with neck pain with movement coordinated impairments    []signs/symptoms consistent with neck pain with radiating pain    []signs/symptoms consistent with neck pain with headaches (cervicogenic)    []Signs/symptoms consistent with nerve root involvement including myotome & dermatome dysfunction   []sign/symptoms consistent with facet dysfunction of cervical and thoracic spine    []signs/symptoms consistent suggesting central cord compression/UMN syndromes   []signs/symptoms consistent with discogenic cervical pain   []signs/symptoms consistent with rib dysfunction   []signs/symptoms consistent with postural dysfunction   []signs/symptoms consistent with shoulder pathology    []signs/symptoms consistent with post-surgical status including decreased ROM, strength and function.    []signs/symptoms consistent with pathology which may benefit from Dry Needling  []signs/symptoms which may limit the use of advanced manual therapy techniques: (Elevated CV risk profile, recent trauma, intolerance to end range positions, prior TIA, visual issues, UE neurological compromise)   []other:      Prognosis/Rehab Potential:      []Excellent   [x]Good    []Fair   []Poor    Tolerance of evaluation/treatment:    []Excellent   [x]Good    []Fair   []Poor     Physical Therapy Evaluation Complexity Justification  [x] A history of present problem with:  [] no personal factors and/or comorbidities that impact the plan of care;  [x]1-2 personal factors and/or comorbidities that impact the plan of care  []3 personal factors and/or comorbidities that impact the plan of care  [x] An examination of body systems using standardized tests and measures addressing any of the following: body structures and functions (impairments), activity limitations, and/or participation restrictions;:  [x] a total of 1-2 or more elements   [] a total of 3 or more elements   [] a total of 4 or more elements   [x] A clinical presentation with:  [x] stable and/or uncomplicated characteristics   [] evolving clinical presentation with changing characteristics  [] unstable and unpredictable characteristics;   [x] Clinical decision making of [x] low, [] moderate, [] high complexity using standardized patient assessment instrument and/or measurable assessment of functional outcome. [x] EVAL (LOW) 67867 (typically 15 minutes face-to-face)  [] EVAL (MOD) 48734 (typically 30 minutes face-to-face)  [] EVAL (HIGH) 62760 (typically 45 minutes face-to-face)  [] RE-EVAL     HEP instruction: Written HEP instructions provided and reviewed. GOALS:  Patient stated goal: \"no dizziness\"  [x] Progressing: [] Met: [] Not Met: [] Adjusted    Therapist goals for Patient:   Short Term Goals: To be achieved in: 2 weeks  1. Independent in HEP and progression per patient tolerance, in order to prevent re-injury. [x] Progressing: [] Met: [] Not Met: [] Adjusted  2. Patient will have a decrease in dizziness/imbalance/symptoms by 90%  to facilitate improvement in movement, function, balance and ADLs as indicated by Functional Deficits. [x] Progressing: [] Met: [] Not Met: [] Adjusted    Long Term Goals: To be achieved in: at discharge  1. Disability index score of 12 or less for the [facility] to assist with reaching prior level of function. [x] Progressing: [] Met: [] Not Met: [] Adjusted  2. Patient will have a decrease in dizziness/imbalance/symptoms by 100%  to facilitate improvement in movement, function, balance and ADLs as indicated by Functional Deficits. [x] Progressing: [] Met: [] Not Met: [] Adjusted  3. Patient will demonstrate negative oculomotor special testing/positional testing as indicated by patients Functional Deficits. [x] Progressing: [] Met: [] Not Met: [] Adjusted  4.  Patient will return to functional activities including bed mobility without increased symptoms or restriction. [x] Progressing: [] Met: [] Not Met: [] Adjusted  5. \"ADLs without dizziness\"   [x] Progressing: [] Met: [] Not Met: [] Adjusted     PLAN:   Today's Treatment:    [x] See flowsheet   [] Patient treated with canalith repositioning maneuver   [] Education materials provided on BPPV/Vestibular Dysfunction/Habituation   [] Precautions provided and patient to follow precautions for next 24 hours in regards to BPPV management   [] Written home exercise instructions   [] Other:    Frequency/Duration: 1-2 days per week for 2-4 Weeks:  Interventions:  [x]  Therapeutic exercise including: strength training, ROM, for LEs, cervical spine & UEs  [x]  NMR activation and proprioception for BLEs, vestibular training/habituation, balance, coordination  [x]  Manual therapy as indicated via: canalith repositioning maneuvers, Dry Needling/IASTM, STM, PROM, Gr I-IV mobilizations, manipulation. [x]  Modalities as needed that may include: thermal agents, E-stim, Biofeedback, US, iontophoresis as indicated  [x]  Gait training  [x]  Patient education on BPPV/vestibular function, balance, postural re-education, activity modification, progression of HEP. Electronically signed by:  Mirna Lay, [id number] [id number]    Note: If patient does not return for scheduled/recommended follow up visits, this note will serve as a discharge from care along with the most recent update on progress.

## 2021-09-23 NOTE — FLOWSHEET NOTE
[facility] [place] [facility], [facility]  [address] [facility] [place], [facility]  Phone: [phone]   Fax:     [phone]      Physical Therapy Treatment Note/ Progress Report:   Date:  2021    Patient Name:  Catalina Snider    :  1965  MRN: [id number]    Pertinent Medical History: Additional Pertinent Hx: thyroid, migraine    Medical/Treatment Diagnosis Information:  · Diagnosis: BPPV R ear H81.11  · Treatment Diagnosis: dizziness limiting transfers, mobility, gait    Insurance/Certification information:  PT Insurance Information: [place] - 80 vpcy ( used 8)  Physician Information:  Referring Practitioner: Dr. Raymond [facility] of care signed (Y/N): []  Yes [x]  No     Date of Patient follow up with Physician:      Progress Report: []  Yes  [x]  No     Date Range for reporting period:  Beginnin2021  Ending:     Progress report due (10 Rx/or 30 days whichever is less):      Recertification due (POC duration/ or 90 days whichever is less):      Visit # Insurance Allowable Auth required? Date Range    90 vpcy []  Yes [x]  No      Latex Allergy:  [x]NO      []YES  Preferred Language for Healthcare:   [x]English       []other:    Functional Outcomes Measure:   Date Assessed:  Test:DHI  Score:24    Pain level:  0/10     History of Injury:Patient stated complaint: Pt c/o onset of symptoms in  during a bad sinus infection when she rolled in bed and had vertigo. Pt demonstrated + R Brian White at MD visit on  and MD treated with R Epley that day as well. MD doc minimal change noted after Epley, pt states s/s are somewhat better after that session. SUBJECTIVE:  See eval ** pt arrived 15 min late to eval     OBJECTIVE:   Oculomotor/Vestibular Examination:     Spontaneous nystagmus:       []? Left             []? Right           [x]?  Absent  Gaze-Evoked nystagmus with fixation present:              Primary []? Present       [x]? Absent              Right                []? Present       [x]? Absent              Left                  []? Present       [x]? Absent  VOR Head Thrust Test:          [x]? Normal       []? Abnormal    Comments:   VOR Cancellation:                  [x]? Normal       []? Abnormal    Comments:   Smooth Pursuit:                      [x]? Normal       []? Abnormal    Comments:   Saccades:                               []? Normal       [x]? Abnormal    Comments: mild jerkiness with eye movement vertically  Convergence:                          [x]? Normal       []? Abnormal    Comments:   Static Visual Acuity:                  Dynamic Visual Acuity:     Positional Testing  R Hallpike-Jai maneuver:               Nystagmus:     []? Yes             [x]? No               []? Duration:                                          []? Direction:                  Vertigo:            []? Yes             [x]? No               []? Duration:   L Hallpike-Jai maneuver:              Nystagmus:     []? Yes             [x]? No               []? Duration:                                          []? Direction:                  Vertigo:            []? Yes             [x]? No               []? Duration:   Supine roll head right:              Nystagmus:     []? Yes             [x]? No               []? Duration:                                          []? Direction:                  Vertigo:            []? Yes             [x]? No               []? Duration:   Supine roll head left:              Nystagmus:     []? Yes             [x]? No               []? Duration:                                          []? Direction:                  Vertigo:            []? Yes             [x]?  No               []? Duration:     RESTRICTIONS/PRECAUTIONS:     Exercises/Interventions:     Therapeutic Exercises (42081) Min: Resistance/Reps Notes/Cues                            Therapeutic Activities (19844) Min: Neuromuscular Re-ed (62792) Min:15     Positional testing All negative on eval         Tylor Rodasoff X 5 reps L/R    X 1 viewing  S/s x 30 sec   Up/dwn x 30 sec     Manual Intervention ([id number]) Min:                Modalities  Min:                      Other Therapeutic Activities: Pt was educated on PT POC, Diagnosis, Prognosis, pathomechanics as well as frequency and duration of scheduling future physical therapy appointments. Time was also taken on this day to answer all patient questions and participation in PT. Reviewed appointment policy in detail with patient and patient verbalized understanding. Home Exercise Program: Patient was instructed in the following for HEP:     . Patient verbalized/demonstrated understanding and was issued written handout. 9/23: tylor [place] and x 1 viewing     Therapeutic Exercise and NMR EXR  [x] (96424) Provided verbal/tactile cueing for activities related to strengthening, flexibility, endurance, ROM for improvements in LE, proximal hip, and core control with self care, mobility, lifting, ambulation.  [] (33811) Provided verbal/tactile cueing for activities related to improving balance, coordination, kinesthetic sense, posture, motor skill, proprioception  to assist with LE, proximal hip, and core control in self care, mobility, lifting, ambulation and eccentric single leg control.  [address] and Therapeutic Activities:    [x] (95900 or 42118) Provided verbal/tactile cueing for activities related to improving balance, coordination, kinesthetic sense, posture, motor skill, proprioception and motor activation to allow for proper function of core, proximal hip and LE with self care and ADLs and functional mobility.   [] (68432) Gait Re-education- Provided training and instruction to the patient for proper LE, core and proximal hip recruitment and positioning and eccentric body weight control with ambulation re-education including up and down stairs     Home Exercise Program:    [x] (20116) Reviewed/Progressed HEP activities related to strengthening, flexibility, endurance, ROM of core, proximal hip and LE for functional self-care, mobility, lifting and ambulation/stair navigation   [] (47361)Reviewed/Progressed HEP activities related to improving balance, coordination, kinesthetic sense, posture, motor skill, proprioception of core, proximal hip and LE for self care, mobility, lifting, and ambulation/stair navigation      Manual Treatments:  PROM / STM / Oscillations-Mobs:  G-I, II, III, IV (PA's, Inf., Post.)  [] (76769) Provided manual therapy to mobilize LE, proximal hip and/or LS spine soft tissue/joints for the purpose of modulating pain, promoting relaxation,  increasing ROM, reducing/eliminating soft tissue swelling/inflammation/restriction, improving soft tissue extensibility and allowing for proper ROM for normal function with self care, mobility, lifting and ambulation. Charges:  Timed Code Treatment Minutes: 15   Total Treatment Minutes: 35      [x] EVAL (LOW) 81877 (typically 20 minutes face-to-face)  [] EVAL (MOD) 77896 (typically 30 minutes face-to-face)  [] EVAL (HIGH) 96912 (typically 45 minutes face-to-face)  [] RE-EVAL     [] LV(35947) x     [] Dry needle 1 or 2 Muscles (58458)  [x] NMR (53521) x     [] Dry needle 3+ Muscles (00760)  [] Manual (06153) x     [] Ultrasound (18719) x  [] TA (97801) x     [] Mech Traction (16732)  [] ES(attended) (54512)     [] ES (un) (31410):   [] Vasopump (28129) [] Ionto (47889)   [] Other:    GOALS:  Patient stated goal: \"no dizziness\"  [x]? Progressing: []? Met: []? Not Met: []? Adjusted     Therapist goals for Patient:   Short Term Goals: To be achieved in: 2 weeks  1. Independent in HEP and progression per patient tolerance, in order to prevent re-injury. [x]? Progressing: []? Met: []? Not Met: []? Adjusted  2.  Patient will have a decrease in dizziness/imbalance/symptoms by 90%  to facilitate improvement in movement, function, balance and ADLs as indicated by Functional Deficits. [x]? Progressing: []? Met: []? Not Met: []? Adjusted     Long Term Goals: To be achieved in: at discharge  1. Disability index score of 12 or less for the [facility] to assist with reaching prior level of function. [x]? Progressing: []? Met: []? Not Met: []? Adjusted  2. Patient will have a decrease in dizziness/imbalance/symptoms by 100%  to facilitate improvement in movement, function, balance and ADLs as indicated by Functional Deficits. [x]? Progressing: []? Met: []? Not Met: []? Adjusted  3. Patient will demonstrate negative oculomotor special testing/positional testing as indicated by patients Functional Deficits. [x]? Progressing: []? Met: []? Not Met: []? Adjusted  4. Patient will return to functional activities including bed mobility without increased symptoms or restriction. [x]? Progressing: []? Met: []? Not Met: []? Adjusted  5. \"ADLs without dizziness\"   [x]? Progressing: []? Met: []? Not Met: []? Adjusted              ASSESSMENT:  See eval    Treatment/Activity Tolerance:  [x] Patient tolerated treatment well [] Patient limited by fatique  [] Patient limited by pain  [] Patient limited by other medical complications  [] Other:     Overall Progression Towards Functional goals/ Treatment Progress Update:  [] Patient is progressing as expected towards functional goals listed. [] Progression is slowed due to complexities/Impairments listed. [] Progression has been slowed due to co-morbidities.   [x] Plan just implemented, too soon to assess goals progression <30days   [] Goals require adjustment due to lack of progress  [] Patient is not progressing as expected and requires additional follow up with physician  [] Other    Prognosis for POC: [x] Good [] Fair  [] Poor    Patient requires continued skilled intervention: [x] Yes  [] No        PLAN: Pt to cont with hep and f/u with PT via phone call on Tues 9/28 regarding how she is feeling for another visit or possible D/c if feeling well   [] Continue per plan of care [] Alter current plan (see comments)  [x] Plan of care initiated [] Hold pending MD visit [] Discharge    Electronically signed by: Amber Moya, PT MPT [id number]    Note: If patient does not return for scheduled/recommended follow up visits, this note will serve as a discharge from care along with the most recent update on progress.

## 2021-09-28 ENCOUNTER — APPOINTMENT (OUTPATIENT)
Dept: PHYSICAL THERAPY | Age: 56
End: 2021-09-28
Payer: COMMERCIAL

## 2021-09-28 NOTE — PLAN OF CARE
[place] [facility], [facility]  [address] [place], [facility]  Phone: [phone]   Fax:     [phone]      Physical Therapy Discharge Summary    Dear Dr. Agatha Cardoza,    We had the pleasure of treating the following patient for physical therapy services at [address]. A summary of our findings can be found in the discharge summary below. This includes our final assessment. If you have any questions or concerns regarding these findings, please do not hesitate to contact me at the office phone number checked above. Thank you for the referral.       Physician Signature:________________________________Date:__________________  By signing above, therapists plan is approved by physician          Patient: Radha Mcknight   : 1965   MRN: [id number]  Referring Physician: Referring Practitioner: Dr. Agatha Cardoza      Evaluation Date: 2021        Medical Diagnosis Information:  · Diagnosis: BPPV R ear H81.11  · Treatment Diagnosis: dizziness limiting transfers, mobility, gait  ·       Insurance/Certification information:  [facility]    Date Range of Treatment: one session for al only 21  Total visits:1      Functional Outcomes Measure: at discharge  Test:DHI   Score:0    SUBJECTIVE:  Pt was seen at [facility] noting that her dizziness was significantly better since MD visit. Evaluation was performed and pt was issued Milly Chum habituation exercise and 1 x viewing exercises. Pt f/u with PT on  via telephone call stating she no longer had dizziness and didn't need to return to PT. Will d/c at this time.        OBJECTIVE: Negative positional testing on 21         Response to Treatment:   [x]Patient met all goals and has responded well to treatment and will continue HEP   []Patient should continue to improve in reasonable time if they continue HEP   []Patient has plateaued and is no longer responding to skilled PT intervention    []Patient is getting worse and would benefit from return to referring MD   []Patient unable to adhere to initial POC      GOALS: met      PLAN: d/c     Reason for Discharge:  [x] Goals Met   [] Patient did not return after initial evaluation   [] Progress Plateaued  [] Missed _____ scheduled appointments   [] No insurance coverage [] Patient terminated therapy   [] MD discharged from PT [] Financial Limitations  [] Other:      Electronically signed by:  Abner Haynes, PT  MPT [id number]

## 2021-10-08 RX ORDER — MONTELUKAST SODIUM 10 MG/1
TABLET ORAL
Qty: 30 TABLET | Refills: 0 | Status: SHIPPED | OUTPATIENT
Start: 2021-10-08 | End: 2022-06-23

## 2021-11-16 ENCOUNTER — TELEPHONE (OUTPATIENT)
Dept: FAMILY MEDICINE CLINIC | Age: 56
End: 2021-11-16

## 2021-11-16 DIAGNOSIS — R73.09 ELEVATED GLUCOSE: ICD-10-CM

## 2021-11-16 DIAGNOSIS — E03.9 ACQUIRED HYPOTHYROIDISM: Primary | ICD-10-CM

## 2021-11-16 DIAGNOSIS — E78.49 OTHER HYPERLIPIDEMIA: ICD-10-CM

## 2021-11-16 NOTE — TELEPHONE ENCOUNTER
Alem Olvera MD 7 hours ago (11:48 PM)           It looks like i need to have some tests done i.e. cholesterol, A1C, etc.  Can I have that done before I come to see you?     Shauna Mendes

## 2021-11-22 DIAGNOSIS — E03.9 ACQUIRED HYPOTHYROIDISM: ICD-10-CM

## 2021-11-22 DIAGNOSIS — R73.09 ELEVATED GLUCOSE: ICD-10-CM

## 2021-11-22 DIAGNOSIS — E78.49 OTHER HYPERLIPIDEMIA: ICD-10-CM

## 2021-11-22 LAB
A/G RATIO: 2.3 (ref 1.1–2.2)
ALBUMIN SERPL-MCNC: 4.5 G/DL (ref 3.4–5)
ALP BLD-CCNC: 188 U/L (ref 40–129)
ALT SERPL-CCNC: 25 U/L (ref 10–40)
ANION GAP SERPL CALCULATED.3IONS-SCNC: 12 MMOL/L (ref 3–16)
AST SERPL-CCNC: 20 U/L (ref 15–37)
BILIRUB SERPL-MCNC: 0.6 MG/DL (ref 0–1)
BUN BLDV-MCNC: 9 MG/DL (ref 7–20)
CALCIUM SERPL-MCNC: 9.5 MG/DL (ref 8.3–10.6)
CHLORIDE BLD-SCNC: 103 MMOL/L (ref 99–110)
CHOLESTEROL, TOTAL: 186 MG/DL (ref 0–199)
CO2: 25 MMOL/L (ref 21–32)
CREAT SERPL-MCNC: 0.7 MG/DL (ref 0.6–1.1)
GFR AFRICAN AMERICAN: >60
GFR NON-AFRICAN AMERICAN: >60
GLUCOSE BLD-MCNC: 121 MG/DL (ref 70–99)
HDLC SERPL-MCNC: 45 MG/DL (ref 40–60)
LDL CHOLESTEROL CALCULATED: 106 MG/DL
POTASSIUM SERPL-SCNC: 4.4 MMOL/L (ref 3.5–5.1)
SODIUM BLD-SCNC: 140 MMOL/L (ref 136–145)
TOTAL PROTEIN: 6.5 G/DL (ref 6.4–8.2)
TRIGL SERPL-MCNC: 175 MG/DL (ref 0–150)
TSH SERPL DL<=0.05 MIU/L-ACNC: 2.6 UIU/ML (ref 0.27–4.2)
VLDLC SERPL CALC-MCNC: 35 MG/DL

## 2021-11-23 LAB
ESTIMATED AVERAGE GLUCOSE: 137 MG/DL
HBA1C MFR BLD: 6.4 %

## 2021-11-29 ENCOUNTER — TELEPHONE (OUTPATIENT)
Dept: FAMILY MEDICINE CLINIC | Age: 56
End: 2021-11-29

## 2021-11-29 NOTE — TELEPHONE ENCOUNTER
----- Message from Majo Oakes sent at 11/29/2021  1:43 PM EST -----  Subject: Message to Provider    QUESTIONS  Information for Provider? PATIENT CALLED IN WITH REGARDS OF APPOINTMENT   11/30/2021 @10:15AM, PATIENT STATED SHE'S BEEN EXPOSE TO COVID 19 NO   SYMPTOMS COULD SHE ATTEND APPOINTMENT  ---------------------------------------------------------------------------  --------------  CALL BACK INFO  What is the best way for the office to contact you? OK to leave message on   voicemail  Preferred Call Back Phone Number?  [phone]  ---------------------------------------------------------------------------  --------------  SCRIPT ANSWERS  undefined

## 2021-12-07 ENCOUNTER — OFFICE VISIT (OUTPATIENT)
Dept: FAMILY MEDICINE CLINIC | Age: 56
End: 2021-12-07
Payer: COMMERCIAL

## 2021-12-07 VITALS
DIASTOLIC BLOOD PRESSURE: 80 MMHG | TEMPERATURE: 97 F | SYSTOLIC BLOOD PRESSURE: 112 MMHG | WEIGHT: 190 LBS | HEART RATE: 80 BPM | BODY MASS INDEX: 29.82 KG/M2 | HEIGHT: 67 IN

## 2021-12-07 DIAGNOSIS — E78.2 MIXED HYPERLIPIDEMIA: ICD-10-CM

## 2021-12-07 DIAGNOSIS — R73.03 PREDIABETES: ICD-10-CM

## 2021-12-07 DIAGNOSIS — Z00.00 WELL ADULT EXAM: Primary | ICD-10-CM

## 2021-12-07 PROCEDURE — G8484 FLU IMMUNIZE NO ADMIN: HCPCS | Performed by: FAMILY MEDICINE

## 2021-12-07 PROCEDURE — 99396 PREV VISIT EST AGE 40-64: CPT | Performed by: FAMILY MEDICINE

## 2021-12-07 ASSESSMENT — ENCOUNTER SYMPTOMS
CONSTIPATION: 0
DIARRHEA: 0
CHEST TIGHTNESS: 0
ABDOMINAL PAIN: 0
SHORTNESS OF BREATH: 0
BLOOD IN STOOL: 0
ABDOMINAL DISTENTION: 0
TROUBLE SWALLOWING: 0
NAUSEA: 0
VOMITING: 0

## 2021-12-07 NOTE — PATIENT INSTRUCTIONS
Do watch the diet  Cut the crestor in half  Check the blood fasting in 4 months   See me in late April

## 2021-12-07 NOTE — PROGRESS NOTES
Subjective:      Patient ID: Josi Espinoza is a 60 y.o. female. Chief Complaint   Patient presents with    Annual Exam     discuss lab results        Patient presents with:   Annual Exam: discuss lab results    Here for the annual pe    She is well  Seeing ortho for the neck  Family is well     YOB: 1965    Date of Visit:  12/7/2021     -- Methylprednisolone -- Hives   -- Niaspan (Niacin Er) -- Hives   -- Percocet (Oxycodone-Acetaminophen) -- Other (See Comments)    --  Dropped her blood pressure to low   -- Skelaxin (Metaxalone) -- Hives   -- Sulfamethoxazole-Trimethoprim -- Hives   -- Ciprofloxacin -- Rash   -- Omnicef -- Rash    Current Outpatient Medications:  rosuvastatin (CRESTOR) 10 MG tablet, TAKE 1 TABLET BY MOUTH DAILY, Disp: 90 tablet, Rfl: 0  montelukast (SINGULAIR) 10 MG tablet, TAKE 1 TABLET BY MOUTH EVERY NIGHT, Disp: 30 tablet, Rfl: 0  azelastine (ASTELIN) 0.1 % nasal spray, 1 spray by Nasal route 2 times daily Use in each nostril as directed, Disp: 60 mL, Rfl: 1  levothyroxine (SYNTHROID) 100 MCG tablet, TAKE 1 TABLET BY MOUTH DAILY, Disp: 90 tablet, Rfl: 1  vitamin D (ERGOCALCIFEROL) 1.25 MG (01741 UT) CAPS capsule, Take 1 capsule by mouth every 30 days, Disp: 4 capsule, Rfl: 5  Omega-3 Fatty Acids (RA FISH OIL) 1400 MG CPDR, Take by mouth, Disp: , Rfl:    metoprolol tartrate (LOPRESSOR) 50 MG tablet, TAKE 1 TABLET BY MOUTH TWICE DAILY (Patient taking differently: Take 50 mg by mouth daily ), Disp: 180 tablet, Rfl: 1  VITAMIN E BLEND PO, Take by mouth, Disp: , Rfl:   triamcinolone (NASACORT ALLERGY 24HR) 55 MCG/ACT nasal inhaler, 2 sprays by Nasal route daily, Disp: , Rfl:     No current facility-administered medications for this visit.      --------------------------               12/07/21                     1505        --------------------------   BP:          112/80        Site:    Left Upper Arm    Position:    Sitting        Cuff Size:  Medium Adult Pulse:         80          Temp:   97 °F (36.1 °C)    TempSrc:    Temporal       Weight: 190 lb (86.2 kg)   Height: 5' 7\" (1.702 m)   --------------------------  Body mass index is 29.76 kg/m². Wt Readings from Last 3 Encounters:  12/07/21 : 190 lb (86.2 kg)  09/22/21 : 188 lb (85.3 kg)  09/16/21 : 189 lb (85.7 kg)    BP Readings from Last 3 Encounters:  12/07/21 : 112/80  09/22/21 : 139/73  09/16/21 : 127/79                Review of Systems   Constitutional: Negative for appetite change, chills, fever and unexpected weight change. HENT: Negative for hearing loss, tinnitus and trouble swallowing. May have occ ringing in ears sporadic and goes away    Respiratory: Negative for chest tightness and shortness of breath. Cardiovascular: Negative for chest pain, palpitations and leg swelling. Gastrointestinal: Negative for abdominal distention, abdominal pain, blood in stool, constipation, diarrhea, nausea and vomiting. No gerd no dysphagia    Genitourinary: Negative for difficulty urinating, dysuria and hematuria. Neurological: Negative for headaches. Objective:   Physical Exam  Constitutional:       General: She is not in acute distress. Appearance: Normal appearance. She is well-developed. She is not ill-appearing or diaphoretic. HENT:      Head: Normocephalic and atraumatic. Right Ear: Tympanic membrane and ear canal normal.      Left Ear: Tympanic membrane and ear canal normal.      Nose: Nose normal.      Mouth/Throat:      Lips: Pink. Mouth: Mucous membranes are moist. No oral lesions. Pharynx: Oropharynx is clear. Uvula midline. Eyes:      General: No scleral icterus. Neck:      Thyroid: No thyroid mass or thyromegaly. Cardiovascular:      Rate and Rhythm: Normal rate and regular rhythm. Heart sounds: Normal heart sounds. No murmur heard. No friction rub. No gallop.        Comments:     Pulmonary:      Effort: Pulmonary effort is normal. No tachypnea, accessory muscle usage or respiratory distress. Breath sounds: Normal breath sounds. No decreased breath sounds, wheezing, rhonchi or rales. Chest:   Breasts:      Right: No supraclavicular adenopathy. Left: No supraclavicular adenopathy. Abdominal:      General: Bowel sounds are normal. There is no distension or abdominal bruit. Palpations: Abdomen is soft. There is no hepatomegaly, splenomegaly, mass or pulsatile mass. Tenderness: There is no abdominal tenderness. There is no guarding. Musculoskeletal:      Cervical back: Neck supple. Lymphadenopathy:      Head:      Right side of head: No submental, submandibular, preauricular or posterior auricular adenopathy. Left side of head: No submental, submandibular, preauricular or posterior auricular adenopathy. Cervical: No cervical adenopathy. Upper Body:      Right upper body: No supraclavicular adenopathy. Left upper body: No supraclavicular adenopathy. Skin:     General: Skin is warm and dry. Coloration: Skin is not pale. Nails: There is no clubbing. Neurological:      General: No focal deficit present. Mental Status: She is alert. Assessment:        Diagnosis Orders   1. Well adult exam     2. Mixed hyperlipidemia  Lipid Panel   3.  Prediabetes  Hemoglobin A1C    Glucose       She declined the flu shot  She tells me she did see dr Keri Yap and also the gi for the liver testing  She tells me the gi told her to lose weight   Discussed risk of the statin use and the glucose  She has not been abler to walk regular due to stress fratucre on the left foot  Overall stable  I will have her direct to diet and try to stay active  Reduce the statin dose to one half      Plan:      Do watch the diet  Cut the crestor in half  Check the blood fasting in 4 months   See me in late April         Brittany Gibson MD

## 2021-12-10 ENCOUNTER — TELEPHONE (OUTPATIENT)
Dept: FAMILY MEDICINE CLINIC | Age: 56
End: 2021-12-10

## 2021-12-10 DIAGNOSIS — J06.9 ACUTE UPPER RESPIRATORY INFECTION, UNSPECIFIED: Primary | ICD-10-CM

## 2021-12-10 RX ORDER — AZITHROMYCIN 250 MG/1
TABLET, FILM COATED ORAL
Qty: 1 PACKET | Refills: 0 | Status: SHIPPED | OUTPATIENT
Start: 2021-12-10 | End: 2021-12-20

## 2021-12-10 NOTE — TELEPHONE ENCOUNTER
----- Message from Shagyg Alaniz sent at 12/10/2021  8:02 AM EST -----  Subject: Message to Provider    QUESTIONS  Information for Provider? Pt calling with symptoms of pressure in head and   ears, stuffy, runny nose, sore throat. Started with ear pain 4 days ago,   the rest started yesterday (12/9). Would like to see if she can have   antibiotics sent in for her.   ---------------------------------------------------------------------------  --------------  CALL BACK INFO  What is the best way for the office to contact you? OK to leave message on   voicemail, OK to respond with electronic message via [employer] portal (only   for patients who have registered [employer] account)  Preferred Call Back Phone Number? [phone]  ---------------------------------------------------------------------------  --------------  SCRIPT ANSWERS  Relationship to Patient?  Self

## 2021-12-15 ENCOUNTER — CLINICAL DOCUMENTATION (OUTPATIENT)
Dept: OTHER | Age: 56
End: 2021-12-15

## 2021-12-27 ENCOUNTER — OFFICE VISIT (OUTPATIENT)
Dept: ENT CLINIC | Age: 56
End: 2021-12-27
Payer: COMMERCIAL

## 2021-12-27 VITALS
HEART RATE: 88 BPM | SYSTOLIC BLOOD PRESSURE: 136 MMHG | BODY MASS INDEX: 29.91 KG/M2 | WEIGHT: 191 LBS | DIASTOLIC BLOOD PRESSURE: 72 MMHG

## 2021-12-27 DIAGNOSIS — H81.11 BENIGN PAROXYSMAL POSITIONAL VERTIGO OF RIGHT EAR: ICD-10-CM

## 2021-12-27 DIAGNOSIS — J06.9 UPPER RESPIRATORY TRACT INFECTION, UNSPECIFIED TYPE: Primary | ICD-10-CM

## 2021-12-27 DIAGNOSIS — J30.9 ALLERGIC RHINITIS, UNSPECIFIED SEASONALITY, UNSPECIFIED TRIGGER: ICD-10-CM

## 2021-12-27 PROCEDURE — 3017F COLORECTAL CA SCREEN DOC REV: CPT | Performed by: OTOLARYNGOLOGY

## 2021-12-27 PROCEDURE — 1036F TOBACCO NON-USER: CPT | Performed by: OTOLARYNGOLOGY

## 2021-12-27 PROCEDURE — G8484 FLU IMMUNIZE NO ADMIN: HCPCS | Performed by: OTOLARYNGOLOGY

## 2021-12-27 PROCEDURE — G8427 DOCREV CUR MEDS BY ELIG CLIN: HCPCS | Performed by: OTOLARYNGOLOGY

## 2021-12-27 PROCEDURE — G8417 CALC BMI ABV UP PARAM F/U: HCPCS | Performed by: OTOLARYNGOLOGY

## 2021-12-27 PROCEDURE — 99213 OFFICE O/P EST LOW 20 MIN: CPT | Performed by: OTOLARYNGOLOGY

## 2021-12-27 NOTE — PROGRESS NOTES
[address] [facility]  Follow up      Patient Name: [address] [place] [address] Record Number:  <[id number]  Primary Care Physician:  Yi Machuca MD  Date of Consultation: 12/27/2021    Chief Complaint: Ear and facial pressure        Interval History    Patient presents for evaluation of at least 2 weeks of ear and facial pressure. She thinks perhaps has been present a little bit longer than that. Was given antibiotics which she thinks helped a little bit. Does not have any true hearing loss. No significant purulent drainage. She is still using a nasal steroid and Astelin. She got tested for Covid after Thanksgiving, but not more recently    She did have BPPV. She went to physical therapy. She is no longer getting dizzy. REVIEW OF SYSTEMS  As above    PHYSICAL EXAM  GENERAL: No Acute Distress, Alert and Oriented, no Hoarseness, strong voice  EYES: EOMI, Anti-icteric  HENT:   Head: Normocephalic and atraumatic. Face:  Symmetric, facial nerve intact, no sinus tenderness  Right Ear: Normal external ear, normal external auditory canal, intact tympanic membrane with normal mobility and aerated middle ear  Left Ear: Normal external ear, normal external auditory canal, intact tympanic membrane with normal mobility and aerated middle ear  Mouth/Oral Cavity:  normal lips, Uvula is midline,  Oropharynx/Larynx:  normal oropharynx,  Nose:Normal external nasal appearance. Anterior rhinoscopy shows no polyps or purulent drainage  NECK: Normal range of motion, no thyromegaly, trachea is midline, no lymphadenopathy, no neck masses, no crepitus        ASSESSMENT/PLAN  1. Upper respiratory tract infection, unspecified type  I feel as though her symptoms are likely just a lingering upper respiratory infection. She probably has some mild eustachian tube dysfunction that is causing the vague sensation in her ears. I told her to try Sudafed for about 3 days.   We did discuss using another antibiotic. I do not see any clear evidence of a bacterial infection such as middle ear fluid or any purulent drainage from the nose. Offered to give her another prescription since reportedly the azithromycin helped, but she agreed to hold off. I told her to call if she would like for me to call her in 1  2. Benign paroxysmal positional vertigo of right ear  Resolved    3. Allergic rhinitis, unspecified seasonality, unspecified trigger  Continue the Nasacort and Astelin             I have performed a head and neck physical exam personally or was physically present during the key or critical portions of the service. This note was generated completely or in part utilizing Dragon dictation speech recognition software. Occasionally, words are mistranscribed and despite editing, the text may contain inaccuracies due to incorrect word recognition. If further clarification is needed please contact the office at [phone].

## 2022-01-31 RX ORDER — AZELASTINE 1 MG/ML
SPRAY, METERED NASAL
Qty: 60 ML | Refills: 1 | Status: SHIPPED | OUTPATIENT
Start: 2022-01-31

## 2022-03-31 RX ORDER — METOPROLOL TARTRATE 50 MG/1
50 TABLET, FILM COATED ORAL DAILY
Qty: 90 TABLET | Refills: 1 | Status: SHIPPED | OUTPATIENT
Start: 2022-03-31 | End: 2022-04-07 | Stop reason: ALTCHOICE

## 2022-04-14 DIAGNOSIS — R73.03 PREDIABETES: ICD-10-CM

## 2022-04-14 DIAGNOSIS — E78.2 MIXED HYPERLIPIDEMIA: ICD-10-CM

## 2022-04-14 LAB
CHOLESTEROL, TOTAL: 245 MG/DL (ref 0–199)
GLUCOSE FASTING: 162 MG/DL (ref 70–99)
HDLC SERPL-MCNC: 46 MG/DL (ref 40–60)
LDL CHOLESTEROL CALCULATED: 147 MG/DL
TRIGL SERPL-MCNC: 258 MG/DL (ref 0–150)
VLDLC SERPL CALC-MCNC: 52 MG/DL

## 2022-04-15 LAB
ESTIMATED AVERAGE GLUCOSE: 131.2 MG/DL
HBA1C MFR BLD: 6.2 %

## 2022-04-19 ENCOUNTER — OFFICE VISIT (OUTPATIENT)
Dept: FAMILY MEDICINE CLINIC | Age: 57
End: 2022-04-19
Payer: COMMERCIAL

## 2022-04-19 VITALS
HEART RATE: 80 BPM | DIASTOLIC BLOOD PRESSURE: 80 MMHG | BODY MASS INDEX: 29.82 KG/M2 | SYSTOLIC BLOOD PRESSURE: 134 MMHG | HEIGHT: 67 IN | TEMPERATURE: 97 F | WEIGHT: 190 LBS

## 2022-04-19 DIAGNOSIS — E03.9 ACQUIRED HYPOTHYROIDISM: ICD-10-CM

## 2022-04-19 DIAGNOSIS — E78.2 MIXED HYPERLIPIDEMIA: Primary | ICD-10-CM

## 2022-04-19 DIAGNOSIS — R73.09 ELEVATED GLUCOSE: ICD-10-CM

## 2022-04-19 PROCEDURE — 3017F COLORECTAL CA SCREEN DOC REV: CPT | Performed by: FAMILY MEDICINE

## 2022-04-19 PROCEDURE — 1036F TOBACCO NON-USER: CPT | Performed by: FAMILY MEDICINE

## 2022-04-19 PROCEDURE — 99214 OFFICE O/P EST MOD 30 MIN: CPT | Performed by: FAMILY MEDICINE

## 2022-04-19 PROCEDURE — G8427 DOCREV CUR MEDS BY ELIG CLIN: HCPCS | Performed by: FAMILY MEDICINE

## 2022-04-19 PROCEDURE — G8417 CALC BMI ABV UP PARAM F/U: HCPCS | Performed by: FAMILY MEDICINE

## 2022-04-19 RX ORDER — ROSUVASTATIN CALCIUM 10 MG/1
10 TABLET, COATED ORAL DAILY
Qty: 90 TABLET | Refills: 2 | Status: SHIPPED | OUTPATIENT
Start: 2022-04-19

## 2022-04-19 NOTE — PROGRESS NOTES
Subjective:      Patient ID: Danelle Messer is a 60 y.o. female. Chief Complaint   Patient presents with    Follow-up     hypertension, lipids        Patient presents with:   Follow-up: hypertension, lipids    She is well overall  She has no c/o  She did see the renal medicine and meds changed     She does exercise  meds the same    YOB: 1965    Date of Visit:  4/19/2022     -- Methylprednisolone -- Hives   -- Niaspan (Niacin Er) -- Hives   -- Percocet (Oxycodone-Acetaminophen) -- Other (See Comments)    --  Dropped her blood pressure to low   -- Skelaxin (Metaxalone) -- Hives   -- Sulfamethoxazole-Trimethoprim -- Hives   -- Ciprofloxacin -- Rash   -- Omnicef -- Rash    Current Outpatient Medications:  loratadine (CLARITIN) 10 MG tablet, Take 10 mg by mouth daily, Disp: , Rfl:    chlorthalidone (HYGROTON) 25 MG tablet, Take 1 tablet by mouth daily, Disp: 30 tablet, Rfl: 3  azelastine (ASTELIN) 0.1 % nasal spray, USE 1 SPRAY IN EACH NOSTRIL TWICE DAILY AS DIRECTED, Disp: 60 mL, Rfl: 1  levothyroxine (SYNTHROID) 100 MCG tablet, TAKE 1 TABLET BY MOUTH DAILY, Disp: 90 tablet, Rfl: 1  rosuvastatin (CRESTOR) 10 MG tablet, TAKE 1 TABLET BY MOUTH DAILY, Disp: 90 tablet, Rfl: 0  montelukast (SINGULAIR) 10 MG tablet, TAKE 1 TABLET BY MOUTH EVERY NIGHT, Disp: 30 tablet, Rfl: 0  vitamin D (ERGOCALCIFEROL) 1.25 MG (85795 UT) CAPS capsule, Take 1 capsule by mouth every 30 days, Disp: 4 capsule, Rfl: 5  Omega-3 Fatty Acids (RA FISH OIL) 1400 MG CPDR, Take by mouth, Disp: , Rfl:    VITAMIN E BLEND PO, Take by mouth, Disp: , Rfl:   triamcinolone (NASACORT ALLERGY 24HR) 55 MCG/ACT nasal inhaler, 2 sprays by Nasal route daily, Disp: , Rfl:     No current facility-administered medications for this visit.      --------------------------               04/19/22                     1058        --------------------------   BP:          134/80        Site:    Left Upper Arm    Position:    Sitting Cuff Size:  Medium Adult     Temp:   97 °F (36.1 °C)    TempSrc:    Temporal       Weight: 190 lb (86.2 kg)   Height: 5' 7\" (1.702 m)   --------------------------  Body mass index is 29.76 kg/m². Wt Readings from Last 3 Encounters:  04/19/22 : 190 lb (86.2 kg)  04/07/22 : 193 lb (87.5 kg)  12/27/21 : 191 lb (86.6 kg)    BP Readings from Last 3 Encounters:  04/19/22 : 134/80  04/07/22 : 127/79  12/27/21 : 136/72              Review of Systems    Objective:   Physical Exam  Constitutional:       General: She is not in acute distress. Appearance: Normal appearance. She is well-developed. She is not ill-appearing or diaphoretic. Cardiovascular:      Rate and Rhythm: Normal rate and regular rhythm. Heart sounds: Normal heart sounds. No murmur heard. No friction rub. No gallop. Comments: No edema legs  Pulmonary:      Effort: Pulmonary effort is normal. No tachypnea, accessory muscle usage or respiratory distress. Breath sounds: Normal breath sounds. No decreased breath sounds, wheezing, rhonchi or rales. Chest:   Breasts:      Right: No supraclavicular adenopathy. Left: No supraclavicular adenopathy. Lymphadenopathy:      Cervical: No cervical adenopathy. Upper Body:      Right upper body: No supraclavicular adenopathy. Left upper body: No supraclavicular adenopathy. Skin:     General: Skin is warm and dry. Coloration: Skin is not pale. Neurological:      Mental Status: She is alert. Assessment:        Diagnosis Orders   1. Mixed hyperlipidemia  Comprehensive Metabolic Panel    Lipid Panel    TSH   2. Elevated glucose  Comprehensive Metabolic Panel    Hemoglobin A1C   3.  Acquired hypothyroidism  TSH     Orders Placed This Encounter   Medications    rosuvastatin (CRESTOR) 10 MG tablet     Sig: Take 1 tablet by mouth daily     Dispense:  90 tablet     Refill:  2       With the following hx we are concerned and will need to stay on larger dose of the lipid med  She will continue all else the same  She is adopted but she tells me her (biologic) father did die age 46 with MI  Mother with dm      Plan:      Continue the diet and exercise  Use one full tablet of the crestor  Get fasting blood in august  See me later this year for your annual physical         Georges Truong MD

## 2022-06-08 ENCOUNTER — HOSPITAL ENCOUNTER (OUTPATIENT)
Dept: GENERAL RADIOLOGY | Age: 57
Discharge: HOME | End: 2022-06-08
Payer: COMMERCIAL

## 2022-06-08 ENCOUNTER — HOSPITAL ENCOUNTER (OUTPATIENT)
Age: 57
Discharge: HOME | End: 2022-06-08
Payer: COMMERCIAL

## 2022-06-08 DIAGNOSIS — Z87.440 HISTORY OF URINARY TRACT INFECTION: ICD-10-CM

## 2022-06-08 DIAGNOSIS — Z87.442 PERSONAL HISTORY OF URINARY CALCULI: ICD-10-CM

## 2022-06-08 DIAGNOSIS — N20.0 CALCULUS OF KIDNEY: ICD-10-CM

## 2022-06-08 DIAGNOSIS — R30.0 DYSURIA: ICD-10-CM

## 2022-06-08 DIAGNOSIS — N20.1 CALCULUS OF URETER: ICD-10-CM

## 2022-06-08 PROCEDURE — 74018 RADEX ABDOMEN 1 VIEW: CPT

## 2022-06-23 ENCOUNTER — OFFICE VISIT (OUTPATIENT)
Dept: FAMILY MEDICINE CLINIC | Age: 57
End: 2022-06-23
Payer: COMMERCIAL

## 2022-06-23 VITALS
BODY MASS INDEX: 28.41 KG/M2 | HEART RATE: 88 BPM | HEIGHT: 67 IN | DIASTOLIC BLOOD PRESSURE: 76 MMHG | SYSTOLIC BLOOD PRESSURE: 118 MMHG | TEMPERATURE: 98.4 F | WEIGHT: 181 LBS

## 2022-06-23 DIAGNOSIS — E03.9 ACQUIRED HYPOTHYROIDISM: ICD-10-CM

## 2022-06-23 DIAGNOSIS — I10 ESSENTIAL HYPERTENSION: ICD-10-CM

## 2022-06-23 DIAGNOSIS — M54.2 NECK PAIN: ICD-10-CM

## 2022-06-23 DIAGNOSIS — Z01.818 PREOP EXAMINATION: Primary | ICD-10-CM

## 2022-06-23 DIAGNOSIS — E78.2 MIXED HYPERLIPIDEMIA: ICD-10-CM

## 2022-06-23 PROCEDURE — G8427 DOCREV CUR MEDS BY ELIG CLIN: HCPCS | Performed by: FAMILY MEDICINE

## 2022-06-23 PROCEDURE — 99212 OFFICE O/P EST SF 10 MIN: CPT | Performed by: FAMILY MEDICINE

## 2022-06-23 PROCEDURE — G8417 CALC BMI ABV UP PARAM F/U: HCPCS | Performed by: FAMILY MEDICINE

## 2022-06-23 ASSESSMENT — PATIENT HEALTH QUESTIONNAIRE - PHQ9
SUM OF ALL RESPONSES TO PHQ QUESTIONS 1-9: 0
2. FEELING DOWN, DEPRESSED OR HOPELESS: 0
SUM OF ALL RESPONSES TO PHQ QUESTIONS 1-9: 0
1. LITTLE INTEREST OR PLEASURE IN DOING THINGS: 0
SUM OF ALL RESPONSES TO PHQ QUESTIONS 1-9: 0
SUM OF ALL RESPONSES TO PHQ9 QUESTIONS 1 & 2: 0
SUM OF ALL RESPONSES TO PHQ QUESTIONS 1-9: 0

## 2022-06-23 ASSESSMENT — ENCOUNTER SYMPTOMS
ABDOMINAL PAIN: 0
TROUBLE SWALLOWING: 0
VOICE CHANGE: 0
COUGH: 0
EYE PAIN: 0
SORE THROAT: 0
NAUSEA: 0
BACK PAIN: 0
BLOOD IN STOOL: 0
CHEST TIGHTNESS: 0
CONSTIPATION: 0
DIARRHEA: 0
SHORTNESS OF BREATH: 0
VOMITING: 0
ABDOMINAL DISTENTION: 0

## 2022-06-23 NOTE — PROGRESS NOTES
Subjective:      Patient ID: Hyacinth Harada is a 60 y.o. female. Chief Complaint   Patient presents with    Pre-op Exam     Neck Surgery on 7-6-2022 by Dr. Lavelle Najera at [address]         Patient presents with:  Pre-op Exam: Neck Surgery on 7-6-2022 by Dr. Lavelle Najera at [address].     Here for the above  She is well and no c/o  For neck pain that radiates down the right arm     height is 5' 7\" (1.702 m) and weight is 181 lb (82.1 kg). Her temporal temperature is 98.4 °F (36.9 °C). Her blood pressure is 118/76 and her pulse is 88. Allergies:   -- Methylprednisolone -- Hives   -- Niaspan (Niacin Er) -- Hives   -- Percocet (Oxycodone-Acetaminophen) -- Other (See Comments)    --  Dropped her blood pressure to low   -- Skelaxin (Metaxalone) -- Hives   -- Sulfamethoxazole-Trimethoprim -- Hives   -- Ciprofloxacin -- Rash   -- Omnicef -- Rash    No tobacco history. ETOH one drink per week     Current Outpatient Medications:     chlorthalidone (HYGROTON) 25 MG tablet, Take 1 tablet by mouth daily,    rosuvastatin (CRESTOR) 10 MG tablet, Take 1 tablet by mouth daily,    loratadine (CLARITIN) 10 MG tablet, Take 10 mg by mouth daily    azelastine (ASTELIN) 0.1 % nasal spray, USE 1 SPRAY IN EACH NOSTRIL TWICE DAILY AS DIRECTED,     levothyroxine (SYNTHROID) 100 MCG tablet, TAKE 1 TABLET BY MOUTH DAILY,    vitamin D (ERGOCALCIFEROL) 1.25 MG (19121 UT) CAPS capsule, Take 1 capsule by mouth every 30 days,     Omega-3 Fatty Acids (RA FISH OIL) 1400 MG CPDR, Take by mouth    VITAMIN E BLEND PO, Take by mouth,    triamcinolone (NASACORT ALLERGY 24HR) 55 MCG/ACT nasal inhaler, 2 sprays by Nasal route daily      Past Surgical History:  11/26/2013: COLONOSCOPY      Comment:  normal dr Debra Bueno, hemorrhoid and diverticula  02/05/2019: COLONOSCOPY      Comment:  dr Gini Kanner, small polyp, repeat 5 years  No date: CYSTOURETHROSCOPY/STENT REMOVAL  08/31/2012: HYSTERECTOMY (CERVIX STATUS UNKNOWN)      Comment:  ovaries in place.  no cancer  No date: KIDNEY SURGERY  No date: KNEE ARTHROSCOPY      Comment:  RIGHT  No date: LITHOTRIPSY  06/02/2020: MOHS SURGERY  08/2013: SPINE SURGERY      Comment:  lower back microdiscectomy   11/26/2013: UPPER GASTROINTESTINAL ENDOSCOPY      Comment:  normal dr Britt Steve. gastritis  No date: URETER STENT PLACEMENT  09/26/2017: URETEROSCOPY    No problems with anesthesia       Immunization History  Administered            Date(s) Administered    COVID-19, Pfizer Purple top, DILUTE for use, 12+ yrs, 30mcg/0.3mL dose                          04/08/2021 04/28/2021 12/29/2021      Tdap (Boostrix, Adacel)                          07/30/2007  10/10/2017      Zoster Recombinant (Shingrix)                          07/09/2019  10/09/2019      Past Medical History:  No date: AR (allergic rhinitis)  No date: Hyperlipidemia  No date: Impaired fasting glucose  No date: Migraine  No date: Reflux gastritis  No date: Thyroid disease      Comment:  hypothyroidism            Review of Systems   Constitutional: Negative for appetite change, chills, fever and unexpected weight change. HENT: Negative for sore throat, trouble swallowing and voice change. No loose teeth no front teeth with cap/crown    Eyes: Negative for pain and visual disturbance. Respiratory: Negative for cough, chest tightness and shortness of breath. Cardiovascular: Negative for chest pain, palpitations and leg swelling. No hx of heart disease. She can exercise normally no cp no sob can carry items up stairs no cp no sob    Gastrointestinal: Negative for abdominal distention, abdominal pain, blood in stool, constipation, diarrhea, nausea and vomiting. No gerd no dysphagia no hx of hepatitis    Genitourinary: Negative for difficulty urinating, dysuria and hematuria. Musculoskeletal: Negative for back pain. Neck pain into the right arm    Skin: Negative for rash.    Neurological: Negative for dizziness, seizures, syncope and headaches. Hematological: Negative for adenopathy. Does not bruise/bleed easily. No hx of blood clot        Objective:   Physical Exam  Constitutional:       General: She is not in acute distress. Appearance: Normal appearance. She is well-developed. She is not ill-appearing or diaphoretic. HENT:      Head: Normocephalic and atraumatic. Right Ear: Tympanic membrane and ear canal normal.      Left Ear: Tympanic membrane and ear canal normal.      Nose: Nose normal.      Mouth/Throat:      Lips: Pink. Mouth: Mucous membranes are moist. No oral lesions. Pharynx: Oropharynx is clear. Uvula midline. Eyes:      General: No scleral icterus. Neck:      Thyroid: No thyroid mass or thyromegaly. Cardiovascular:      Rate and Rhythm: Normal rate and regular rhythm. Heart sounds: Normal heart sounds. No murmur heard. No friction rub. No gallop. Comments:     Pulmonary:      Effort: Pulmonary effort is normal. No tachypnea, accessory muscle usage or respiratory distress. Breath sounds: Normal breath sounds. No decreased breath sounds, wheezing, rhonchi or rales. Chest:   Breasts:      Right: No supraclavicular adenopathy. Left: No supraclavicular adenopathy. Abdominal:      General: Bowel sounds are normal. There is no distension or abdominal bruit. Palpations: Abdomen is soft. There is no hepatomegaly, splenomegaly, mass or pulsatile mass. Tenderness: There is no abdominal tenderness. There is no guarding. Musculoskeletal:      Cervical back: Neck supple. Lymphadenopathy:      Head:      Right side of head: No submental, submandibular, preauricular or posterior auricular adenopathy. Left side of head: No submental, submandibular, preauricular or posterior auricular adenopathy. Cervical: No cervical adenopathy. Upper Body:      Right upper body: No supraclavicular adenopathy. Left upper body: No supraclavicular adenopathy.    Skin: General: Skin is warm and dry. Coloration: Skin is not pale. Nails: There is no clubbing. Neurological:      General: No focal deficit present. Mental Status: She is alert. Assessment:        Diagnosis Orders   1. Preop examination  EKG 12 Lead    Basic Metabolic Panel   2. Neck pain     3. Acquired hypothyroidism     4. Essential hypertension  EKG 12 Lead    Basic Metabolic Panel   5.  Mixed hyperlipidemia         Ok for the procedure  She tells me she has preop labs being done on Monday   Will add additional   She declines flu shot        Plan:      Do not take advil aleve ibuprofen or similar for a week before the surgery  Stop the vitamin E and fish oil a week before the surgery  You may take the thyroid medicine with just enough water to get the pill down as soon as you wake up on the morning of the surgery   See us in October         Michael Downs MD

## 2022-06-23 NOTE — PATIENT INSTRUCTIONS
Do not take advil aleve ibuprofen or similar for a week before the surgery  Stop the vitamin E and fish oil a week before the surgery  You may take the thyroid medicine with just enough water to get the pill down as soon as you wake up on the morning of the surgery   See us in October

## 2022-06-27 RX ORDER — LEVOTHYROXINE SODIUM 0.1 MG/1
100 TABLET ORAL DAILY
Qty: 90 TABLET | Refills: 1 | Status: SHIPPED | OUTPATIENT
Start: 2022-06-27

## 2022-08-05 ENCOUNTER — OFFICE VISIT (OUTPATIENT)
Dept: FAMILY MEDICINE CLINIC | Age: 57
End: 2022-08-05
Payer: COMMERCIAL

## 2022-08-05 VITALS
DIASTOLIC BLOOD PRESSURE: 86 MMHG | SYSTOLIC BLOOD PRESSURE: 128 MMHG | HEIGHT: 67 IN | TEMPERATURE: 97.2 F | BODY MASS INDEX: 28.56 KG/M2 | WEIGHT: 182 LBS | HEART RATE: 88 BPM

## 2022-08-05 DIAGNOSIS — R42 VERTIGO: Primary | ICD-10-CM

## 2022-08-05 PROCEDURE — G8417 CALC BMI ABV UP PARAM F/U: HCPCS | Performed by: FAMILY MEDICINE

## 2022-08-05 PROCEDURE — 3017F COLORECTAL CA SCREEN DOC REV: CPT | Performed by: FAMILY MEDICINE

## 2022-08-05 PROCEDURE — G8427 DOCREV CUR MEDS BY ELIG CLIN: HCPCS | Performed by: FAMILY MEDICINE

## 2022-08-05 PROCEDURE — 1036F TOBACCO NON-USER: CPT | Performed by: FAMILY MEDICINE

## 2022-08-05 PROCEDURE — 99213 OFFICE O/P EST LOW 20 MIN: CPT | Performed by: FAMILY MEDICINE

## 2022-08-05 RX ORDER — MECLIZINE HYDROCHLORIDE 25 MG/1
25 TABLET ORAL 3 TIMES DAILY PRN
Qty: 15 TABLET | Refills: 0 | Status: SHIPPED | OUTPATIENT
Start: 2022-08-05 | End: 2022-11-02

## 2022-08-05 SDOH — ECONOMIC STABILITY: FOOD INSECURITY: WITHIN THE PAST 12 MONTHS, THE FOOD YOU BOUGHT JUST DIDN'T LAST AND YOU DIDN'T HAVE MONEY TO GET MORE.: NEVER TRUE

## 2022-08-05 SDOH — ECONOMIC STABILITY: FOOD INSECURITY: WITHIN THE PAST 12 MONTHS, YOU WORRIED THAT YOUR FOOD WOULD RUN OUT BEFORE YOU GOT MONEY TO BUY MORE.: NEVER TRUE

## 2022-08-05 SDOH — ECONOMIC STABILITY: TRANSPORTATION INSECURITY
IN THE PAST 12 MONTHS, HAS LACK OF TRANSPORTATION KEPT YOU FROM MEETINGS, WORK, OR FROM GETTING THINGS NEEDED FOR DAILY LIVING?: NO

## 2022-08-05 SDOH — ECONOMIC STABILITY: TRANSPORTATION INSECURITY
IN THE PAST 12 MONTHS, HAS THE LACK OF TRANSPORTATION KEPT YOU FROM MEDICAL APPOINTMENTS OR FROM GETTING MEDICATIONS?: NO

## 2022-08-05 ASSESSMENT — SOCIAL DETERMINANTS OF HEALTH (SDOH): HOW HARD IS IT FOR YOU TO PAY FOR THE VERY BASICS LIKE FOOD, HOUSING, MEDICAL CARE, AND HEATING?: NOT HARD AT ALL

## 2022-08-05 NOTE — PROGRESS NOTES
Subjective:      Patient ID: Juany Felix is a 59 y.o. female.     Chief Complaint   Patient presents with    Dizziness     X 1 week        Patient presents with:  Dizziness: X 1 week    Here for the above  She notes with change in position  Getting up or down  No fever no ear pain   No ha   Vision is ok   No cold sx  She does note some allergy symptoms   No travel     YOB: 1965    Date of Visit:  8/5/2022     -- Methylprednisolone -- Hives   -- Niaspan [Niacin Er] -- Hives   -- Percocet [Oxycodone-Acetaminophen] -- Other (See Comments)    --  Dropped her blood pressure to low   -- Skelaxin [Metaxalone] -- Hives   -- Sulfamethoxazole-Trimethoprim -- Hives   -- Ciprofloxacin -- Rash   -- Omnicef -- Rash    Current Outpatient Medications:  vitamin D (ERGOCALCIFEROL) 1.25 MG (73923 UT) CAPS capsule, Take 1 capsule by mouth every 30 days, Disp: 12 capsule, Rfl: 3  levothyroxine (SYNTHROID) 100 MCG tablet, TAKE 1 TABLET BY MOUTH DAILY, Disp: 90 tablet, Rfl: 1  chlorthalidone (HYGROTON) 25 MG tablet, Take 1 tablet by mouth daily, Disp: 90 tablet, Rfl: 3  rosuvastatin (CRESTOR) 10 MG tablet, Take 1 tablet by mouth daily, Disp: 90 tablet, Rfl: 2  loratadine (CLARITIN) 10 MG tablet, Take 10 mg by mouth daily, Disp: , Rfl:    azelastine (ASTELIN) 0.1 % nasal spray, USE 1 SPRAY IN EACH NOSTRIL TWICE DAILY AS DIRECTED, Disp: 60 mL, Rfl: 1  Omega-3 Fatty Acids (RA FISH OIL) 1400 MG CPDR, Take by mouth, Disp: , Rfl:    VITAMIN E BLEND PO, Take by mouth, Disp: , Rfl:   triamcinolone (NASACORT) 55 MCG/ACT nasal inhaler, 2 sprays by Nasal route daily, Disp: , Rfl:     No current facility-administered medications for this visit.      ---------------------------               08/05/22                      1441         ---------------------------   BP:          128/86         Site:    Left Upper Arm     Position:     Sitting        Cuff Size:  Medium Adult      Pulse:         88 Temp:   97.2 °F (36.2 °C)   TempSrc:    Temporal        Weight: 182 lb (82.6 kg)    Height:  5' 7\" (1.702 m)   ---------------------------  Body mass index is 28.51 kg/m². Wt Readings from Last 3 Encounters:  08/05/22 : 182 lb (82.6 kg)  06/23/22 : 181 lb (82.1 kg)  04/19/22 : 190 lb (86.2 kg)    BP Readings from Last 3 Encounters:  08/05/22 : 128/86  06/23/22 : 118/76  04/19/22 : 134/80              Review of Systems    Objective:   Physical Exam  Constitutional:       General: She is not in acute distress. Appearance: Normal appearance. She is well-developed. She is not ill-appearing or diaphoretic. HENT:      Head: Normocephalic and atraumatic. Right Ear: Tympanic membrane and ear canal normal.      Left Ear: Tympanic membrane and ear canal normal.      Ears:      Comments: She is still in her soft collar   Eyes:      General: No scleral icterus. Extraocular Movements: Extraocular movements intact. Right eye: No nystagmus. Left eye: No nystagmus. Pupils: Pupils are equal, round, and reactive to light. Neck:      Thyroid: No thyroid mass or thyromegaly. Comments: I did not move the head   Cardiovascular:      Rate and Rhythm: Normal rate and regular rhythm. Heart sounds: Normal heart sounds. No murmur heard. No friction rub. No gallop. Pulmonary:      Effort: Pulmonary effort is normal. No tachypnea, accessory muscle usage or respiratory distress. Breath sounds: Normal breath sounds. No decreased breath sounds, wheezing, rhonchi or rales. Chest:   Breasts:     Right: No supraclavicular adenopathy. Left: No supraclavicular adenopathy. Lymphadenopathy:      Cervical: No cervical adenopathy. Upper Body:      Right upper body: No supraclavicular adenopathy. Left upper body: No supraclavicular adenopathy. Skin:     General: Skin is warm and dry. Coloration: Skin is not pale.    Neurological:      General: No focal deficit present. Mental Status: She is alert. Cranial Nerves: Cranial nerves are intact. Assessment:       Diagnosis Orders   1.  Vertigo          Orders Placed This Encounter   Medications    meclizine (ANTIVERT) 25 MG tablet     Sig: Take 1 tablet by mouth 3 times daily as needed for Dizziness     Dispense:  15 tablet     Refill:  0           Plan:      Do take fluids  Use the medicine for the symptoms  Call if not resolved in a week         Cara Bay MD

## 2022-11-02 ENCOUNTER — TELEMEDICINE (OUTPATIENT)
Dept: FAMILY MEDICINE CLINIC | Age: 57
End: 2022-11-02
Payer: COMMERCIAL

## 2022-11-02 DIAGNOSIS — B96.89 ACUTE BACTERIAL SINUSITIS: Primary | ICD-10-CM

## 2022-11-02 DIAGNOSIS — J01.90 ACUTE BACTERIAL SINUSITIS: Primary | ICD-10-CM

## 2022-11-02 PROCEDURE — G8427 DOCREV CUR MEDS BY ELIG CLIN: HCPCS | Performed by: INTERNAL MEDICINE

## 2022-11-02 PROCEDURE — 99213 OFFICE O/P EST LOW 20 MIN: CPT | Performed by: INTERNAL MEDICINE

## 2022-11-02 PROCEDURE — 3017F COLORECTAL CA SCREEN DOC REV: CPT | Performed by: INTERNAL MEDICINE

## 2022-11-02 RX ORDER — DOXYCYCLINE HYCLATE 100 MG
100 TABLET ORAL 2 TIMES DAILY
Qty: 20 TABLET | Refills: 0 | Status: SHIPPED | OUTPATIENT
Start: 2022-11-02 | End: 2022-11-12

## 2022-11-02 ASSESSMENT — ENCOUNTER SYMPTOMS
SINUS PAIN: 1
SHORTNESS OF BREATH: 0
WHEEZING: 0
COUGH: 1
RHINORRHEA: 1
SINUS PRESSURE: 1

## 2022-11-02 NOTE — PROGRESS NOTES
2022    TELEHEALTH EVALUATION -- Audio/Visual (During AVMMP-56 public health emergency)    HPI:    Shannan Mar (:  1965) has requested an audio/video evaluation for the following concern(s):    Chief Complaint   Patient presents with    Cough     Virtual Visit. [phone]. Sinus pressure congestion and drainage, dry cough, acid reflux. No fever, no SOB and no covid testing. Had for 2 weeks and getting worse. Tried Sudafed, netti pot and taking daily allergy medication    Shannan Mar is a 59 y.o. female with the following history as recorded in [facility]:  Patient Active Problem List    Diagnosis Date Noted    Basal cell carcinoma of right lower eyelid 2020    Dysuria 2018    Essential hypertension 10/10/2017    Kidney stones 10/10/2017    Prediabetes 2015    RUQ pain 10/30/2014    Vitamin D deficiency 2013    DDD (degenerative disc disease), lumbar 2011    Fatty liver disease, nonalcoholic     Hypothyroid     Hyperlipidemia     AR (allergic rhinitis)     Migraine      Current Outpatient Medications   Medication Sig Dispense Refill    vitamin D (ERGOCALCIFEROL) 1.25 MG (73972 UT) CAPS capsule Take 1 capsule by mouth every 30 days 12 capsule 3    levothyroxine (SYNTHROID) 100 MCG tablet TAKE 1 TABLET BY MOUTH DAILY 90 tablet 1    chlorthalidone (HYGROTON) 25 MG tablet Take 1 tablet by mouth daily 90 tablet 3    rosuvastatin (CRESTOR) 10 MG tablet Take 1 tablet by mouth daily 90 tablet 2    loratadine (CLARITIN) 10 MG tablet Take 10 mg by mouth daily      azelastine (ASTELIN) 0.1 % nasal spray USE 1 SPRAY IN EACH NOSTRIL TWICE DAILY AS DIRECTED 60 mL 1    Omega-3 Fatty Acids (RA FISH OIL) 1400 MG CPDR Take by mouth      VITAMIN E BLEND PO Take by mouth      triamcinolone (NASACORT) 55 MCG/ACT nasal inhaler 2 sprays by Nasal route daily       No current facility-administered medications for this visit.      Allergies: Methylprednisolone, Niaspan [niacin er], Percocet [oxycodone-acetaminophen], Skelaxin [metaxalone], Sulfamethoxazole-trimethoprim, Ciprofloxacin, and Omnicef  Past Medical History:   Diagnosis Date    Abdominal pain, other specified site     AR (allergic rhinitis)     Diarrhea     Hyperlipidemia     Impaired fasting glucose     Migraine     Rectal bleeding     Reflux gastritis     Thyroid disease     hypothyroidism     Past Surgical History:   Procedure Laterality Date    COLONOSCOPY  11/26/2013    normal dr Alice Bliss, hemorrhoid and diverticula    COLONOSCOPY  02/05/2019    dr Ramiro Duncan, small polyp, repeat 5 years    CYSTOURETHROSCOPY/STENT REMOVAL      HYSTERECTOMY (CERVIX STATUS UNKNOWN)  08/31/2012    ovaries in place. no cancer    KIDNEY SURGERY      KNEE ARTHROSCOPY      RIGHT    LITHOTRIPSY      MOHS SURGERY  06/02/2020    SPINE SURGERY  08/2013    lower back microdiscectomy     UPPER GASTROINTESTINAL ENDOSCOPY  11/26/2013    normal dr moore. gastritis    URETER STENT PLACEMENT      URETEROSCOPY  09/26/2017     Family History   Adopted: Yes   Problem Relation Age of Onset    Diabetes Mother     Heart Disease Father     Cancer Brother         colon     Social History     Tobacco Use    Smoking status: Never    Smokeless tobacco: Never   Substance Use Topics    Alcohol use: Yes     Alcohol/week: 1.0 standard drink     Types: 1 Glasses of wine per week     Comment: once a month        Review of Systems   Constitutional:  Negative for chills, diaphoresis and fatigue. HENT:  Positive for congestion, postnasal drip, rhinorrhea, sinus pressure and sinus pain. Respiratory:  Positive for cough. Negative for shortness of breath and wheezing. Cardiovascular:  Negative for chest pain and palpitations. Genitourinary:  Negative for dysuria and frequency. Prior to Visit Medications    Medication Sig Taking?  Authorizing Provider   vitamin D (ERGOCALCIFEROL) 1.25 MG (92444 UT) CAPS capsule Take 1 capsule by mouth every 30 days Yes Harish Farris MD levothyroxine (SYNTHROID) 100 MCG tablet TAKE 1 TABLET BY MOUTH DAILY Yes Cassia Vogt MD   chlorthalidone (HYGROTON) 25 MG tablet Take 1 tablet by mouth daily Yes Bradly Reynoso MD   rosuvastatin (CRESTOR) 10 MG tablet Take 1 tablet by mouth daily Yes Cassia Vogt MD   loratadine (CLARITIN) 10 MG tablet Take 10 mg by mouth daily Yes Historical Provider, MD   azelastine (ASTELIN) 0.1 % nasal spray USE 1 SPRAY IN EACH NOSTRIL TWICE DAILY AS DIRECTED Yes Rolando Barnett MD   Omega-3 Fatty Acids (RA FISH OIL) 1400 MG CPDR Take by mouth Yes Historical Provider, MD   VITAMIN E BLEND PO Take by mouth Yes Historical Provider, MD   triamcinolone (NASACORT) 55 MCG/ACT nasal inhaler 2 sprays by Nasal route daily Yes Historical Provider, MD       Social History     Tobacco Use    Smoking status: Never    Smokeless tobacco: Never   Vaping Use    Vaping Use: Never used   Substance Use Topics    Alcohol use: Yes     Alcohol/week: 1.0 standard drink     Types: 1 Glasses of wine per week     Comment: once a month    Drug use: No            PHYSICAL EXAMINATION:  [ INSTRUCTIONS:  \"[x]\" Indicates a positive item  \"[]\" Indicates a negative item  -- DELETE ALL ITEMS NOT EXAMINED]  Vital Signs: (As obtained by patient/caregiver or practitioner observation)    Blood pressure-  Heart rate-    Respiratory rate- 12   Temperature-  Pulse oximetry-     Constitutional: [x] Appears well-developed and well-nourished [x] No apparent distress      [] Abnormal-   Mental status  [x] Alert and awake  [] Oriented to person/place/time []Able to follow commands      Eyes:  EOM    [x]  Normal  [] Abnormal-  Sclera  []  Normal  [] Abnormal -         Discharge []  None visible  [] Abnormal -    HENT:   [x] Normocephalic, atraumatic.   [] Abnormal   [] Mouth/Throat: Mucous membranes are moist.     External Ears [x] Normal  [] Abnormal-     Neck: [x] No visualized mass     Pulmonary/Chest: [x] Respiratory effort normal.  [x] No visualized signs of difficulty breathing or respiratory distress        [] Abnormal-      Musculoskeletal:   [] Normal gait with no signs of ataxia         [] Normal range of motion of neck        [] Abnormal-       Neurological:        [x] No Facial Asymmetry (Cranial nerve 7 motor function) (limited exam to video visit)          [x] No gaze palsy        [] Abnormal-         Skin:        [x] No significant exanthematous lesions or discoloration noted on facial skin         [] Abnormal-            Psychiatric:       [x] Normal Affect [] No Hallucinations        [] Abnormal-     Other pertinent observable physical exam findings-     ASSESSMENT/PLAN:   Diagnosis Orders   1. Acute bacterial sinusitis           Outpatient Encounter Medications as of 11/2/2022   Medication Sig Dispense Refill    doxycycline hyclate (VIBRA-TABS) 100 MG tablet Take 1 tablet by mouth 2 times daily for 10 days 20 tablet 0    vitamin D (ERGOCALCIFEROL) 1.25 MG (07087 UT) CAPS capsule Take 1 capsule by mouth every 30 days 12 capsule 3    levothyroxine (SYNTHROID) 100 MCG tablet TAKE 1 TABLET BY MOUTH DAILY 90 tablet 1    chlorthalidone (HYGROTON) 25 MG tablet Take 1 tablet by mouth daily 90 tablet 3    rosuvastatin (CRESTOR) 10 MG tablet Take 1 tablet by mouth daily 90 tablet 2    loratadine (CLARITIN) 10 MG tablet Take 10 mg by mouth daily      azelastine (ASTELIN) 0.1 % nasal spray USE 1 SPRAY IN EACH NOSTRIL TWICE DAILY AS DIRECTED 60 mL 1    Omega-3 Fatty Acids (RA FISH OIL) 1400 MG CPDR Take by mouth      VITAMIN E BLEND PO Take by mouth      triamcinolone (NASACORT) 55 MCG/ACT nasal inhaler 2 sprays by Nasal route daily      [DISCONTINUED] meclizine (ANTIVERT) 25 MG tablet Take 1 tablet by mouth 3 times daily as needed for Dizziness 15 tablet 0     No facility-administered encounter medications on file as of 11/2/2022. No orders of the defined types were placed in this encounter.        Cory Christiansen, was evaluated through a synchronous (real-time) audio-video encounter. The patient (or guardian if applicable) is aware that this is a billable service, which includes applicable co-pays. This Virtual Visit was conducted with patient's (and/or legal guardian's) consent. The visit was conducted pursuant to the emergency declaration under the [address] [place] [address] authority and the [employer] General Act. Patient identification was verified, and a caregiver was present when appropriate. The patient was located at Home: [place] [address]. Provider was located at [facility] (Appt Dept): [address] [place] [address] [address] [facility],[address],  [facility]. Total time spent on this encounter: Not billed by time    --Bobby Choi DO on 11/2/2022 at 1:33 PM    An electronic signature was used to authenticate this note.

## 2022-11-11 RX ORDER — AMOXICILLIN AND CLAVULANATE POTASSIUM 875; 125 MG/1; MG/1
1 TABLET, FILM COATED ORAL 2 TIMES DAILY
Qty: 20 TABLET | Refills: 0 | Status: SHIPPED | OUTPATIENT
Start: 2022-11-11 | End: 2022-11-21

## 2022-11-11 RX ORDER — AMOXICILLIN AND CLAVULANATE POTASSIUM 875; 125 MG/1; MG/1
1 TABLET, FILM COATED ORAL 2 TIMES DAILY
Qty: 14 TABLET | Refills: 0 | OUTPATIENT
Start: 2022-11-11 | End: 2022-11-18

## 2022-11-11 NOTE — TELEPHONE ENCOUNTER
I will change her to augmentin, which is a penicillin type medicine  She used that last year with good results  Where to ?   Worse to urgent care or ER

## 2022-11-11 NOTE — TELEPHONE ENCOUNTER
Patient did VV with Dr Jesus Martinez 11/2/22 for acute bacterial sinusitis, and prescribed doxycycline and today is the last day for that. She calling this morning she feels no better, little worse, blowing colored mucus, no fever. [place] She was with a friend on Wednesday that tested positive for covid. She did home covid test today and it is negative. No available appts with anyone today. Can you call something else in? [facility].     Please call, [phone]

## 2022-11-14 ENCOUNTER — TELEPHONE (OUTPATIENT)
Dept: FAMILY MEDICINE CLINIC | Age: 57
End: 2022-11-14

## 2022-11-14 NOTE — TELEPHONE ENCOUNTER
----- Message from Betsy Miranda sent at 11/14/2022 12:04 PM EST -----  Regarding: Recent medication  Hi Dr. Emani Shelton,    I was recently prescribed Augmentin and after taking 2 doses I started breaking out in hives. So, I stopped the medication. I'm still a little stuffy but not as bad as I was last Friday. So, not sure if you want to prescribe something else or just wait it out for the week.   But, please put Augmentin on my allergy list.    Thanks,  Joe Laboy

## 2022-11-14 NOTE — TELEPHONE ENCOUNTER
If not getting worse. No fever then wait and see if the body does not clear this up .  Call if persisting

## 2022-11-21 DIAGNOSIS — E03.9 ACQUIRED HYPOTHYROIDISM: ICD-10-CM

## 2022-11-21 DIAGNOSIS — E78.2 MIXED HYPERLIPIDEMIA: ICD-10-CM

## 2022-11-21 DIAGNOSIS — R73.09 ELEVATED GLUCOSE: ICD-10-CM

## 2022-11-21 LAB
A/G RATIO: 2 (ref 1.1–2.2)
ALBUMIN SERPL-MCNC: 4.5 G/DL (ref 3.4–5)
ALP BLD-CCNC: 165 U/L (ref 40–129)
ALT SERPL-CCNC: 25 U/L (ref 10–40)
ANION GAP SERPL CALCULATED.3IONS-SCNC: 17 MMOL/L (ref 3–16)
AST SERPL-CCNC: 22 U/L (ref 15–37)
BILIRUB SERPL-MCNC: 0.5 MG/DL (ref 0–1)
BUN BLDV-MCNC: 17 MG/DL (ref 7–20)
CALCIUM SERPL-MCNC: 9.7 MG/DL (ref 8.3–10.6)
CHLORIDE BLD-SCNC: 102 MMOL/L (ref 99–110)
CHOLESTEROL, TOTAL: 209 MG/DL (ref 0–199)
CO2: 23 MMOL/L (ref 21–32)
CREAT SERPL-MCNC: 0.8 MG/DL (ref 0.6–1.1)
GFR SERPL CREATININE-BSD FRML MDRD: >60 ML/MIN/{1.73_M2}
GLUCOSE BLD-MCNC: 155 MG/DL (ref 70–99)
HDLC SERPL-MCNC: 51 MG/DL (ref 40–60)
LDL CHOLESTEROL CALCULATED: 122 MG/DL
POTASSIUM SERPL-SCNC: 3.9 MMOL/L (ref 3.5–5.1)
SODIUM BLD-SCNC: 142 MMOL/L (ref 136–145)
TOTAL PROTEIN: 6.7 G/DL (ref 6.4–8.2)
TRIGL SERPL-MCNC: 180 MG/DL (ref 0–150)
TSH SERPL DL<=0.05 MIU/L-ACNC: 0.49 UIU/ML (ref 0.27–4.2)
VLDLC SERPL CALC-MCNC: 36 MG/DL

## 2022-11-22 ENCOUNTER — HOSPITAL ENCOUNTER (OUTPATIENT)
Dept: GENERAL RADIOLOGY | Age: 57
Discharge: HOME | End: 2022-11-22
Payer: COMMERCIAL

## 2022-11-22 ENCOUNTER — HOSPITAL ENCOUNTER (OUTPATIENT)
Age: 57
Discharge: HOME | End: 2022-11-22
Payer: COMMERCIAL

## 2022-11-22 ENCOUNTER — TELEPHONE (OUTPATIENT)
Dept: FAMILY MEDICINE CLINIC | Age: 57
End: 2022-11-22

## 2022-11-22 DIAGNOSIS — R05.1 ACUTE COUGH: Primary | ICD-10-CM

## 2022-11-22 DIAGNOSIS — R05.1 ACUTE COUGH: ICD-10-CM

## 2022-11-22 LAB
ESTIMATED AVERAGE GLUCOSE: 154.2 MG/DL
HBA1C MFR BLD: 7 %

## 2022-11-22 PROCEDURE — 71046 X-RAY EXAM CHEST 2 VIEWS: CPT

## 2022-11-22 RX ORDER — ALBUTEROL SULFATE 90 UG/1
2 AEROSOL, METERED RESPIRATORY (INHALATION) 4 TIMES DAILY PRN
Qty: 18 G | Refills: 0 | Status: SHIPPED | OUTPATIENT
Start: 2022-11-22

## 2022-11-22 NOTE — PROGRESS NOTES
She had inhaler sent in  She has reaction to steroid in past  Called and discussed with her     Orders Placed This Encounter   Medications    albuterol sulfate HFA (VENTOLIN HFA) 108 (90 Base) MCG/ACT inhaler     Sig: Inhale 2 puffs into the lungs 4 times daily as needed for Wheezing (or cough) Dispense with spacing device     Dispense:  18 g     Refill:  0

## 2022-11-22 NOTE — TELEPHONE ENCOUNTER
Pt is still dealing with a wet cough, sore throat, headache from the drainage. Pt would like to know if she could be prescribed something else?     Deniz Salinas advise  [phone] (home)

## 2022-12-06 ENCOUNTER — OFFICE VISIT (OUTPATIENT)
Dept: FAMILY MEDICINE CLINIC | Age: 57
End: 2022-12-06
Payer: COMMERCIAL

## 2022-12-06 VITALS
SYSTOLIC BLOOD PRESSURE: 122 MMHG | DIASTOLIC BLOOD PRESSURE: 78 MMHG | TEMPERATURE: 97.2 F | WEIGHT: 178 LBS | HEIGHT: 67 IN | BODY MASS INDEX: 27.94 KG/M2 | HEART RATE: 76 BPM

## 2022-12-06 DIAGNOSIS — I10 ESSENTIAL HYPERTENSION: Primary | ICD-10-CM

## 2022-12-06 DIAGNOSIS — E11.9 TYPE 2 DIABETES MELLITUS WITHOUT COMPLICATION, WITHOUT LONG-TERM CURRENT USE OF INSULIN (HCC): ICD-10-CM

## 2022-12-06 PROCEDURE — 3017F COLORECTAL CA SCREEN DOC REV: CPT | Performed by: FAMILY MEDICINE

## 2022-12-06 PROCEDURE — 99214 OFFICE O/P EST MOD 30 MIN: CPT | Performed by: FAMILY MEDICINE

## 2022-12-06 PROCEDURE — 3078F DIAST BP <80 MM HG: CPT | Performed by: FAMILY MEDICINE

## 2022-12-06 PROCEDURE — G8427 DOCREV CUR MEDS BY ELIG CLIN: HCPCS | Performed by: FAMILY MEDICINE

## 2022-12-06 PROCEDURE — G8417 CALC BMI ABV UP PARAM F/U: HCPCS | Performed by: FAMILY MEDICINE

## 2022-12-06 PROCEDURE — 3074F SYST BP LT 130 MM HG: CPT | Performed by: FAMILY MEDICINE

## 2022-12-06 PROCEDURE — 2022F DILAT RTA XM EVC RTNOPTHY: CPT | Performed by: FAMILY MEDICINE

## 2022-12-06 PROCEDURE — G8484 FLU IMMUNIZE NO ADMIN: HCPCS | Performed by: FAMILY MEDICINE

## 2022-12-06 PROCEDURE — 1036F TOBACCO NON-USER: CPT | Performed by: FAMILY MEDICINE

## 2022-12-06 PROCEDURE — 3051F HG A1C>EQUAL 7.0%<8.0%: CPT | Performed by: FAMILY MEDICINE

## 2022-12-06 RX ORDER — MULTIVIT WITH MINERALS/LUTEIN
1000 TABLET ORAL DAILY
COMMUNITY

## 2022-12-06 RX ORDER — METFORMIN HYDROCHLORIDE 500 MG/1
500 TABLET, EXTENDED RELEASE ORAL
Qty: 30 TABLET | Refills: 0 | Status: SHIPPED | OUTPATIENT
Start: 2022-12-06

## 2022-12-06 ASSESSMENT — PATIENT HEALTH QUESTIONNAIRE - PHQ9
1. LITTLE INTEREST OR PLEASURE IN DOING THINGS: 0
SUM OF ALL RESPONSES TO PHQ QUESTIONS 1-9: 0
2. FEELING DOWN, DEPRESSED OR HOPELESS: 0
SUM OF ALL RESPONSES TO PHQ QUESTIONS 1-9: 0
SUM OF ALL RESPONSES TO PHQ9 QUESTIONS 1 & 2: 0
SUM OF ALL RESPONSES TO PHQ QUESTIONS 1-9: 0
SUM OF ALL RESPONSES TO PHQ QUESTIONS 1-9: 0

## 2022-12-06 ASSESSMENT — ENCOUNTER SYMPTOMS: SHORTNESS OF BREATH: 0

## 2022-12-06 NOTE — PROGRESS NOTES
Subjective:      Patient ID: Velna Severin is a 59 y.o. female.     Chief Complaint   Patient presents with    Check-Up     Lipids, hypertension, thyroid - discuss lab results        Patient presents with:  Check-Up: Lipids, hypertension, thyroid - discuss lab results    Here for the above  She is well   She is adopted but not sure  But she does think her mother had dm    Meds noted    YOB: 1965    Date of Visit:  12/6/2022     -- Methylprednisolone -- Hives   -- Niaspan [Niacin Er] -- Hives   -- Percocet [Oxycodone-Acetaminophen] -- Other (See Comments)    --  Dropped her blood pressure to low   -- Skelaxin [Metaxalone] -- Hives   -- Sulfamethoxazole-Trimethoprim -- Hives   -- Ciprofloxacin -- Rash   -- Omnicef -- Rash    Current Outpatient Medications:  Ascorbic Acid (VITAMIN C) 1000 MG tablet, Take 1,000 mg by mouth daily, Disp: , Rfl:   Naproxen Sodium (ALEVE PO), Take by mouth, Disp: , Rfl:   albuterol sulfate HFA (VENTOLIN HFA) 108 (90 Base) MCG/ACT inhaler, Inhale 2 puffs into the lungs 4 times daily as needed for Wheezing (or cough) Dispense with spacing device, Disp: 18 g, Rfl: 0  vitamin D (ERGOCALCIFEROL) 1.25 MG (61352 UT) CAPS capsule, Take 1 capsule by mouth every 30 days, Disp: 12 capsule, Rfl: 3  levothyroxine (SYNTHROID) 100 MCG tablet, TAKE 1 TABLET BY MOUTH DAILY, Disp: 90 tablet, Rfl: 1  chlorthalidone (HYGROTON) 25 MG tablet, Take 1 tablet by mouth daily, Disp: 90 tablet, Rfl: 3  rosuvastatin (CRESTOR) 10 MG tablet, Take 1 tablet by mouth daily, Disp: 90 tablet, Rfl: 2  loratadine (CLARITIN) 10 MG tablet, Take 10 mg by mouth daily, Disp: , Rfl:    azelastine (ASTELIN) 0.1 % nasal spray, USE 1 SPRAY IN EACH NOSTRIL TWICE DAILY AS DIRECTED, Disp: 60 mL, Rfl: 1  Omega-3 Fatty Acids (RA FISH OIL) 1400 MG CPDR, Take by mouth, Disp: , Rfl:    VITAMIN E BLEND PO, Take by mouth, Disp: , Rfl:   triamcinolone (NASACORT) 55 MCG/ACT nasal inhaler, 2 sprays by Nasal route daily (Patient not taking: Reported on 12/6/2022), Disp: , Rfl:     No current facility-administered medications for this visit.      ---------------------------               12/06/22                      1028         ---------------------------   BP:          122/78         Site:    Left Upper Arm     Position:     Sitting        Cuff Size:  Medium Adult      Pulse:         76           Temp:   97.2 °F (36.2 °C)   TempSrc:    Temporal        Weight: 178 lb (80.7 kg)    Height:  5' 7\" (1.702 m)   ---------------------------  Body mass index is 27.88 kg/m². Wt Readings from Last 3 Encounters:  12/06/22 : 178 lb (80.7 kg)  08/05/22 : 182 lb (82.6 kg)  06/23/22 : 181 lb (82.1 kg)    BP Readings from Last 3 Encounters:  12/06/22 : 122/78  08/05/22 : 128/86  06/23/22 : 118/76              Review of Systems   Constitutional:  Negative for appetite change, chills, fever and unexpected weight change. Respiratory:  Negative for shortness of breath. Cardiovascular:  Negative for chest pain. Endocrine: Negative for polydipsia and polyuria. Genitourinary:  Negative for difficulty urinating, dysuria and hematuria. Objective:   Physical Exam  Constitutional:       General: She is not in acute distress. Appearance: Normal appearance. She is well-developed. She is not ill-appearing or diaphoretic. Cardiovascular:      Rate and Rhythm: Normal rate and regular rhythm. Heart sounds: Normal heart sounds. No murmur heard. No friction rub. No gallop. Pulmonary:      Effort: Pulmonary effort is normal. No tachypnea, accessory muscle usage or respiratory distress. Breath sounds: Normal breath sounds. No decreased breath sounds, wheezing, rhonchi or rales. Lymphadenopathy:      Cervical: No cervical adenopathy. Upper Body:      Right upper body: No supraclavicular adenopathy. Left upper body: No supraclavicular adenopathy. Skin:     General: Skin is warm and dry. Coloration: Skin is not pale. Neurological:      Mental Status: She is alert. Assessment:       Diagnosis Orders   1. Essential hypertension        2.  Type 2 diabetes mellitus without complication, without long-term current use of insulin ([facility])            Hemoglobin A1C   Date Value Ref Range Status   11/21/2022 7.0 See comment % Final     Comment:     Comment:  Diagnosis of Diabetes: > or = 6.5%  Increased risk of diabetes (Prediabetes): 5.7-6.4%  Glycemic Control: Nonpregnant Adults: <7.0%                    Pregnant: <6.0%           She declined flu shot  Discussed metformin  She did have loose stool in the past with it but will try again    Reviewed labs for the visit lipid cmp tsh hga1c done on 11/21/22  Ortho visit for her neck pain on 10/12/22 reviewed      Plan:      Continue the diet efforts  Do stay with the medicines  Call for any problems  See in about 3-4 months           Tanika Ruano MD

## 2022-12-06 NOTE — PATIENT INSTRUCTIONS
Continue the diet efforts  Do stay with the medicines  Call for any problems  See in about 3-4 months

## 2022-12-27 RX ORDER — LEVOTHYROXINE SODIUM 0.1 MG/1
100 TABLET ORAL DAILY
Qty: 90 TABLET | Refills: 1 | Status: SHIPPED | OUTPATIENT
Start: 2022-12-27

## 2023-01-03 ENCOUNTER — TELEPHONE (OUTPATIENT)
Dept: FAMILY MEDICINE CLINIC | Age: 58
End: 2023-01-03

## 2023-01-03 RX ORDER — METFORMIN HYDROCHLORIDE 500 MG/1
500 TABLET, EXTENDED RELEASE ORAL
Qty: 90 TABLET | Refills: 1 | Status: SHIPPED | OUTPATIENT
Start: 2023-01-03

## 2023-01-03 NOTE — TELEPHONE ENCOUNTER
----- Message from Brittaney Oconnor sent at 1/1/2023  8:10 PM EST -----  Regarding: Metformin Refill  Hi Dr. Dodson Fears,  I seem to be doing ok with the Metformin ER 500mg. My stools are softer but I don't have diarrhea. So,  I think it's ok to take for now. However, I will need a refill and I will need a 3 months prescrtiption instead of one .       Thank Rose Marie Ellis
[facility]   [facility]  Orders Placed This Encounter   Medications    metFORMIN (GLUCOPHAGE-XR) 500 MG extended release tablet     Sig: Take 1 tablet by mouth daily (with breakfast)     Dispense:  90 tablet     Refill:  1
[place] advised and verbalized understanding.
5

## 2023-01-05 RX ORDER — ROSUVASTATIN CALCIUM 10 MG/1
10 TABLET, COATED ORAL DAILY
Qty: 90 TABLET | Refills: 1 | Status: SHIPPED | OUTPATIENT
Start: 2023-01-05

## 2023-01-07 NOTE — TELEPHONE ENCOUNTER
----- Message from Adria Velasquez sent at 9/10/2020  2:30 PM EDT -----  Subject: Referral Request    QUESTIONS   Reason for referral request? Rhematologist referral requested by patient. Has the physician seen you for this condition before? No   Preferred Specialist (if [facility])? [facility] you already have an appointment scheduled? No  Additional Information for Provider?   ---------------------------------------------------------------------------  --------------  CALL BACK INFO  What is the best way for the office to contact you? OK to leave message on   voicemail  Preferred Call Back Phone Number?  [phone] [facility]

## 2023-04-13 NOTE — PATIENT INSTRUCTIONS
Thank you for choosing [facility]. Please bring a current list of medications to every appointment. Please contact your pharmacy for any prescription refill(s) that you are requesting. Patient Education        Sinusitis: Care Instructions  Your Care Instructions     Sinusitis is an infection of the lining of the sinus cavities in your head. Sinusitis often follows a cold. It causes pain and pressure in your head and face. In most cases, sinusitis gets better on its own in 1 to 2 weeks. But some mild symptoms may last for several weeks. Sometimes antibiotics are needed. Follow-up care is a key part of your treatment and safety. Be sure to make and go to all appointments, and call your doctor if you are having problems. It's also a good idea to know your test results and keep a list of the medicines you take. How can you care for yourself at home? · Take an over-the-counter pain medicine, such as acetaminophen (Tylenol), ibuprofen (Advil, Motrin), or naproxen (Aleve). Read and follow all instructions on the label. · If the doctor prescribed antibiotics, take them as directed. Do not stop taking them just because you feel better. You need to take the full course of antibiotics. · Be careful when taking over-the-counter cold or flu medicines and Tylenol at the same time. Many of these medicines have acetaminophen, which is Tylenol. Read the labels to make sure that you are not taking more than the recommended dose. Too much acetaminophen (Tylenol) can be harmful. · Breathe warm, moist air from a steamy shower, a hot bath, or a sink filled with hot water. Avoid cold, dry air. Using a humidifier in your home may help. Follow the directions for cleaning the machine. · Use saline (saltwater) nasal washes. This can help keep your nasal passages open and wash out mucus and bacteria. You can buy saline nose drops at a grocery store or drugstore.  Or you can make your own at home by adding 1 teaspoon of salt and 1 teaspoon of baking soda to 2 cups of distilled water. If you make your own, fill a bulb syringe with the solution, insert the tip into your nostril, and squeeze gently. Tevin Larissa your nose. · Put a hot, wet towel or a warm gel pack on your face 3 or 4 times a day for 5 to 10 minutes each time. · Try a decongestant nasal spray like oxymetazoline (Afrin). Do not use it for more than 3 days in a row. Using it for more than 3 days can make your congestion worse. When should you call for help? Call your doctor now or seek immediate medical care if:    · You have new or worse swelling or redness in your face or around your eyes.     · You have a new or higher fever. Watch closely for changes in your health, and be sure to contact your doctor if:    · You have new or worse facial pain.     · The mucus from your nose becomes thicker (like pus) or has new blood in it.     · You are not getting better as expected. Where can you learn more? Go to [email or web address] and sign in to your [employer] account. Enter B838 in the [employer] box to learn more about \"Sinusitis: Care Instructions. \"     If you do not have an account, please click on the \"Sign Up Now\" link. Current as of: December 2, 2020               Content Version: 12.9  © 1378-7707 Healthwise, Incorporated. Care instructions adapted under license by [facility] ([facility]). If you have questions about a medical condition or this instruction, always ask your healthcare professional. [address] any warranty or liability for your use of this information. PAST SURGICAL HISTORY:  No significant past surgical history

## 2023-05-10 ENCOUNTER — OFFICE VISIT (OUTPATIENT)
Dept: FAMILY MEDICINE CLINIC | Age: 58
End: 2023-05-10
Payer: COMMERCIAL

## 2023-05-10 VITALS
HEIGHT: 67 IN | TEMPERATURE: 97.8 F | WEIGHT: 177.8 LBS | SYSTOLIC BLOOD PRESSURE: 124 MMHG | DIASTOLIC BLOOD PRESSURE: 82 MMHG | BODY MASS INDEX: 27.91 KG/M2

## 2023-05-10 DIAGNOSIS — J01.90 ACUTE BACTERIAL SINUSITIS: Primary | ICD-10-CM

## 2023-05-10 DIAGNOSIS — B96.89 ACUTE BACTERIAL SINUSITIS: Primary | ICD-10-CM

## 2023-05-10 PROCEDURE — 99213 OFFICE O/P EST LOW 20 MIN: CPT | Performed by: INTERNAL MEDICINE

## 2023-05-10 PROCEDURE — 3017F COLORECTAL CA SCREEN DOC REV: CPT | Performed by: INTERNAL MEDICINE

## 2023-05-10 PROCEDURE — G8417 CALC BMI ABV UP PARAM F/U: HCPCS | Performed by: INTERNAL MEDICINE

## 2023-05-10 PROCEDURE — 1036F TOBACCO NON-USER: CPT | Performed by: INTERNAL MEDICINE

## 2023-05-10 PROCEDURE — 3074F SYST BP LT 130 MM HG: CPT | Performed by: INTERNAL MEDICINE

## 2023-05-10 PROCEDURE — 3079F DIAST BP 80-89 MM HG: CPT | Performed by: INTERNAL MEDICINE

## 2023-05-10 PROCEDURE — G8427 DOCREV CUR MEDS BY ELIG CLIN: HCPCS | Performed by: INTERNAL MEDICINE

## 2023-05-10 RX ORDER — DOXYCYCLINE HYCLATE 100 MG
100 TABLET ORAL 2 TIMES DAILY
Qty: 20 TABLET | Refills: 0 | Status: SHIPPED | OUTPATIENT
Start: 2023-05-10 | End: 2023-05-20

## 2023-05-10 SDOH — ECONOMIC STABILITY: INCOME INSECURITY: HOW HARD IS IT FOR YOU TO PAY FOR THE VERY BASICS LIKE FOOD, HOUSING, MEDICAL CARE, AND HEATING?: NOT HARD AT ALL

## 2023-05-10 SDOH — ECONOMIC STABILITY: FOOD INSECURITY: WITHIN THE PAST 12 MONTHS, YOU WORRIED THAT YOUR FOOD WOULD RUN OUT BEFORE YOU GOT MONEY TO BUY MORE.: NEVER TRUE

## 2023-05-10 SDOH — ECONOMIC STABILITY: HOUSING INSECURITY
IN THE LAST 12 MONTHS, WAS THERE A TIME WHEN YOU DID NOT HAVE A STEADY PLACE TO SLEEP OR SLEPT IN A SHELTER (INCLUDING NOW)?: NO

## 2023-05-10 SDOH — ECONOMIC STABILITY: FOOD INSECURITY: WITHIN THE PAST 12 MONTHS, THE FOOD YOU BOUGHT JUST DIDN'T LAST AND YOU DIDN'T HAVE MONEY TO GET MORE.: NEVER TRUE

## 2023-05-10 ASSESSMENT — ENCOUNTER SYMPTOMS
SHORTNESS OF BREATH: 0
APNEA: 0
ABDOMINAL PAIN: 0

## 2023-05-10 ASSESSMENT — PATIENT HEALTH QUESTIONNAIRE - PHQ9
2. FEELING DOWN, DEPRESSED OR HOPELESS: 0
SUM OF ALL RESPONSES TO PHQ QUESTIONS 1-9: 0
SUM OF ALL RESPONSES TO PHQ9 QUESTIONS 1 & 2: 0
1. LITTLE INTEREST OR PLEASURE IN DOING THINGS: 0
SUM OF ALL RESPONSES TO PHQ QUESTIONS 1-9: 0

## 2023-05-10 NOTE — PATIENT INSTRUCTIONS
Thank you for choosing [facility]. Please bring a current list of medications to every appointment. Please contact your pharmacy for any prescription refill(s) that you are requesting.

## 2023-05-10 NOTE — PROGRESS NOTES
Sherly Jackson (:  1965) is a 59 y.o. female,Established patient, here for evaluation of the following chief complaint(s):  Sinusitis (Patient c/o nasal congestion, sore throat, headache x 3 days; post nasal drip. Patient denies cough, fever.   Patient has taken OTC Motrin)  Sherly Jackson is a 59 y.o. female with the following history as recorded in [facility]:  Patient Active Problem List    Diagnosis Date Noted    Basal cell carcinoma of right lower eyelid 2020    Dysuria 2018    Essential hypertension 10/10/2017    Kidney stones 10/10/2017    Prediabetes 2015    RUQ pain 10/30/2014    Vitamin D deficiency 2013    DDD (degenerative disc disease), lumbar 2011    Fatty liver disease, nonalcoholic     Hypothyroid     Hyperlipidemia     AR (allergic rhinitis)     Migraine      Current Outpatient Medications   Medication Sig Dispense Refill    rosuvastatin (CRESTOR) 10 MG tablet TAKE 1 TABLET BY MOUTH DAILY 90 tablet 1    metFORMIN (GLUCOPHAGE-XR) 500 MG extended release tablet Take 1 tablet by mouth daily (with breakfast) 90 tablet 1    levothyroxine (SYNTHROID) 100 MCG tablet TAKE 1 TABLET BY MOUTH DAILY 90 tablet 1    Ascorbic Acid (VITAMIN C) 1000 MG tablet Take 1 tablet by mouth daily      albuterol sulfate HFA (VENTOLIN HFA) 108 (90 Base) MCG/ACT inhaler Inhale 2 puffs into the lungs 4 times daily as needed for Wheezing (or cough) Dispense with spacing device 18 g 0    vitamin D (ERGOCALCIFEROL) 1.25 MG (23477 UT) CAPS capsule Take 1 capsule by mouth every 30 days 12 capsule 3    chlorthalidone (HYGROTON) 25 MG tablet Take 1 tablet by mouth daily 90 tablet 3    loratadine (CLARITIN) 10 MG tablet Take 1 tablet by mouth daily      azelastine (ASTELIN) 0.1 % nasal spray USE 1 SPRAY IN EACH NOSTRIL TWICE DAILY AS DIRECTED 60 mL 1    Omega-3 Fatty Acids (RA FISH OIL) 1400 MG CPDR Take by mouth      VITAMIN E BLEND PO Take by mouth       No current

## 2023-06-02 ENCOUNTER — HOSPITAL ENCOUNTER (OUTPATIENT)
Dept: GENERAL RADIOLOGY | Age: 58
Discharge: HOME | End: 2023-06-02
Payer: COMMERCIAL

## 2023-06-02 ENCOUNTER — HOSPITAL ENCOUNTER (OUTPATIENT)
Age: 58
Discharge: HOME | End: 2023-06-02
Payer: COMMERCIAL

## 2023-06-02 DIAGNOSIS — N20.0 CALCULUS OF KIDNEY: ICD-10-CM

## 2023-06-02 PROCEDURE — 74018 RADEX ABDOMEN 1 VIEW: CPT

## 2023-06-23 DIAGNOSIS — E11.9 TYPE 2 DIABETES MELLITUS WITHOUT COMPLICATION, WITHOUT LONG-TERM CURRENT USE OF INSULIN (HCC): ICD-10-CM

## 2023-06-23 DIAGNOSIS — I10 ESSENTIAL HYPERTENSION: Primary | ICD-10-CM

## 2023-06-23 RX ORDER — LEVOTHYROXINE SODIUM 0.1 MG/1
100 TABLET ORAL DAILY
Qty: 90 TABLET | Refills: 0 | Status: SHIPPED | OUTPATIENT
Start: 2023-06-23

## 2023-06-23 RX ORDER — ROSUVASTATIN CALCIUM 10 MG/1
10 TABLET, COATED ORAL DAILY
Qty: 90 TABLET | Refills: 0 | Status: SHIPPED | OUTPATIENT
Start: 2023-06-23

## 2023-06-23 RX ORDER — METFORMIN HYDROCHLORIDE 500 MG/1
TABLET, EXTENDED RELEASE ORAL
Qty: 90 TABLET | Refills: 0 | Status: SHIPPED | OUTPATIENT
Start: 2023-06-23

## 2023-06-23 NOTE — TELEPHONE ENCOUNTER
Georgia Matthews advised and scheduled appt on 7-. She is requesting labs order before her appt. Please advise.

## 2023-07-20 DIAGNOSIS — E11.9 TYPE 2 DIABETES MELLITUS WITHOUT COMPLICATION, WITHOUT LONG-TERM CURRENT USE OF INSULIN (HCC): ICD-10-CM

## 2023-07-20 DIAGNOSIS — I10 ESSENTIAL HYPERTENSION: ICD-10-CM

## 2023-07-20 LAB
ALBUMIN SERPL-MCNC: 4.6 G/DL (ref 3.4–5)
ALBUMIN/GLOB SERPL: 2 {RATIO} (ref 1.1–2.2)
ALP SERPL-CCNC: 130 U/L (ref 40–129)
ALT SERPL-CCNC: 29 U/L (ref 10–40)
ANION GAP SERPL CALCULATED.3IONS-SCNC: 12 MMOL/L (ref 3–16)
AST SERPL-CCNC: 24 U/L (ref 15–37)
BACTERIA URNS QL MICRO: ABNORMAL /HPF
BILIRUB SERPL-MCNC: 0.8 MG/DL (ref 0–1)
BILIRUB UR QL STRIP.AUTO: NEGATIVE
BUN SERPL-MCNC: 16 MG/DL (ref 7–20)
CALCIUM SERPL-MCNC: 10.1 MG/DL (ref 8.3–10.6)
CHLORIDE SERPL-SCNC: 100 MMOL/L (ref 99–110)
CHOLEST SERPL-MCNC: 192 MG/DL (ref 0–199)
CLARITY UR: CLEAR
CO2 SERPL-SCNC: 30 MMOL/L (ref 21–32)
COLOR UR: YELLOW
CREAT SERPL-MCNC: 0.8 MG/DL (ref 0.6–1.2)
CREAT UR-MCNC: 67.6 MG/DL (ref 28–259)
EPI CELLS #/AREA URNS AUTO: 5 /HPF (ref 0–5)
GFR SERPLBLD CREATININE-BSD FMLA CKD-EPI: >60 ML/MIN/{1.73_M2}
GLUCOSE SERPL-MCNC: 174 MG/DL (ref 70–99)
GLUCOSE UR STRIP.AUTO-MCNC: NEGATIVE MG/DL
HDLC SERPL-MCNC: 45 MG/DL (ref 40–60)
HGB UR QL STRIP.AUTO: NEGATIVE
HYALINE CASTS #/AREA URNS AUTO: 0 /LPF (ref 0–8)
KETONES UR STRIP.AUTO-MCNC: NEGATIVE MG/DL
LDLC SERPL CALC-MCNC: 100 MG/DL
LEUKOCYTE ESTERASE UR QL STRIP.AUTO: ABNORMAL
MICROALBUMIN UR DL<=1MG/L-MCNC: <1.2 MG/DL
MICROALBUMIN/CREAT UR: NORMAL MG/G (ref 0–30)
NITRITE UR QL STRIP.AUTO: NEGATIVE
PH UR STRIP.AUTO: 7 [PH] (ref 5–8)
POTASSIUM SERPL-SCNC: 4 MMOL/L (ref 3.5–5.1)
PROT SERPL-MCNC: 6.9 G/DL (ref 6.4–8.2)
PROT UR STRIP.AUTO-MCNC: NEGATIVE MG/DL
RBC CLUMPS #/AREA URNS AUTO: 2 /HPF (ref 0–4)
SODIUM SERPL-SCNC: 142 MMOL/L (ref 136–145)
SP GR UR STRIP.AUTO: 1.01 (ref 1–1.03)
TRIGL SERPL-MCNC: 236 MG/DL (ref 0–150)
UA DIPSTICK W REFLEX MICRO PNL UR: YES
URN SPEC COLLECT METH UR: ABNORMAL
UROBILINOGEN UR STRIP-ACNC: 0.2 E.U./DL
VLDLC SERPL CALC-MCNC: 47 MG/DL
WBC #/AREA URNS AUTO: 4 /HPF (ref 0–5)

## 2023-07-21 LAB
EST. AVERAGE GLUCOSE BLD GHB EST-MCNC: 137 MG/DL
HBA1C MFR BLD: 6.4 %

## 2023-07-25 ENCOUNTER — OFFICE VISIT (OUTPATIENT)
Dept: FAMILY MEDICINE CLINIC | Age: 58
End: 2023-07-25
Payer: COMMERCIAL

## 2023-07-25 VITALS
HEIGHT: 67 IN | WEIGHT: 177.6 LBS | SYSTOLIC BLOOD PRESSURE: 118 MMHG | DIASTOLIC BLOOD PRESSURE: 72 MMHG | TEMPERATURE: 97.9 F | BODY MASS INDEX: 27.88 KG/M2 | HEART RATE: 68 BPM

## 2023-07-25 DIAGNOSIS — K62.5 RECTAL BLEED: ICD-10-CM

## 2023-07-25 DIAGNOSIS — I10 ESSENTIAL HYPERTENSION: Primary | ICD-10-CM

## 2023-07-25 DIAGNOSIS — E11.9 TYPE 2 DIABETES MELLITUS WITHOUT COMPLICATION, WITHOUT LONG-TERM CURRENT USE OF INSULIN (HCC): ICD-10-CM

## 2023-07-25 DIAGNOSIS — E78.2 MIXED HYPERLIPIDEMIA: ICD-10-CM

## 2023-07-25 PROCEDURE — 3017F COLORECTAL CA SCREEN DOC REV: CPT | Performed by: FAMILY MEDICINE

## 2023-07-25 PROCEDURE — 2022F DILAT RTA XM EVC RTNOPTHY: CPT | Performed by: FAMILY MEDICINE

## 2023-07-25 PROCEDURE — 3078F DIAST BP <80 MM HG: CPT | Performed by: FAMILY MEDICINE

## 2023-07-25 PROCEDURE — 3074F SYST BP LT 130 MM HG: CPT | Performed by: FAMILY MEDICINE

## 2023-07-25 PROCEDURE — 1036F TOBACCO NON-USER: CPT | Performed by: FAMILY MEDICINE

## 2023-07-25 PROCEDURE — G8427 DOCREV CUR MEDS BY ELIG CLIN: HCPCS | Performed by: FAMILY MEDICINE

## 2023-07-25 PROCEDURE — G8417 CALC BMI ABV UP PARAM F/U: HCPCS | Performed by: FAMILY MEDICINE

## 2023-07-25 PROCEDURE — 99214 OFFICE O/P EST MOD 30 MIN: CPT | Performed by: FAMILY MEDICINE

## 2023-07-25 PROCEDURE — 3044F HG A1C LEVEL LT 7.0%: CPT | Performed by: FAMILY MEDICINE

## 2023-07-25 RX ORDER — ROSUVASTATIN CALCIUM 10 MG/1
20 TABLET, COATED ORAL DAILY
Qty: 90 TABLET | Refills: 0 | Status: SHIPPED
Start: 2023-07-25

## 2023-07-25 ASSESSMENT — ENCOUNTER SYMPTOMS: SHORTNESS OF BREATH: 0

## 2023-07-25 NOTE — PROGRESS NOTES
Subjective:      Patient ID: Ember Mccray is a 59 y.o. female.     Chief Complaint   Patient presents with    Check-Up     Hypertension, diabetes, lipids, thyroid        Patient presents with:  Check-Up: Hypertension, diabetes, lipids, thyroid    Here for the above  Intermittent small bright red blood no pain per rectum for about 2 months     Glucose at home about 145    YOB: 1965    Date of Visit:  7/25/2023     -- Augmentin [Amoxicillin-Pot Clavulanate] -- Hives   -- Methylprednisolone -- Hives   -- Niaspan [Niacin Er] -- Hives   -- Percocet [Oxycodone-Acetaminophen] -- Other (See Comments)    --  Dropped her blood pressure to low   -- Skelaxin [Metaxalone] -- Hives   -- Sulfamethoxazole-Trimethoprim -- Hives   -- Ciprofloxacin -- Rash   -- Omnicef -- Rash    Current Outpatient Medications:  chlorthalidone (HYGROTON) 25 MG tablet, TAKE 1 TABLET BY MOUTH DAILY, Disp: 90 tablet, Rfl: 3  levothyroxine (SYNTHROID) 100 MCG tablet, TAKE 1 TABLET BY MOUTH DAILY, Disp: 90 tablet, Rfl: 0  metFORMIN (GLUCOPHAGE-XR) 500 MG extended release tablet, TAKE 1 TABLET BY MOUTH DAILY WITH BREAKFAST, Disp: 90 tablet, Rfl: 0  rosuvastatin (CRESTOR) 10 MG tablet, TAKE 1 TABLET BY MOUTH DAILY, Disp: 90 tablet, Rfl: 0  Ascorbic Acid (VITAMIN C) 1000 MG tablet, Take 1 tablet by mouth daily, Disp: , Rfl:   albuterol sulfate HFA (VENTOLIN HFA) 108 (90 Base) MCG/ACT inhaler, Inhale 2 puffs into the lungs 4 times daily as needed for Wheezing (or cough) Dispense with spacing device, Disp: 18 g, Rfl: 0  vitamin D (ERGOCALCIFEROL) 1.25 MG (57292 UT) CAPS capsule, Take 1 capsule by mouth every 30 days, Disp: 12 capsule, Rfl: 3  loratadine (CLARITIN) 10 MG tablet, Take 1 tablet by mouth daily, Disp: , Rfl:   azelastine (ASTELIN) 0.1 % nasal spray, USE 1 SPRAY IN EACH NOSTRIL TWICE DAILY AS DIRECTED, Disp: 60 mL, Rfl: 1  Omega-3 Fatty Acids (RA FISH OIL) 1400 MG CPDR, Take by mouth, Disp: , Rfl:    VITAMIN E BLEND PO, Take by

## 2023-09-12 RX ORDER — ROSUVASTATIN CALCIUM 10 MG/1
20 TABLET, COATED ORAL DAILY
Qty: 90 TABLET | Refills: 0 | Status: SHIPPED | OUTPATIENT
Start: 2023-09-12

## 2023-09-22 RX ORDER — LEVOTHYROXINE SODIUM 0.1 MG/1
100 TABLET ORAL DAILY
Qty: 90 TABLET | Refills: 1 | Status: SHIPPED | OUTPATIENT
Start: 2023-09-22

## 2023-09-22 RX ORDER — METFORMIN HYDROCHLORIDE 500 MG/1
TABLET, EXTENDED RELEASE ORAL
Qty: 90 TABLET | Refills: 1 | Status: SHIPPED | OUTPATIENT
Start: 2023-09-22

## 2023-10-06 ENCOUNTER — TELEPHONE (OUTPATIENT)
Dept: FAMILY MEDICINE CLINIC | Age: 58
End: 2023-10-06

## 2023-10-06 NOTE — TELEPHONE ENCOUNTER
----- Message from Grey Miranda sent at 10/6/2023 11:39 AM EDT -----  Regarding: Head Contusion  Contact: [phone]  Hi Dr. Ravindra Schaeffer,    Last Friday I was in [place] and playing [place] and I tripped and hit my head on the court. I immediately had a goose egg on my head. I went to [facility] and had a CT scan  they said everything was fine. Just a contusion. But, I'm now having vertigo. Should I have other testing done or will this go away?     Thanks,  Darrian Alcocer

## 2023-10-06 NOTE — TELEPHONE ENCOUNTER
Pt advised and verbalized understanding Neurology/Epilepsy Consult:    REYES CHARLINE 28y Male  MRN-[id number]    Patient is a 28y old  Male who presents with a chief complaint of seizure (02 Dec 2020 16:17)        HPI:  A 29 y/o male with pmhx of seizure 2014 brought in by EMS for seizure . Pt  reports was on his phone this morning when he experienced seizure, multiple times. Pt reports about to fall but his mom caught him. Pt denies hitting his head. Pt denies LOC, pt reports more or less remembers everything. Pt reports compliant with meds , only last night took his med a bit latter than usually. Pt reports smokes weed , daily and believes that helps him with seizure more than the meds. Pt reports cough started just now.  In ED while pt was trying to put his shoes fell on his buttock denies hitting head, loc. Pt denies any body pain. Pt denies biting his tongue.   Pt denies fever, chill, chest pain, shortness of breath, burning with urination.  (02 Dec 2020 16:17)        PAST MEDICAL & SURGICAL HISTORY:  Seizures    Left testicular torsion          FAMILY HISTORY:  Family history of seizures (Child)  Maternal Aunt          Social History:          Risk factors:  Born full-term, , no complications  Normal growth and development  No febrile seizures/CNS infections  No head trauma with loss of consciousness      Allergy:  Lamictal (Unknown)        Home Medications:  Aptiom 800 mg oral tablet: 1 tab(s) orally once a day (at bedtime) (02 Dec 2020 21:58)  KlonoPIN 0.5 mg oral tablet: 1 tab(s) orally 2 times a day (02 Dec 2020 21:57)  levETIRAcetam 500 mg oral tablet: 1 tab(s) orally every 12 hours (01 Mar 2020 10:51)  zonisamide 100 mg oral capsule: 3 cap(s) orally once a day (at bedtime) (02 Dec 2020 21:56)        MEDICATIONS  (STANDING):  chlorhexidine 4% Liquid 1 Application(s) Topical <User Schedule>  clonazePAM  Tablet 0.5 milliGRAM(s) Oral two times a day  enoxaparin Injectable 40 milliGRAM(s) SubCutaneous daily  eslicarbazepine 1400 milliGRAM(s) Oral <User Schedule>  levETIRAcetam 500 milliGRAM(s) Oral <User Schedule>  zonisamide 300 milliGRAM(s) Oral <User Schedule>    MEDICATIONS  (PRN):  LORazepam   Injectable 2 milliGRAM(s) IV Push three times a day PRN generalized tonic-clonic seizure lasting longer than 2 minutes, or two consecutive seizures iwthout return to baseline in-between        Review of systems:    Constitutional: No fever, weight loss or fatigue    Eyes: No eye pain or discharge  ENMT:  No difficulty hearing; No sinus or throat pain  Neck: No pain or stiffness  Respiratory: No cough, wheezing, chills or hemoptysis  Cardiovascular: No chest pain, palpitations, shortness of breath, dyspnea on exertion  Gastrointestinal: No abdominal pain, nausea, vomiting or hematemesis; No diarrhea or constipation.   Genitourinary: No dysuria, frequency, hematuria or incontinence  Neurological: As per HPI  Skin: No rashes or lesions   Endocrine: No heat or cold intolerance; No hair loss  Musculoskeletal: No joint pain or swelling  Psychiatric: No depression, anxiety, mood swings  Heme/Lymph: No easy bruising or bleeding gums        T(F): 97.3 (20 @ 05:47), Max: 99 (20 @ 15:29)  HR: 75 (20 @ 05:47) (70 - 96)  BP: 113/63 (20 @ 05:47) (101/56 - 147/75)  RR: 18 (20 @ 05:47) (16 - 20)  SpO2: 100% (20 @ 16:03) (99% - 100%)        Neurologic Examination:  General:  Appearance is consistent with chronologic age.  No abnormal facies.   General: The patient is oriented to person, place, time and date.  Recent and remote memory intact.  Follows 4-step directions. Fund of knowledge is intact and normal.  Language with normal repetition, comprehension and naming.  Nondysarthric.    Cranial nerves: EOMI w/o nystagmus, skew or reported double vision.  PERRL.  No ptosis/weakness of eyelid closure.  Facial sensation is normal with normal bite.  No facial asymmetry.  Hearing grossly intact b/l.  Palate elevates midline.  Tongue midline.  Motor examination:   Normal tone, bulk and range of motion.  No tenderness, twitching, tremors or involuntary movements.  Formal Muscle Strength Testin/5 UE; 5/5 LE.  No observable drift.  Reflexes:   2+ b/l pectoralis, biceps, triceps, brachioradialis, patella and Achilles.  Plantar response downgoing b/l.  Jaw jerk, Basim, clonus absent.  Sensory examination:   Intact to light touch and pinprick, pain, temperature and proprioception and vibration in all extremities.  Cerebellum:   FTN/HKS intact with normal ELDON in all limbs.  No dysmetria or dysdiadokinesia.  Gait narrow based and normal.        Labs:                         14.0   6.25  )-----------( 203      ( 02 Dec 2020 11:10 )             41.3         139  |  102  |  12  ----------------------------<  118<H>  4.4   |  27  |  1.1    Ca    9.4      02 Dec 2020 11:10    TPro  6.8  /  Alb  4.8  /  TBili  0.3  /  DBili  x   /  AST  8   /  ALT  11  /  AlkPhos  100      LIVER FUNCTIONS - ( 02 Dec 2020 11:10 )  Alb: 4.8 g/dL / Pro: 6.8 g/dL / ALK PHOS: 100 U/L / ALT: 11 U/L / AST: 8 U/L / GGT: x                       SARS-CoV-2: NotDetec (20 @ 12:18)          Neuroimaging:  CT Head: CT Head No Cont:   EXAM:  CT BRAIN            PROCEDURE DATE:  2020            INTERPRETATION:  CLINICAL INDICATION: Seizure    TECHNIQUE: CT of the head was performed without the administration of intravenous contrast.    COMPARISON: CT of the head dated 2019    FINDINGS:    There is no evidence of acute territorial/transcortical infarction or intracranial hemorrhage. There is no space-occupying lesion or midline shift.    There is no evidence of hydrocephalus. There are no extra-axial fluid collections.    The visualized intraorbital contents are normal. There is mild mucosal thickening in scattered portions of the visualized paranasal sinuses. TheThe mastoid air cells are aerated. The visualized soft tissues and osseous structures appear normal. Partially visualized parotid glands are normal.    IMPRESSION:    No acute intracranial pathology, stable since 2019.    RAHEEM PEDERSON M.D., ATTENDING RADIOLOGIST  This document has been electronically signed. Dec  2 2020  1:25PM (20 @ 13:11)    CT Angiography/Perfusion:   MRI Brain:   MRA Head/Neck:     Carotid US:       EEG:       Assessment:  This is a 28y Male with h/o       Discussed with Dr. Duong      Plan:   - VEEG monitoring for better characterization and assessment  - Seizure precautions  - Ativan 2mg IV PRN for generalized tonic-clonic seizure lasting longer than 2 minutes, or two consecutive seizures without return to baseline in-between (ordered)  - CBC, CMP, Mg, CK, AED levels trough (ordered)  - Keep Mg above 2    Plan discussed with patient in details, all questions answered.    Discussed with hospitalist and PA. Neurology/Epilepsy Consult:    CHARLINE CHAMBERS 28y Male  MRN-[id number]    Patient is a 28y old right-handed Male who presents with a chief complaint of multiple seizures        HPI: History obtained from patient and mother. EMR and outpatient records reviewed  Patient was brought to ED yesterday morning for evaluation s/p multiple seizures at home witnessed by mother. He received Valium 5mg by EMS prior to arrival. Patient had several more episodes while in the ED and was given Ativan 2mg x 1.  Patient is currently being followed at [facility] by Dr. Gordon, last office visit 2020.   Patient reports first ever witnessed seizure was in . He had 2 more in that same year and started seeing a neurologist at that time. Patient cannot describe his seizures. Pt endorses biting tongue and urinary incontinence sometimes during seizures. Is postictal afterwards. Describes his auras as sweating in the hands as well as intense anxiety. Reports that he will have events at work where he will have "spaced out" and will find himself having drooled onto his desk and a long period of time had passed. Also has happened while he was waiting for the bus recently. Most recent seizure was one week ago where he had the drooling episode. One prior to that was January.  Thinks that bright lights trigger seizures. Reports that marijuana helps get rid of his auras. When he wakes up with aura, he smokes marijuana to help ease himself and they go away. Reaction to lamictal--throat swells up. Denies any other symptoms--denies chest pain, shortness of breath, vision changes, numbness/tingling, weakness, abdominal pain, or urinary symptoms.        PAST MEDICAL & SURGICAL HISTORY:  Epilepsy  Left testicular torsion        FAMILY HISTORY:  Family history of seizures (Child)  Maternal Aunt          Social History:  Lives with mother  Not employed  + marijuana (buying off the street)        Risk factors:  Born with no complications  Normal growth and development  No febrile seizures/CNS infections  No head trauma with loss of consciousness      Allergy:  Lamictal rash)        Home Medications:  Aptiom 1400 orally once a day (at bedtime)   KlonoPIN 0.5 mg oral tablet: 1 tab(s) orally 2 times a day (02 Dec 2020 21:57)  levETIRAcetam 500 mg oral tablet: 1 tab(s) orally every 12 hours (01 Mar 2020 10:51)  zonisamide 100 mg oral capsule: 3 cap(s) orally once a day (at bedtime)         MEDICATIONS  (STANDING):  chlorhexidine 4% Liquid 1 Application(s) Topical <User Schedule>  clonazePAM  Tablet 0.5 milliGRAM(s) Oral two times a day  enoxaparin Injectable 40 milliGRAM(s) SubCutaneous daily  eslicarbazepine 1400 milliGRAM(s) Oral <User Schedule>  levETIRAcetam 500 milliGRAM(s) Oral <User Schedule>  zonisamide 300 milliGRAM(s) Oral <User Schedule>    MEDICATIONS  (PRN):  LORazepam   Injectable 2 milliGRAM(s) IV Push three times a day PRN generalized tonic-clonic seizure lasting longer than 2 minutes, or two consecutive seizures iwthout return to baseline in-between        Review of systems:    Constitutional: No fever, weight loss or fatigue    Eyes: No eye pain or discharge  ENMT:  No difficulty hearing; No sinus or throat pain  Neck: No pain or stiffness  Respiratory: No cough, wheezing, chills or hemoptysis  Cardiovascular: No chest pain, palpitations, shortness of breath, dyspnea on exertion  Gastrointestinal: No abdominal pain, nausea, vomiting or hematemesis; No diarrhea or constipation.   Genitourinary: No dysuria, frequency, hematuria or incontinence  Neurological: As per HPI  Skin: No rashes or lesions   Endocrine: No heat or cold intolerance; No hair loss  Musculoskeletal: No joint pain or swelling  Psychiatric: No depression, anxiety, mood swings  Heme/Lymph: No easy bruising or bleeding gums        T(F): 97.3 (20 @ 05:47), Max: 99 (20 @ 15:29)  HR: 75 (20 @ 05:47) (70 - 96)  BP: 113/63 (20 @ 05:47) (101/56 - 147/75)  RR: 18 (20 @ 05:47) (16 - 20)  SpO2: 100% (20 @ 16:03) (99% - 100%)        Neurologic Examination:  General:  Appearance is consistent with chronologic age.  No abnormal facies.   General: The patient is oriented to person, place, time and date.  Recent and remote memory intact.  Follows 4-step directions. Fund of knowledge is intact and normal.  Language with normal repetition, comprehension and naming.  Nondysarthric.    Cranial nerves: EOMI w/o nystagmus, skew or reported double vision.  PERRL.  No ptosis/weakness of eyelid closure.  Facial sensation is normal with normal bite.  No facial asymmetry.  Hearing grossly intact b/l.  Palate elevates midline.  Tongue midline.  Motor examination:   Normal tone, bulk and range of motion.  No tenderness, twitching, tremors or involuntary movements.  Formal Muscle Strength Testin/5 UE; 5/5 LE.  No observable drift.  Reflexes:   2+ b/l pectoralis, biceps, triceps, brachioradialis, patella and Achilles.  Plantar response downgoing b/l.  Jaw jerk, Basim, clonus absent.  Sensory examination:   Intact to light touch and pinprick, pain, temperature and proprioception and vibration in all extremities.  Cerebellum:   FTN/HKS intact with normal ELDON in all limbs.  No dysmetria or dysdiadokinesia.  Gait narrow based and normal.        Labs:                         14.0   6.25  )-----------( 203      ( 02 Dec 2020 11:10 )             41.3     12-    139  |  102  |  12  ----------------------------<  118<H>  4.4   |  27  |  1.1    Ca    9.4      02 Dec 2020 11:10    TPro  6.8  /  Alb  4.8  /  TBili  0.3  /  DBili  x   /  AST  8   /  ALT  11  /  AlkPhos  100  12    LIVER FUNCTIONS - ( 02 Dec 2020 11:10 )  Alb: 4.8 g/dL / Pro: 6.8 g/dL / ALK PHOS: 100 U/L / ALT: 11 U/L / AST: 8 U/L / GGT: x             SARS-CoV-2: NotDetec (20 @ 12:18)        Neuroimaging:  CT Head: CT Head No Cont:   EXAM:  CT BRAIN            PROCEDURE DATE:  2020      INTERPRETATION:  CLINICAL INDICATION: Seizure    TECHNIQUE: CT of the head was performed without the administration of intravenous contrast.    COMPARISON: CT of the head dated 2019    FINDINGS:    There is no evidence of acute territorial/transcortical infarction or intracranial hemorrhage. There is no space-occupying lesion or midline shift.    There is no evidence of hydrocephalus. There are no extra-axial fluid collections.    The visualized intraorbital contents are normal. There is mild mucosal thickening in scattered portions of the visualized paranasal sinuses. TheThe mastoid air cells are aerated. The visualized soft tissues and osseous structures appear normal. Partially visualized parotid glands are normal.    IMPRESSION:    No acute intracranial pathology, stable since 2019.    RAHEEM PEDERSON M.D., ATTENDING RADIOLOGIST  This document has been electronically signed. Dec  2 2020  1:25PM (20 @ 13:11)        VEEG 3/1/2020 at [facility]        < from: EEG Monitoring Each 24 hours (19 @ 08:20) >  Focal Slowing:  Right temporal mild slowing    Generalized Slowing:  None    Interictal Activity  None    Activation and Provocation Procedures:  None performed    Events:  No events captured      Impression  This is an  abnormal Video EEG study with right temporal focal slowing   and no epileptiform discharges. None of the patient's typical events were   captured precluding direct electro-clinical correlation.    < end of copied text >      EEG:     < from: EEG Monitoring Each 24 hours (18 @ 14:22) >  FOCAL SLOWING  Borderline to mild right hemispheric focal slowing mainly over mid to   posterior temporal region      BREACH ARTIFACT  No breach artifact    HV/PS  Not performed     Sleep Deprivation and medication taper  Was not performed     EPILEPTIFORM ACTIVITY  Large number  right anterior temporal sharps and is spikes with phase   reversal at T2 and T4 contact points    EVENTS  During this session of the monitoring patient had 2 partial seizures from   the right hemisphere-right frontotemporal region each episode lasted for   3 to 4 minutes clinically both very characterized by behavioral arrest   followed by Jaskaran and oral automatism electrographically the seizures   presented as rhythmic low theta and data activity from the right   frontotemporal region    IMPRESSIONS  Abnormal video EEG due to the presence of  1-focal slowing as described above   2-abnormal epileptiform activity as described above  3-two Partial seizures from the right hemisphere as described above    CLINICAL CORRELATION  Consistent feet right hemispheric focal electrophysiological dysfunction   and right hemispheric partial epilepsy clinical correlation is   recommended     < end of copied text >    Assessment:  This is a 28y Male with h/o right temporal lobe epilepsy, s/p multiple breakthrough seizures.         Discussed with Dr. Duong      Plan:   - VEEG monitoring for further characterization and assessment, possible medication adjustment  - Seizure precautions  - Ativan 2mg IV PRN for generalized tonic-clonic seizure lasting longer than 2 minutes, or two consecutive seizures without return to baseline in-between (ordered)  - CBC, CMP, Mg, CK, AED levels trough (ordered)  - Keep Mg above 2    Plan discussed with patient in details, all questions answered.    Discussed with hospitalist and PA. Neurology/Epilepsy Consult:    CHARLINE CHAMBERS 28y Male  MRN-[id number]    Patient is a 28y old right-handed Male who presents with a chief complaint of multiple seizures        HPI: History obtained from patient and mother. EMR and outpatient records reviewed  Patient was brought to ED yesterday morning for evaluation s/p multiple seizures at home witnessed by mother. Patient reportedly had 5-6 episodes of unresponsiveness associated with jerking movements of extremities and followed by confusion. Each episode lasted 1-2 minutes. Patient was not back to baseline upon EMS arrival and was given Valium 5mg. Patient had several more 10 sec episodes described as "seizure-like activity" while in the ED and was given Ativan 2mg x 1. No events reported by nursing overnight.  Patient cannot describe his seizures, but recalls not being able to sleep the night prior to admission due to seizures. He mentioned running very low on Klonopin, therefore taking half of his usual dose for several days prior admission. In addition, the day prior to this admission patient took his morning medications much later that usual, and ended up taking evening medications shortly after that. He denies missing any doses.  Patient reports first ever witnessed seizure was in . Some of his prior seizure described by mother were GTC events lasting up to 15 minutes. He also had lapses in memory associated with excessive drooling.  He had multiple hospitalization over the years in [place] (?[place] or [facility]). Patient was evaluated at [facility] multiple times, was admitted to EMU before. On several occasions he signed out AMA. He had various neurologists over time, currently he is being followed at [facility] by Dr. Gordon, last office visit 2020.   He had 2 more in that same year and started seeing a neurologist at that time. Patient cannot describe his seizures. Pt endorses biting tongue and urinary incontinence sometimes during seizures. Is postictal afterwards. Describes his auras as sweating in the hands as well as intense anxiety. Reports that he will have events at work where he will have "spaced out" and will find himself having drooled onto his desk and a long period of time had passed. Also has happened while he was waiting for the bus recently. Most recent seizure was one week ago where he had the drooling episode. One prior to that was January.  Thinks that bright lights trigger seizures. Reports that marijuana helps get rid of his auras. When he wakes up with aura, he smokes marijuana to help ease himself and they go away. Reaction to lamictal--throat swells up. Denies any other symptoms--denies chest pain, shortness of breath, vision changes, numbness/tingling, weakness, abdominal pain, or urinary symptoms.          PAST MEDICAL & SURGICAL HISTORY:  Epilepsy  Left testicular torsion        FAMILY HISTORY:  Family history of seizures (Child)  Maternal Aunt          Social History:  Lives with mother  Not employed  + marijuana (buying off the street)        Risk factors:  Born with no complications  Normal growth and development  No febrile seizures/CNS infections  No head trauma with loss of consciousness      Allergy:  Lamictal rash)        Home Medications:  Aptiom 1400 orally once a day (at bedtime)   KlonoPIN 0.5 mg oral tablet: 1 tab(s) orally 2 times a day (02 Dec 2020 21:57)  levETIRAcetam 500 mg oral tablet: 1 tab(s) orally every 12 hours (01 Mar 2020 10:51)  zonisamide 100 mg oral capsule: 3 cap(s) orally once a day (at bedtime)         MEDICATIONS  (STANDING):  chlorhexidine 4% Liquid 1 Application(s) Topical <User Schedule>  clonazePAM  Tablet 0.5 milliGRAM(s) Oral two times a day  enoxaparin Injectable 40 milliGRAM(s) SubCutaneous daily  eslicarbazepine 1400 milliGRAM(s) Oral <User Schedule>  levETIRAcetam 500 milliGRAM(s) Oral <User Schedule>  zonisamide 300 milliGRAM(s) Oral <User Schedule>    MEDICATIONS  (PRN):  LORazepam   Injectable 2 milliGRAM(s) IV Push three times a day PRN generalized tonic-clonic seizure lasting longer than 2 minutes, or two consecutive seizures iwthout return to baseline in-between        Review of systems:    Constitutional: No fever, weight loss or fatigue    Eyes: No eye pain or discharge  ENMT:  No difficulty hearing; No sinus or throat pain  Neck: No pain or stiffness  Respiratory: No cough, wheezing, chills or hemoptysis  Cardiovascular: No chest pain, palpitations, shortness of breath, dyspnea on exertion  Gastrointestinal: No abdominal pain, nausea, vomiting or hematemesis; No diarrhea or constipation.   Genitourinary: No dysuria, frequency, hematuria or incontinence  Neurological: As per HPI  Skin: No rashes or lesions   Endocrine: No heat or cold intolerance; No hair loss  Musculoskeletal: No joint pain or swelling  Psychiatric: No depression, anxiety, mood swings  Heme/Lymph: No easy bruising or bleeding gums        T(F): 97.3 (20 @ 05:47), Max: 99 (20 @ 15:29)  HR: 75 (20 @ 05:47) (70 - 96)  BP: 113/63 (20 @ 05:47) (101/56 - 147/75)  RR: 18 (20 @ 05:47) (16 - 20)  SpO2: 100% (20 @ 16:03) (99% - 100%)        Neurologic Examination:  General:  Appearance is consistent with chronologic age.  No abnormal facies.   General: The patient is oriented to person, place, time and date.  Recent and remote memory intact.  Follows 4-step directions. Fund of knowledge is intact and normal.  Language with normal repetition, comprehension and naming.  Nondysarthric.    Cranial nerves: EOMI w/o nystagmus, skew or reported double vision.  PERRL.  No ptosis/weakness of eyelid closure.  Facial sensation is normal with normal bite.  No facial asymmetry.  Hearing grossly intact b/l.  Palate elevates midline.  Tongue midline.  Motor examination:   Normal tone, bulk and range of motion.  No tenderness, twitching, tremors or involuntary movements.  Formal Muscle Strength Testin/5 UE; 5/5 LE.  No observable drift.  Reflexes:   2+ b/l pectoralis, biceps, triceps, brachioradialis, patella and Achilles.  Plantar response downgoing b/l.  Jaw jerk, Basim, clonus absent.  Sensory examination:   Intact to light touch and pinprick, pain, temperature and proprioception and vibration in all extremities.  Cerebellum:   FTN/HKS intact with normal ELDON in all limbs.  No dysmetria or dysdiadokinesia.  Gait narrow based and normal.        Labs:                         14.0   6.25  )-----------( 203      ( 02 Dec 2020 11:10 )             41.3         139  |  102  |  12  ----------------------------<  118<H>  4.4   |  27  |  1.1    Ca    9.4      02 Dec 2020 11:10    TPro  6.8  /  Alb  4.8  /  TBili  0.3  /  DBili  x   /  AST  8   /  ALT  11  /  AlkPhos  100  12-02    LIVER FUNCTIONS - ( 02 Dec 2020 11:10 )  Alb: 4.8 g/dL / Pro: 6.8 g/dL / ALK PHOS: 100 U/L / ALT: 11 U/L / AST: 8 U/L / GGT: x             SARS-CoV-2: NotDetec (20 @ 12:18)        Neuroimaging:  CT Head: CT Head No Cont:   EXAM:  CT BRAIN            PROCEDURE DATE:  2020      INTERPRETATION:  CLINICAL INDICATION: Seizure    TECHNIQUE: CT of the head was performed without the administration of intravenous contrast.    COMPARISON: CT of the head dated 2019    FINDINGS:    There is no evidence of acute territorial/transcortical infarction or intracranial hemorrhage. There is no space-occupying lesion or midline shift.    There is no evidence of hydrocephalus. There are no extra-axial fluid collections.    The visualized intraorbital contents are normal. There is mild mucosal thickening in scattered portions of the visualized paranasal sinuses. TheThe mastoid air cells are aerated. The visualized soft tissues and osseous structures appear normal. Partially visualized parotid glands are normal.    IMPRESSION:    No acute intracranial pathology, stable since 2019.    RAHEEM PEDERSON M.D., ATTENDING RADIOLOGIST  This document has been electronically signed. Dec  2 2020  1:25PM (20 @ 13:11)        VEEG 3/1/2020 at [facility]        < from: EEG Monitoring Each 24 hours (19 @ 08:20) >  Focal Slowing:  Right temporal mild slowing    Generalized Slowing:  None    Interictal Activity  None    Activation and Provocation Procedures:  None performed    Events:  No events captured      Impression  This is an  abnormal Video EEG study with right temporal focal slowing   and no epileptiform discharges. None of the patient's typical events were   captured precluding direct electro-clinical correlation.    < end of copied text >      EEG:     < from: EEG Monitoring Each 24 hours (18 @ 14:22) >  FOCAL SLOWING  Borderline to mild right hemispheric focal slowing mainly over mid to   posterior temporal region      BREACH ARTIFACT  No breach artifact    HV/PS  Not performed     Sleep Deprivation and medication taper  Was not performed     EPILEPTIFORM ACTIVITY  Large number  right anterior temporal sharps and is spikes with phase   reversal at T2 and T4 contact points    EVENTS  During this session of the monitoring patient had 2 partial seizures from   the right hemisphere-right frontotemporal region each episode lasted for   3 to 4 minutes clinically both very characterized by behavioral arrest   followed by Jaskaran and oral automatism electrographically the seizures   presented as rhythmic low theta and data activity from the right   frontotemporal region    IMPRESSIONS  Abnormal video EEG due to the presence of  1-focal slowing as described above   2-abnormal epileptiform activity as described above  3-two Partial seizures from the right hemisphere as described above    CLINICAL CORRELATION  Consistent feet right hemispheric focal electrophysiological dysfunction   and right hemispheric partial epilepsy clinical correlation is   recommended     < end of copied text >    Assessment:  This is a 28y Male with h/o right temporal lobe epilepsy, s/p multiple breakthrough seizures.         Discussed with Dr. Duong      Plan:   - VEEG monitoring for further characterization and assessment, possible medication adjustment  - Seizure precautions  - Ativan 2mg IV PRN for generalized tonic-clonic seizure lasting longer than 2 minutes, or two consecutive seizures without return to baseline in-between (ordered)  - CBC, CMP, Mg, CK, AED levels trough (ordered)  - Keep Mg above 2    Plan discussed with patient in details, all questions answered.    Discussed with hospitalist and PA. Neurology/Epilepsy Consult:    CHARLINE CHAMBERS 28y Male  MRN-[id number]    Patient is a 28y old right-handed Male who presents with a chief complaint of multiple seizures        HPI: History obtained from patient and mother. EMR and outpatient records reviewed. Patient is not a good historian.  Patient was brought to ED yesterday morning for evaluation s/p multiple seizures at home witnessed by mother. Patient reportedly had 5-6 episodes of unresponsiveness associated with jerking movements of extremities and followed by confusion. Each episode lasted 1-2 minutes. No incontinence, no tongue bite, no Cameron's paralysis reported. Patient was not back to baseline upon EMS arrival and was given Valium 5mg. Patient had several more 10 sec episodes described as "seizure-like activity" while in the ED and vomited, was given Ativan 2mg x 1. No events reported by nursing overnight. He had a single episode of vomiting this morning.  Patient cannot describe his seizures, but recalls not being able to sleep the night prior to admission due to seizures. He mentioned running very low on Klonopin, therefore was taking half of his usual dose for several days prior admission. In addition, the day prior to this admission patient took his morning medications much later that usual, and ended up taking evening medications shortly after that. He denies missing any doses.  Patient reports first ever witnessed seizure was in . Some of his prior seizures described by mother were GTC events lasting up to 15 minutes. He also had lapses in memory associated with excessive drooling.  He had multiple hospitalization over the years in [place] (?[place] or [facility]). Patient was evaluated at [facility] multiple times, was admitted to EMU before (see reports of EEGS). On several occasions he signed out AMA. He had various neurologists over time, currently he is being followed at [facility] by Dr. Gordon, last office visit 2020.   Patient was admitted to [facility] in 2020 for elective VEEG monitoring and his medications were adjusted. His Zonegran dose was further increased since then, most recently in 2020. Patient and mother reported better seizure control since then, but patient continues having occasional breakthrough events, though it is likely not all of them are being captured. His prior seizure was few weeks ago, but neither patient not mom recall what happened.   Patient was Rx medical marijuana in the past, but does not find it effective and does not like how it makes him feel. Instead, he buy marijuana off the street and states it works very well.  Patient had MRI previously, but it is not clear where. No records found at [facility]. Patient's mother believes it was done somewhere in [place] last year.        PAST MEDICAL & SURGICAL HISTORY:  Epilepsy  Left testicular torsion        FAMILY HISTORY:  Maternal Aunt - seizures        Social History:  Lives with mother  Not employed  + marijuana         Risk factors:  Born with no complications  Normal growth and development  No febrile seizures/CNS infections  No head trauma with loss of consciousness      Allergy:  Lamictal rash)        Home Medications:  Aptiom 1400 orally once a day (at bedtime)   KlonoPIN 0.5 mg oral tablet: 1 tab(s) orally 2 times a day (02 Dec 2020 21:57)  levETIRAcetam 500 mg oral tablet: 1 tab(s) orally every 12 hours (01 Mar 2020 10:51)  zonisamide 100 mg oral capsule: 3 cap(s) orally once a day (at bedtime)         MEDICATIONS  (STANDING):  chlorhexidine 4% Liquid 1 Application(s) Topical <User Schedule>  clonazePAM  Tablet 0.5 milliGRAM(s) Oral two times a day  enoxaparin Injectable 40 milliGRAM(s) SubCutaneous daily  eslicarbazepine 1400 milliGRAM(s) Oral <User Schedule>  levETIRAcetam 500 milliGRAM(s) Oral <User Schedule>  zonisamide 300 milliGRAM(s) Oral <User Schedule>    MEDICATIONS  (PRN):  LORazepam   Injectable 2 milliGRAM(s) IV Push three times a day PRN generalized tonic-clonic seizure lasting longer than 2 minutes, or two consecutive seizures without return to baseline in-between      T(F): 97.3 (20 @ 05:47), Max: 99 (20 @ 15:29)  HR: 75 (20 @ 05:47) (70 - 96)  BP: 113/63 (20 @ 05:47) (101/56 - 147/75)  RR: 18 (20 @ 05:47) (16 - 20)  SpO2: 100% (20 @ 16:03) (99% - 100%)        Neurologic Examination:  General:  Appearance is consistent with chronologic age.  No abnormal facies. Flat affect.  General: The patient is oriented to person, place, time and date.  Follows 4-step directions. Language is slow with normal repetition, comprehension and naming.  Nondysarthric.    Cranial nerves: EOMI w/o nystagmus, skew or reported double vision.  PERRL.  No ptosis/weakness of eyelid closure.  Facial sensation is normal with normal bite.  No facial asymmetry.  Hearing grossly intact b/l.  Palate elevates midline.  Tongue midline.  Motor examination:   Normal tone, bulk and range of motion.  No tenderness, twitching, tremors or involuntary movements.  Formal Muscle Strength Testin/5 UE; 5/5 LE.  No observable drift.  Reflexes:   2+ b/l   Sensory examination:   Intact to light touch and pinprick, pain, temperature.  Cerebellum:   FTN/HKS intact with normal ELDON in all limbs.  No dysmetria or dysdiadokinesia.  Gait deferred.        Labs:                         14.0   6.25  )-----------( 203      ( 02 Dec 2020 11:10 )             41.3     12    139  |  102  |  12  ----------------------------<  118<H>  4.4   |  27  |  1.1    Ca    9.4      02 Dec 2020 11:10    TPro  6.8  /  Alb  4.8  /  TBili  0.3  /  DBili  x   /  AST  8   /  ALT  11  /  AlkPhos  100  12-    LIVER FUNCTIONS - ( 02 Dec 2020 11:10 )  Alb: 4.8 g/dL / Pro: 6.8 g/dL / ALK PHOS: 100 U/L / ALT: 11 U/L / AST: 8 U/L / GGT: x             SARS-CoV-2: NotDetec (20 @ 12:18)          Neuroimaging:  CT Head: CT Head No Cont:   EXAM:  CT BRAIN          PROCEDURE DATE:  2020      INTERPRETATION:  CLINICAL INDICATION: Seizure    TECHNIQUE: CT of the head was performed without the administration of intravenous contrast.    COMPARISON: CT of the head dated 2019    FINDINGS:    There is no evidence of acute territorial/transcortical infarction or intracranial hemorrhage. There is no space-occupying lesion or midline shift.    There is no evidence of hydrocephalus. There are no extra-axial fluid collections.    The visualized intraorbital contents are normal. There is mild mucosal thickening in scattered portions of the visualized paranasal sinuses. The mastoid air cells are aerated. The visualized soft tissues and osseous structures appear normal. Partially visualized parotid glands are normal.    IMPRESSION:    No acute intracranial pathology, stable since 2019.    RAHEEM PEDERSON M.D., ATTENDING RADIOLOGIST  This document has been electronically signed. Dec  2 2020  1:25PM (20 @ 13:11)        VEEG  - 3/1/2020 at [facility] - right posterior temporal seizures      VEEG 3/22 - 3/23/2019:  < from: EEG Monitoring Each 24 hours (19 @ 08:20) >  Focal Slowing:  Right temporal mild slowing    Generalized Slowing:  None    Interictal Activity  None    Activation and Provocation Procedures:  None performed    Events:  No events captured      Impression  This is an  abnormal Video EEG study with right temporal focal slowing   and no epileptiform discharges. None of the patient's typical events were   captured precluding direct electro-clinical correlation.    < end of copied text >        VEEH  - 2018:  < from: EEG Monitoring Each 24 hours (18 @ 14:22) >  FOCAL SLOWING  Borderline to mild right hemispheric focal slowing mainly over mid to   posterior temporal region      BREACH ARTIFACT  No breach artifact    HV/PS  Not performed     Sleep Deprivation and medication taper  Was not performed     EPILEPTIFORM ACTIVITY  Large number  right anterior temporal sharps and is spikes with phase   reversal at T2 and T4 contact points    EVENTS  During this session of the monitoring patient had 2 partial seizures from   the right hemisphere-right frontotemporal region each episode lasted for   3 to 4 minutes clinically both very characterized by behavioral arrest   followed by Jaskaran and oral automatism electrographically the seizures   presented as rhythmic low theta and data activity from the right   frontotemporal region    IMPRESSIONS  Abnormal video EEG due to the presence of  1-focal slowing as described above   2-abnormal epileptiform activity as described above  3-two Partial seizures from the right hemisphere as described above    CLINICAL CORRELATION  Consistent feet right hemispheric focal electrophysiological dysfunction   and right hemispheric partial epilepsy clinical correlation is   recommended     < end of copied text >        Assessment:  This is a 28y Male with h/o right temporal lobe epilepsy, s/p multiple breakthrough seizures. Patient is being followed by Dr. Gordon at [facility].      Discussed with Dr. Duong      Plan:   - VEEG monitoring for further characterization and assessment, possible medication adjustment (started this morning)  - Seizure precautions  - Continue home doses of AED (doses were verified, ordered, supplemental doses given for amounts missed yesterday)  - Ativan 2mg IV PRN for generalized tonic-clonic seizure lasting longer than 2 minutes, or two consecutive seizures without return to baseline in-between (ordered)  - CBC, CMP, Mg, CK, AED levels trough   - Keep Mg above 2    Plan discussed with patient and mother in details, all questions answered.    Discussed with nursing team and PA. Neurology/Epilepsy Consult:    CHARLNIE CHAMBERS 28y Male  MRN-[id number]    Patient is a 28y old right-handed Male who presents with a chief complaint of multiple seizures        HPI: History obtained from patient and mother. EMR and outpatient records reviewed. Patient is not a good historian.  Patient was brought to ED yesterday morning for evaluation s/p multiple seizures at home witnessed by mother. Patient reportedly had 5-6 episodes of unresponsiveness associated with jerking movements of extremities and followed by confusion. Each episode lasted 1-2 minutes. No incontinence, no tongue bite, no Cameron's paralysis reported. Patient was not back to baseline upon EMS arrival and was given Valium 5mg. Patient had several more 10 sec episodes described as "seizure-like activity" while in the ED and vomited, was given Ativan 2mg x 1. No events reported by nursing overnight. He had a single episode of vomiting this morning.  Patient cannot describe his seizures, but recalls not being able to sleep the night prior to admission due to seizures. He mentioned running very low on Klonopin, therefore was taking half of his usual dose for several days prior admission. In addition, the day prior to this admission patient took his morning medications much later that usual, and ended up taking evening medications shortly after that. He denies missing any doses.  Patient reports first ever witnessed seizure was in . Some of his prior seizures described by mother were GTC events lasting up to 15 minutes. He also had lapses in memory associated with excessive drooling.  He had multiple hospitalization over the years in [place] (?[place] or [facility]). Patient was evaluated at [facility] multiple times, was admitted to EMU before (see reports of EEGS). On several occasions he signed out AMA. He had various neurologists over time, currently he is being followed at [facility] by Dr. Gordon, last office visit 2020.   Patient was admitted to [facility] in 2020 for elective VEEG monitoring and his medications were adjusted. His Zonegran dose was further increased since then, most recently in 2020. Patient and mother reported better seizure control since then, but patient continues having occasional breakthrough events, though it is likely not all of them are being captured. His prior seizure was few weeks ago, but neither patient not mom recall what happened.   Patient was Rx medical marijuana in the past, but does not find it effective and does not like how it makes him feel. Instead, he buy marijuana off the street and states it works very well.  Patient had MRI previously, but it is not clear where. No records found at [facility]. Patient's mother believes it was done somewhere in [place] last year.        PAST MEDICAL & SURGICAL HISTORY:  Epilepsy  Left testicular torsion        FAMILY HISTORY:  Maternal Aunt - seizures        Social History:  Lives with mother  Not employed  + marijuana         Risk factors:  Born with no complications  Normal growth and development  No febrile seizures/CNS infections  No head trauma with loss of consciousness      Allergy:  Lamictal rash)        Home Medications:  Aptiom 1400 orally once a day (at bedtime)   KlonoPIN 0.5 mg oral tablet: 1 tab(s) orally 2 times a day (02 Dec 2020 21:57)  levETIRAcetam 500 mg oral tablet: 1 tab(s) orally every 12 hours (01 Mar 2020 10:51)  zonisamide 100 mg oral capsule: 3 cap(s) orally once a day (at bedtime)         MEDICATIONS  (STANDING):  chlorhexidine 4% Liquid 1 Application(s) Topical <User Schedule>  clonazePAM  Tablet 0.5 milliGRAM(s) Oral two times a day  enoxaparin Injectable 40 milliGRAM(s) SubCutaneous daily  eslicarbazepine 1400 milliGRAM(s) Oral <User Schedule>  levETIRAcetam 500 milliGRAM(s) Oral <User Schedule>  zonisamide 300 milliGRAM(s) Oral <User Schedule>    MEDICATIONS  (PRN):  LORazepam   Injectable 2 milliGRAM(s) IV Push three times a day PRN generalized tonic-clonic seizure lasting longer than 2 minutes, or two consecutive seizures without return to baseline in-between      T(F): 97.3 (20 @ 05:47), Max: 99 (20 @ 15:29)  HR: 75 (20 @ 05:47) (70 - 96)  BP: 113/63 (20 @ 05:47) (101/56 - 147/75)  RR: 18 (20 @ 05:47) (16 - 20)  SpO2: 100% (20 @ 16:03) (99% - 100%)        Neurologic Examination:  General:  Appearance is consistent with chronologic age.  No abnormal facies. Flat affect.  General: The patient is oriented to person, place, time and date.  Follows 4-step directions. Language is slow with normal repetition, comprehension and naming.  Nondysarthric.    Cranial nerves: EOMI w/o nystagmus, skew or reported double vision.  PERRL.  No ptosis/weakness of eyelid closure.  Facial sensation is normal with normal bite.  No facial asymmetry.  Hearing grossly intact b/l.  Palate elevates midline.  Tongue midline.  Motor examination:   Normal tone, bulk and range of motion.  No tenderness, twitching, tremors or involuntary movements.  Formal Muscle Strength Testin/5 UE; 5/5 LE.  No observable drift.  Reflexes:   2+ b/l   Sensory examination:   Intact to light touch and pinprick, pain, temperature.  Cerebellum:   FTN/HKS intact with normal ELDON in all limbs.  No dysmetria or dysdiadokinesia.  Gait deferred.        Labs:                         14.0   6.25  )-----------( 203      ( 02 Dec 2020 11:10 )             41.3     12    139  |  102  |  12  ----------------------------<  118<H>  4.4   |  27  |  1.1    Ca    9.4      02 Dec 2020 11:10    TPro  6.8  /  Alb  4.8  /  TBili  0.3  /  DBili  x   /  AST  8   /  ALT  11  /  AlkPhos  100  12-    LIVER FUNCTIONS - ( 02 Dec 2020 11:10 )  Alb: 4.8 g/dL / Pro: 6.8 g/dL / ALK PHOS: 100 U/L / ALT: 11 U/L / AST: 8 U/L / GGT: x             SARS-CoV-2: NotDetec (20 @ 12:18)      2020 Oxcarbazepine 23 mcg/ml, Keppra 13.9 mcg/ml, Zonisamide 11 mcg/ml        Neuroimaging:  CT Head: CT Head No Cont:   EXAM:  CT BRAIN          PROCEDURE DATE:  2020      INTERPRETATION:  CLINICAL INDICATION: Seizure    TECHNIQUE: CT of the head was performed without the administration of intravenous contrast.    COMPARISON: CT of the head dated 2019    FINDINGS:    There is no evidence of acute territorial/transcortical infarction or intracranial hemorrhage. There is no space-occupying lesion or midline shift.    There is no evidence of hydrocephalus. There are no extra-axial fluid collections.    The visualized intraorbital contents are normal. There is mild mucosal thickening in scattered portions of the visualized paranasal sinuses. The mastoid air cells are aerated. The visualized soft tissues and osseous structures appear normal. Partially visualized parotid glands are normal.    IMPRESSION:    No acute intracranial pathology, stable since 2019.    RAHEEM PDEERSON M.D., ATTENDING RADIOLOGIST  This document has been electronically signed. Dec  2 2020  1:25PM (20 @ 13:11)        VEEG  - 3/1/2020 at [facility] - right posterior temporal seizures      VEEG 3/22 - 3/23/2019:  < from: EEG Monitoring Each 24 hours (19 @ 08:20) >  Focal Slowing:  Right temporal mild slowing    Generalized Slowing:  None    Interictal Activity  None    Activation and Provocation Procedures:  None performed    Events:  No events captured      Impression  This is an  abnormal Video EEG study with right temporal focal slowing   and no epileptiform discharges. None of the patient's typical events were   captured precluding direct electro-clinical correlation.    < end of copied text >        VEEH  - 2018:  < from: EEG Monitoring Each 24 hours (18 @ 14:22) >  FOCAL SLOWING  Borderline to mild right hemispheric focal slowing mainly over mid to   posterior temporal region      BREACH ARTIFACT  No breach artifact    HV/PS  Not performed     Sleep Deprivation and medication taper  Was not performed     EPILEPTIFORM ACTIVITY  Large number  right anterior temporal sharps and is spikes with phase   reversal at T2 and T4 contact points    EVENTS  During this session of the monitoring patient had 2 partial seizures from   the right hemisphere-right frontotemporal region each episode lasted for   3 to 4 minutes clinically both very characterized by behavioral arrest   followed by Jaskaran and oral automatism electrographically the seizures   presented as rhythmic low theta and data activity from the right   frontotemporal region    IMPRESSIONS  Abnormal video EEG due to the presence of  1-focal slowing as described above   2-abnormal epileptiform activity as described above  3-two Partial seizures from the right hemisphere as described above    CLINICAL CORRELATION  Consistent feet right hemispheric focal electrophysiological dysfunction   and right hemispheric partial epilepsy clinical correlation is   recommended     < end of copied text >        Assessment:  This is a 28y Male with h/o right temporal lobe epilepsy, s/p multiple breakthrough seizures yesterday. There were some irregularities in the way he was taking AEDs recently. Patient is being followed by Dr. Gordon at [facility].      Discussed with Dr. Duong      Plan:   - VEEG monitoring for further characterization and assessment, possible medication adjustment (started this morning)  - Seizure precautions  - Continue home doses of AED (doses were verified, ordered, supplemental doses given for amounts missed yesterday)  - Ativan 2mg IV PRN for generalized tonic-clonic seizure lasting longer than 2 minutes, or two consecutive seizures without return to baseline in-between (ordered)  - CBC, CMP, Mg, CK, AED levels trough   - Keep Mg above 2    Plan discussed with patient and mother in details, all questions answered.    Discussed with nursing team and PA.

## 2023-10-09 ENCOUNTER — OFFICE VISIT (OUTPATIENT)
Dept: ENT CLINIC | Age: 58
End: 2023-10-09
Payer: COMMERCIAL

## 2023-10-09 ENCOUNTER — PROCEDURE VISIT (OUTPATIENT)
Dept: AUDIOLOGY | Age: 58
End: 2023-10-09
Payer: COMMERCIAL

## 2023-10-09 VITALS
SYSTOLIC BLOOD PRESSURE: 132 MMHG | BODY MASS INDEX: 27.94 KG/M2 | WEIGHT: 178 LBS | TEMPERATURE: 97.8 F | OXYGEN SATURATION: 98 % | HEIGHT: 67 IN | RESPIRATION RATE: 16 BRPM | DIASTOLIC BLOOD PRESSURE: 89 MMHG | HEART RATE: 65 BPM

## 2023-10-09 DIAGNOSIS — H91.90 HEARING LOSS, UNSPECIFIED HEARING LOSS TYPE, UNSPECIFIED LATERALITY: Primary | ICD-10-CM

## 2023-10-09 DIAGNOSIS — H93.13 TINNITUS OF BOTH EARS: Primary | ICD-10-CM

## 2023-10-09 DIAGNOSIS — H81.11 BENIGN PAROXYSMAL POSITIONAL VERTIGO OF RIGHT EAR: Primary | ICD-10-CM

## 2023-10-09 DIAGNOSIS — R42 DIZZINESS: ICD-10-CM

## 2023-10-09 DIAGNOSIS — S09.93XA FACIAL INJURY, INITIAL ENCOUNTER: ICD-10-CM

## 2023-10-09 PROCEDURE — 99213 OFFICE O/P EST LOW 20 MIN: CPT | Performed by: OTOLARYNGOLOGY

## 2023-10-09 PROCEDURE — G8417 CALC BMI ABV UP PARAM F/U: HCPCS | Performed by: OTOLARYNGOLOGY

## 2023-10-09 PROCEDURE — G8484 FLU IMMUNIZE NO ADMIN: HCPCS | Performed by: OTOLARYNGOLOGY

## 2023-10-09 PROCEDURE — 92567 TYMPANOMETRY: CPT | Performed by: AUDIOLOGIST

## 2023-10-09 PROCEDURE — 3079F DIAST BP 80-89 MM HG: CPT | Performed by: OTOLARYNGOLOGY

## 2023-10-09 PROCEDURE — 3075F SYST BP GE 130 - 139MM HG: CPT | Performed by: OTOLARYNGOLOGY

## 2023-10-09 PROCEDURE — G8427 DOCREV CUR MEDS BY ELIG CLIN: HCPCS | Performed by: OTOLARYNGOLOGY

## 2023-10-09 PROCEDURE — 3017F COLORECTAL CA SCREEN DOC REV: CPT | Performed by: OTOLARYNGOLOGY

## 2023-10-09 PROCEDURE — 1036F TOBACCO NON-USER: CPT | Performed by: OTOLARYNGOLOGY

## 2023-10-09 PROCEDURE — 92557 COMPREHENSIVE HEARING TEST: CPT | Performed by: AUDIOLOGIST

## 2023-10-09 NOTE — PATIENT INSTRUCTIONS
aids. They make a sound that masks, or covers up, the tinnitus. The masking sound distracts you from the ringing in your ears. You may be able to use a masker and a hearing aid at the same time. Sound machines can be useful at night or during quiet times. There are machines you can buy at the store. Or, you can find apps on your phone that make sounds, like the ocean or rainfall. Fish tanks, fans, quiet music, and indoor waterfalls can help, as well. Ways to manage/cope with tinnitus  Some tinnitus may last a long time. To manage your tinnitus, try to: Avoid noises that you think caused your tinnitus. If you can't avoid loud noises, wear earplugs or earmuffs. Ignore the sound by paying attention to other things. Keeping your brain busy with other tasks or background noise can help your brain not focus on the tinnitus. Try to not give the tinnitus an emotional reaction. Do your best to ignore the sound and not let it bother you. Relax using biofeedback, meditation, or yoga. Feeling stressed and being tired can make tinnitus worse. Play music or white noise to help you sleep. Background noise may cover up the noise that you hear in your ears. You can buy a tabletop machine or a device that sits under your pillow to play soothing sounds, like ocean waves. Smart phones have free apps, such as Whist, Relax Melodies, ReSound Relief, and [employer]. These apps have different types of sounds/noise, some of which you can blend together to find sounds that are most soothing to you. Hearing aid technology, especially when there is some hearing loss, may help reduce tinnitus symptoms by giving your brain better access to the sounds it is missing. There are some hearing aids with built-in noise generator programs, which may help when amplification alone is not enough. Additional resources may be found through the [employer] at www.hoa.org    When should you call for help?   Call [phone]

## 2023-10-09 NOTE — PROGRESS NOTES
[address] & NECK SURGERY  Follow up      Patient Name: Roxy Dolan  Medical Record Number:  [id number]  Primary Care Physician:  Piper Kothari MD  Date of Consultation: 10/9/2023    Chief Complaint: Dizziness        Interval History  Patient said that a week or more ago she fell and hit the side of her head on the left. Shortly after that she started having dizziness. She describes the dizziness as vertigo. It seems to occur with head movements. She had a history of right BPPV that we treated her for a couple of years ago. She had some ringing in her ears after the trauma as well. She has not had any significant vision changes or other issues noted. REVIEW OF SYSTEMS  As above    PHYSICAL EXAM  GENERAL: No Acute Distress, Alert and Oriented, no Hoarseness, strong voice  EYES: EOMI, Anti-icteric  HENT:   Head: Normocephalic and atraumatic. Face: She has some bruising in the left temporal region extending into the left periorbital area. Right Ear: Normal external ear, normal external auditory canal, intact tympanic membrane with normal mobility and aerated middle ear  Left Ear: Normal external ear, normal external auditory canal, intact tympanic membrane with normal mobility and aerated middle ear  Mouth/Oral Cavity:  normal lips, Uvula is midline, no mucosal lesions, no trismus  Oropharynx/Larynx:  normal oropharynx  Nose:Normal external nasal appearance. NECK: Normal range of motion, no thyromegaly, trachea is midline, no lymphadenopathy, no neck masses, no crepitus  Neuro: Right [place]-Hallpike showed nystagmus as well as subjective vertigo. I did an Epley maneuver and another Jai-Hallpike showed significant improvement in symptoms      Patient had an audiogram that showed normal hearing          ASSESSMENT/PLAN  1. Benign paroxysmal positional vertigo of right ear  I feel as though the head trauma probably triggered the BPPV on the right side.   She is

## 2023-10-09 NOTE — PROGRESS NOTES
25-word list at 55 dBHL using recorded speech stimuli. Tympanometry: Normal peak pressure and compliance, Type A tympanogram, consistent with normal middle ear function. Acoustic Reflexes: Ipsilateral: Did not test. Contralateral: Did not test.    Reliability: Good  Transducer: Inserts    See scanned audiogram dated 10/9/2023  for results. PATIENT EDUCATION:     The following items were discussed with the patient:   - Good Communication Strategies  - Tinnitus Management Strategies    - Fall Risk and Prevention   - Dizziness    Educational information was shared in the After Visit Summary. RECOMMENDATIONS:                                                                                                                                                                                                                                                          The following items are recommended based on patient report and results from today's appointment:   - Continue medical follow-up with Amanda Shine MD.   - Retest hearing as medically indicated and/or sooner if a change in hearing is noted. - Utilize \"Good Communication Strategies\" as discussed to assist in speech understanding with communication partners. - Maintain a sound enriched environment to assist in the management of tinnitus symptoms.  - Use hearing protection devices (HPDs), such as protective ear muffs and ear plugs, when exposed to dangerous sound levels. - If medically indicated, consider vestibular evaluation to further investigate symptoms of dizziness.        Simón Joaquin  Audiologist    Chart CC'd to: Amanda Shine MD      Degree of   Hearing Sensitivity dB Range   Within Normal Limits (WNL) 0 - 20   Mild 25 - 40   Moderate 45 - 55   Moderately-Severe 60 - 70   Severe 75 - 90   Profound 95 +

## 2023-11-10 ENCOUNTER — OFFICE VISIT (OUTPATIENT)
Dept: FAMILY MEDICINE CLINIC | Age: 58
End: 2023-11-10
Payer: COMMERCIAL

## 2023-11-10 VITALS
SYSTOLIC BLOOD PRESSURE: 118 MMHG | DIASTOLIC BLOOD PRESSURE: 80 MMHG | TEMPERATURE: 97.3 F | WEIGHT: 177 LBS | BODY MASS INDEX: 27.78 KG/M2 | HEIGHT: 67 IN | HEART RATE: 80 BPM

## 2023-11-10 DIAGNOSIS — E11.9 TYPE 2 DIABETES MELLITUS WITHOUT COMPLICATION, WITHOUT LONG-TERM CURRENT USE OF INSULIN (HCC): ICD-10-CM

## 2023-11-10 DIAGNOSIS — E78.2 MIXED HYPERLIPIDEMIA: ICD-10-CM

## 2023-11-10 DIAGNOSIS — I10 ESSENTIAL HYPERTENSION: Primary | ICD-10-CM

## 2023-11-10 PROCEDURE — 3017F COLORECTAL CA SCREEN DOC REV: CPT | Performed by: FAMILY MEDICINE

## 2023-11-10 PROCEDURE — G8427 DOCREV CUR MEDS BY ELIG CLIN: HCPCS | Performed by: FAMILY MEDICINE

## 2023-11-10 PROCEDURE — 99214 OFFICE O/P EST MOD 30 MIN: CPT | Performed by: FAMILY MEDICINE

## 2023-11-10 PROCEDURE — G8417 CALC BMI ABV UP PARAM F/U: HCPCS | Performed by: FAMILY MEDICINE

## 2023-11-10 PROCEDURE — 3079F DIAST BP 80-89 MM HG: CPT | Performed by: FAMILY MEDICINE

## 2023-11-10 PROCEDURE — 1036F TOBACCO NON-USER: CPT | Performed by: FAMILY MEDICINE

## 2023-11-10 PROCEDURE — G8484 FLU IMMUNIZE NO ADMIN: HCPCS | Performed by: FAMILY MEDICINE

## 2023-11-10 PROCEDURE — 3044F HG A1C LEVEL LT 7.0%: CPT | Performed by: FAMILY MEDICINE

## 2023-11-10 PROCEDURE — 2022F DILAT RTA XM EVC RTNOPTHY: CPT | Performed by: FAMILY MEDICINE

## 2023-11-10 PROCEDURE — 3074F SYST BP LT 130 MM HG: CPT | Performed by: FAMILY MEDICINE

## 2023-11-10 ASSESSMENT — ENCOUNTER SYMPTOMS
CHEST TIGHTNESS: 0
NAUSEA: 0
VOMITING: 0
BLOOD IN STOOL: 0
CONSTIPATION: 0
DIARRHEA: 0
SHORTNESS OF BREATH: 0
ABDOMINAL PAIN: 0
ABDOMINAL DISTENTION: 0

## 2023-12-08 ENCOUNTER — OFFICE VISIT (OUTPATIENT)
Dept: FAMILY MEDICINE CLINIC | Age: 58
End: 2023-12-08
Payer: COMMERCIAL

## 2023-12-08 VITALS
BODY MASS INDEX: 28.25 KG/M2 | TEMPERATURE: 97.2 F | DIASTOLIC BLOOD PRESSURE: 80 MMHG | SYSTOLIC BLOOD PRESSURE: 122 MMHG | HEIGHT: 67 IN | WEIGHT: 180 LBS

## 2023-12-08 DIAGNOSIS — J01.00 ACUTE NON-RECURRENT MAXILLARY SINUSITIS: Primary | ICD-10-CM

## 2023-12-08 PROCEDURE — 3079F DIAST BP 80-89 MM HG: CPT | Performed by: FAMILY MEDICINE

## 2023-12-08 PROCEDURE — G8417 CALC BMI ABV UP PARAM F/U: HCPCS | Performed by: FAMILY MEDICINE

## 2023-12-08 PROCEDURE — 3017F COLORECTAL CA SCREEN DOC REV: CPT | Performed by: FAMILY MEDICINE

## 2023-12-08 PROCEDURE — 1036F TOBACCO NON-USER: CPT | Performed by: FAMILY MEDICINE

## 2023-12-08 PROCEDURE — 99213 OFFICE O/P EST LOW 20 MIN: CPT | Performed by: FAMILY MEDICINE

## 2023-12-08 PROCEDURE — G8484 FLU IMMUNIZE NO ADMIN: HCPCS | Performed by: FAMILY MEDICINE

## 2023-12-08 PROCEDURE — 3074F SYST BP LT 130 MM HG: CPT | Performed by: FAMILY MEDICINE

## 2023-12-08 PROCEDURE — G8427 DOCREV CUR MEDS BY ELIG CLIN: HCPCS | Performed by: FAMILY MEDICINE

## 2023-12-08 RX ORDER — DOXYCYCLINE HYCLATE 100 MG/1
100 CAPSULE ORAL 2 TIMES DAILY
Qty: 20 CAPSULE | Refills: 0 | Status: SHIPPED | OUTPATIENT
Start: 2023-12-08 | End: 2023-12-18

## 2023-12-08 NOTE — PROGRESS NOTES
Subjective:      Patient ID: Bryson Jones is a 59 y.o. female.     Chief Complaint   Patient presents with    Congestion     Headache, pressure in ears, sore throat x 1 week         Patient presents with:  Congestion: Headache, pressure in ears, sore throat x 1 week     Here for the above   Head congestion and facial pain and pnd  No fever   No exposure    YOB: 1965    Date of Visit:  12/8/2023     -- Augmentin [Amoxicillin-Pot Clavulanate] -- Hives   -- Methylprednisolone -- Hives   -- Niaspan [Niacin Er] -- Hives   -- Percocet [Oxycodone-Acetaminophen] -- Other (See Comments)    --  Dropped her blood pressure to low   -- Skelaxin [Metaxalone] -- Hives   -- Sulfamethoxazole-Trimethoprim -- Hives   -- Ciprofloxacin -- Rash   -- Omnicef -- Rash    Current Outpatient Medications:  rosuvastatin (CRESTOR) 20 MG tablet, Take 1 tablet by mouth daily, Disp: 90 tablet, Rfl: 1  vitamin D (ERGOCALCIFEROL) 1.25 MG (51124 UT) CAPS capsule, Take 1 capsule by mouth every 30 days, Disp: 12 capsule, Rfl: 3  levothyroxine (SYNTHROID) 100 MCG tablet, TAKE 1 TABLET BY MOUTH DAILY, Disp: 90 tablet, Rfl: 1  metFORMIN (GLUCOPHAGE-XR) 500 MG extended release tablet, TAKE 1 TABLET BY MOUTH DAILY WITH BREAKFAST, Disp: 90 tablet, Rfl: 1  chlorthalidone (HYGROTON) 25 MG tablet, TAKE 1 TABLET BY MOUTH DAILY, Disp: 90 tablet, Rfl: 3  Ascorbic Acid (VITAMIN C) 1000 MG tablet, Take 1 tablet by mouth daily, Disp: , Rfl:   albuterol sulfate HFA (VENTOLIN HFA) 108 (90 Base) MCG/ACT inhaler, Inhale 2 puffs into the lungs 4 times daily as needed for Wheezing (or cough) Dispense with spacing device, Disp: 18 g, Rfl: 0  loratadine (CLARITIN) 10 MG tablet, Take 1 tablet by mouth daily, Disp: , Rfl:   azelastine (ASTELIN) 0.1 % nasal spray, USE 1 SPRAY IN EACH NOSTRIL TWICE DAILY AS DIRECTED, Disp: 60 mL, Rfl: 1  Omega-3 Fatty Acids (RA FISH OIL) 1400 MG CPDR, Take by mouth, Disp: , Rfl:    VITAMIN E BLEND PO, Take by mouth, Disp: ,

## 2024-03-28 RX ORDER — LEVOTHYROXINE SODIUM 0.1 MG/1
100 TABLET ORAL DAILY
Qty: 90 TABLET | Refills: 1 | Status: SHIPPED | OUTPATIENT
Start: 2024-03-28

## 2024-03-28 RX ORDER — METFORMIN HYDROCHLORIDE 500 MG/1
TABLET, EXTENDED RELEASE ORAL
Qty: 90 TABLET | Refills: 1 | Status: SHIPPED | OUTPATIENT
Start: 2024-03-28

## 2024-04-04 ENCOUNTER — HOSPITAL ENCOUNTER (OUTPATIENT)
Age: 59
Discharge: HOME | End: 2024-04-04
Payer: COMMERCIAL

## 2024-04-04 ENCOUNTER — HOSPITAL ENCOUNTER (OUTPATIENT)
Dept: GENERAL RADIOLOGY | Age: 59
Discharge: HOME | End: 2024-04-04
Payer: COMMERCIAL

## 2024-04-04 DIAGNOSIS — N20.0 CALCULUS OF KIDNEY: ICD-10-CM

## 2024-04-04 DIAGNOSIS — R30.0 DYSURIA: ICD-10-CM

## 2024-04-04 DIAGNOSIS — Z87.440 HISTORY OF URINARY TRACT INFECTION: ICD-10-CM

## 2024-04-04 DIAGNOSIS — N39.0 URINARY TRACT INFECTION WITHOUT HEMATURIA, SITE UNSPECIFIED: ICD-10-CM

## 2024-04-04 DIAGNOSIS — Z87.442 PERSONAL HISTORY OF URINARY CALCULI: ICD-10-CM

## 2024-04-04 PROCEDURE — 74018 RADEX ABDOMEN 1 VIEW: CPT

## 2024-04-05 RX ORDER — ROSUVASTATIN CALCIUM 20 MG/1
20 TABLET, COATED ORAL DAILY
Qty: 90 TABLET | Refills: 1 | Status: SHIPPED | OUTPATIENT
Start: 2024-04-05

## 2024-04-09 ENCOUNTER — TRANSCRIBE ORDERS (OUTPATIENT)
Dept: ADMINISTRATIVE | Age: 59
End: 2024-04-09

## 2024-04-09 DIAGNOSIS — Z87.440 PERSONAL HISTORY OF URINARY TRACT INFECTION: ICD-10-CM

## 2024-04-09 DIAGNOSIS — N20.0 CALCULUS OF KIDNEY: ICD-10-CM

## 2024-04-09 DIAGNOSIS — N39.0 URINARY TRACT INFECTION WITHOUT HEMATURIA, SITE UNSPECIFIED: Primary | ICD-10-CM

## 2024-04-09 DIAGNOSIS — R30.0 DYSURIA: ICD-10-CM

## 2024-04-09 DIAGNOSIS — Z87.442 PERSONAL HISTORY OF URINARY CALCULI: ICD-10-CM

## 2024-04-13 ENCOUNTER — HOSPITAL ENCOUNTER (OUTPATIENT)
Dept: CT IMAGING | Age: 59
Discharge: HOME | End: 2024-04-13
Payer: COMMERCIAL

## 2024-04-13 DIAGNOSIS — N39.0 URINARY TRACT INFECTION WITHOUT HEMATURIA, SITE UNSPECIFIED: ICD-10-CM

## 2024-04-13 DIAGNOSIS — Z87.442 PERSONAL HISTORY OF URINARY CALCULI: ICD-10-CM

## 2024-04-13 DIAGNOSIS — R30.0 DYSURIA: ICD-10-CM

## 2024-04-13 DIAGNOSIS — Z87.440 PERSONAL HISTORY OF URINARY TRACT INFECTION: ICD-10-CM

## 2024-04-13 DIAGNOSIS — N20.0 CALCULUS OF KIDNEY: ICD-10-CM

## 2024-04-13 PROCEDURE — 74176 CT ABD & PELVIS W/O CONTRAST: CPT

## 2024-04-24 ENCOUNTER — OFFICE VISIT (OUTPATIENT)
Dept: FAMILY MEDICINE CLINIC | Age: 59
End: 2024-04-24
Payer: COMMERCIAL

## 2024-04-24 VITALS
SYSTOLIC BLOOD PRESSURE: 128 MMHG | HEIGHT: 67 IN | BODY MASS INDEX: 28.72 KG/M2 | TEMPERATURE: 97.5 F | DIASTOLIC BLOOD PRESSURE: 80 MMHG | WEIGHT: 183 LBS

## 2024-04-24 DIAGNOSIS — J01.90 ACUTE BACTERIAL SINUSITIS: Primary | ICD-10-CM

## 2024-04-24 DIAGNOSIS — B96.89 ACUTE BACTERIAL SINUSITIS: Primary | ICD-10-CM

## 2024-04-24 DIAGNOSIS — R30.0 DYSURIA: ICD-10-CM

## 2024-04-24 LAB
BILIRUBIN, POC: NEGATIVE
BLOOD URINE, POC: NEGATIVE
CLARITY, POC: CLEAR
COLOR, POC: NORMAL
GLUCOSE URINE, POC: 500
KETONES, POC: NEGATIVE
LEUKOCYTE EST, POC: NORMAL
NITRITE, POC: NEGATIVE
PH, POC: 7
PROTEIN, POC: NEGATIVE
SPECIFIC GRAVITY, POC: 1.01
UROBILINOGEN, POC: 0.2

## 2024-04-24 PROCEDURE — 1036F TOBACCO NON-USER: CPT

## 2024-04-24 PROCEDURE — G8427 DOCREV CUR MEDS BY ELIG CLIN: HCPCS

## 2024-04-24 PROCEDURE — 81002 URINALYSIS NONAUTO W/O SCOPE: CPT

## 2024-04-24 PROCEDURE — 3079F DIAST BP 80-89 MM HG: CPT

## 2024-04-24 PROCEDURE — 3074F SYST BP LT 130 MM HG: CPT

## 2024-04-24 PROCEDURE — 3017F COLORECTAL CA SCREEN DOC REV: CPT

## 2024-04-24 PROCEDURE — 99213 OFFICE O/P EST LOW 20 MIN: CPT

## 2024-04-24 PROCEDURE — G8417 CALC BMI ABV UP PARAM F/U: HCPCS

## 2024-04-24 RX ORDER — DOXYCYCLINE HYCLATE 100 MG
100 TABLET ORAL 2 TIMES DAILY
Qty: 14 TABLET | Refills: 0 | Status: SHIPPED | OUTPATIENT
Start: 2024-04-24 | End: 2024-05-01

## 2024-04-24 ASSESSMENT — ENCOUNTER SYMPTOMS
APNEA: 0
BACK PAIN: 0
VOMITING: 0
SORE THROAT: 1
DIARRHEA: 0
TROUBLE SWALLOWING: 0
CONSTIPATION: 0
ABDOMINAL PAIN: 0
COLOR CHANGE: 0
COUGH: 0
SHORTNESS OF BREATH: 0
WHEEZING: 0
SINUS PRESSURE: 1
NAUSEA: 0
BLOOD IN STOOL: 0

## 2024-04-24 ASSESSMENT — PATIENT HEALTH QUESTIONNAIRE - PHQ9
2. FEELING DOWN, DEPRESSED OR HOPELESS: NOT AT ALL
1. LITTLE INTEREST OR PLEASURE IN DOING THINGS: NOT AT ALL
SUM OF ALL RESPONSES TO PHQ QUESTIONS 1-9: 0
SUM OF ALL RESPONSES TO PHQ9 QUESTIONS 1 & 2: 0
SUM OF ALL RESPONSES TO PHQ QUESTIONS 1-9: 0

## 2024-04-24 NOTE — ASSESSMENT & PLAN NOTE
Symptoms consistent with sinusitis.  Will treat with Doxy twice daily for 7 days, can continue over-the-counter medications as needed. UA in office negative.  Plan to follow-up with urology as planned.

## 2024-04-24 NOTE — PROGRESS NOTES
(36.4 °C)   TempSrc: Temporal   Weight: 83 kg (183 lb)   Height: 1.702 m (5' 7\")       Physical Exam  Vitals reviewed.   Constitutional:       Appearance: Normal appearance.   HENT:      Head: Normocephalic.      Right Ear: Tympanic membrane, ear canal and external ear normal.      Left Ear: Tympanic membrane, ear canal and external ear normal.      Nose: Nose normal. No congestion.      Mouth/Throat:      Mouth: Mucous membranes are moist.      Pharynx: Posterior oropharyngeal erythema present. No oropharyngeal exudate.   Eyes:      Pupils: Pupils are equal, round, and reactive to light.   Cardiovascular:      Rate and Rhythm: Normal rate and regular rhythm.      Heart sounds: Normal heart sounds.   Pulmonary:      Effort: Pulmonary effort is normal.      Breath sounds: Normal breath sounds. No wheezing, rhonchi or rales.   Abdominal:      General: Abdomen is flat.      Palpations: Abdomen is soft.   Musculoskeletal:         General: No swelling. Normal range of motion.      Cervical back: Normal range of motion.   Lymphadenopathy:      Cervical: No cervical adenopathy.   Skin:     General: Skin is warm.      Coloration: Skin is not jaundiced.   Neurological:      General: No focal deficit present.      Mental Status: She is alert and oriented to person, place, and time.   Psychiatric:         Mood and Affect: Mood normal.         Behavior: Behavior is cooperative.         Thought Content: Thought content normal.         Judgment: Judgment normal.                 An electronic signature was used to authenticate this note.    --ALEXANDER Concepcion - PATITO

## 2024-05-17 RX ORDER — AZELASTINE 1 MG/ML
SPRAY, METERED NASAL
Qty: 60 ML | Refills: 1 | OUTPATIENT
Start: 2024-05-17

## 2024-05-17 NOTE — TELEPHONE ENCOUNTER
Refill Request:     Last Office Visit:  10/9/2023     Next Scheduled Visit : Visit date not found     Medication Requested:   Requested Prescriptions     Pending Prescriptions Disp Refills    azelastine (ASTELIN) 0.1 % nasal spray 60 mL 1     Sig: USE 1 SPRAY IN EACH NOSTRIL TWICE DAILY AS DIRECTED        Pharmacy:    [id number] - [place], [place] - [address] [facility] - P [phone] - F [phone]  [address] [facility] [address]  Phone: [phone] Fax: [phone]    [id number] - [place], [place] - [address] [facility] [phone] - F [phone]  [address] [place] [facility] [address]  Phone: [phone] Fax: [phone]

## 2024-05-22 RX ORDER — AZELASTINE 1 MG/ML
SPRAY, METERED NASAL
Qty: 60 ML | Refills: 1 | Status: SHIPPED | OUTPATIENT
Start: 2024-05-22

## 2024-05-22 NOTE — TELEPHONE ENCOUNTER
Refill Request:     Last Office Visit:  10/9/2023     Next Scheduled Visit : Visit date not found     Medication Requested:   Requested Prescriptions     Pending Prescriptions Disp Refills    azelastine (ASTELIN) 0.1 % nasal spray 60 mL 1     Sig: Use in each nostril as directed        Pharmacy:    [id number] - [place], [place] - [address] [address] [facility] [address] - P [phone] - F [phone]  [address] [facility] [address]  Phone: [phone] Fax: [phone]    [id number] - [place], [place] - [address] [facility] -  [phone] - F [phone]  [address] [place] [facility] [address]  Phone: [phone] Fax: [phone]

## 2024-06-19 ENCOUNTER — OFFICE VISIT (OUTPATIENT)
Dept: FAMILY MEDICINE CLINIC | Age: 59
End: 2024-06-19
Payer: COMMERCIAL

## 2024-06-19 VITALS
SYSTOLIC BLOOD PRESSURE: 115 MMHG | TEMPERATURE: 97.3 F | HEIGHT: 67 IN | DIASTOLIC BLOOD PRESSURE: 75 MMHG | WEIGHT: 182 LBS | HEART RATE: 93 BPM | OXYGEN SATURATION: 98 % | BODY MASS INDEX: 28.56 KG/M2

## 2024-06-19 DIAGNOSIS — J01.90 ACUTE BACTERIAL SINUSITIS: Primary | ICD-10-CM

## 2024-06-19 DIAGNOSIS — B96.89 ACUTE BACTERIAL SINUSITIS: Primary | ICD-10-CM

## 2024-06-19 PROCEDURE — G8417 CALC BMI ABV UP PARAM F/U: HCPCS

## 2024-06-19 PROCEDURE — 3017F COLORECTAL CA SCREEN DOC REV: CPT

## 2024-06-19 PROCEDURE — G8427 DOCREV CUR MEDS BY ELIG CLIN: HCPCS

## 2024-06-19 PROCEDURE — 99213 OFFICE O/P EST LOW 20 MIN: CPT

## 2024-06-19 PROCEDURE — 3078F DIAST BP <80 MM HG: CPT

## 2024-06-19 PROCEDURE — 3074F SYST BP LT 130 MM HG: CPT

## 2024-06-19 PROCEDURE — 1036F TOBACCO NON-USER: CPT

## 2024-06-19 RX ORDER — BENZONATATE 100 MG/1
100 CAPSULE ORAL 3 TIMES DAILY PRN
Qty: 30 CAPSULE | Refills: 0 | Status: SHIPPED | OUTPATIENT
Start: 2024-06-19 | End: 2024-06-29

## 2024-06-19 RX ORDER — AZITHROMYCIN 250 MG/1
TABLET, FILM COATED ORAL
Qty: 6 TABLET | Refills: 0 | Status: SHIPPED | OUTPATIENT
Start: 2024-06-19 | End: 2024-06-29

## 2024-06-19 SDOH — ECONOMIC STABILITY: FOOD INSECURITY: WITHIN THE PAST 12 MONTHS, THE FOOD YOU BOUGHT JUST DIDN'T LAST AND YOU DIDN'T HAVE MONEY TO GET MORE.: NEVER TRUE

## 2024-06-19 SDOH — ECONOMIC STABILITY: FOOD INSECURITY: WITHIN THE PAST 12 MONTHS, YOU WORRIED THAT YOUR FOOD WOULD RUN OUT BEFORE YOU GOT MONEY TO BUY MORE.: NEVER TRUE

## 2024-06-19 SDOH — ECONOMIC STABILITY: INCOME INSECURITY: HOW HARD IS IT FOR YOU TO PAY FOR THE VERY BASICS LIKE FOOD, HOUSING, MEDICAL CARE, AND HEATING?: NOT HARD AT ALL

## 2024-06-19 ASSESSMENT — ENCOUNTER SYMPTOMS
TROUBLE SWALLOWING: 0
WHEEZING: 0
APNEA: 0
VOMITING: 0
CONSTIPATION: 0
NAUSEA: 0
COLOR CHANGE: 0
ABDOMINAL PAIN: 0
DIARRHEA: 0
BACK PAIN: 0
COUGH: 1
BLOOD IN STOOL: 0
SHORTNESS OF BREATH: 0
SINUS PRESSURE: 0
SORE THROAT: 0

## 2024-06-19 NOTE — ASSESSMENT & PLAN NOTE
Symptoms consistent with sinusitis, lungs CTA. Will treat with azithromycin, can continue over-the-counter medications as needed.

## 2024-06-19 NOTE — PROGRESS NOTES
Sandra Gabriel (:  1965) is a 58 y.o. female,Established patient, here for evaluation of the following chief complaint(s):  Cough (Patient states this has been going on for 2 weeks. Occasional productive cough mostly clear in color. Patient was in [place]. ) and Sinus Problem (Patient has been having this for 2 weeks. Nasal congestion. )      ASSESSMENT/PLAN:  1. Acute bacterial sinusitis  Assessment & Plan:      Symptoms consistent with sinusitis, lungs CTA. Will treat with azithromycin, can continue over-the-counter medications as needed.  Orders:  -     azithromycin (ZITHROMAX) 250 MG tablet; 500mg on day 1 followed by 250mg on days 2 - 5, Disp-6 tablet, R-0Normal  -     benzonatate (TESSALON) 100 MG capsule; Take 1 capsule by mouth 3 times daily as needed for Cough, Disp-30 capsule, R-0Normal      No follow-ups on file.    SUBJECTIVE/OBJECTIVE:    Sandra complains today of two weeks of productive cough, sinus congestion, PND. She was recently in [place], caught something on the way home.  Tried sudafed otc without much relief.  Fever for a day last week, diarrhea but now cleared.   Denies sore throat, SOB, n/v.   also sick at home but got antibiotics this past week, feeling better.      Current Outpatient Medications   Medication Sig Dispense Refill    azithromycin (ZITHROMAX) 250 MG tablet 500mg on day 1 followed by 250mg on days 2 - 5 6 tablet 0    benzonatate (TESSALON) 100 MG capsule Take 1 capsule by mouth 3 times daily as needed for Cough 30 capsule 0    azelastine (ASTELIN) 0.1 % nasal spray USE 1 SPRAY IN EACH NOSTRIL TWICE DAILY AS DIRECTED 60 mL 1    rosuvastatin (CRESTOR) 20 MG tablet TAKE 1 TABLET BY MOUTH DAILY 90 tablet 1    levothyroxine (SYNTHROID) 100 MCG tablet TAKE 1 TABLET BY MOUTH DAILY 90 tablet 1    metFORMIN (GLUCOPHAGE-XR) 500 MG extended release tablet TAKE 1 TABLET BY MOUTH DAILY WITH BREAKFAST 90 tablet 1    vitamin D (ERGOCALCIFEROL) 1.25 MG (56837 UT) CAPS capsule

## 2024-07-18 ENCOUNTER — OFFICE VISIT (OUTPATIENT)
Dept: FAMILY MEDICINE CLINIC | Age: 59
End: 2024-07-18
Payer: COMMERCIAL

## 2024-07-18 VITALS
BODY MASS INDEX: 28.25 KG/M2 | WEIGHT: 180 LBS | HEIGHT: 67 IN | DIASTOLIC BLOOD PRESSURE: 80 MMHG | SYSTOLIC BLOOD PRESSURE: 120 MMHG | TEMPERATURE: 97.9 F

## 2024-07-18 DIAGNOSIS — J01.90 ACUTE BACTERIAL SINUSITIS: ICD-10-CM

## 2024-07-18 DIAGNOSIS — B96.89 ACUTE BACTERIAL SINUSITIS: ICD-10-CM

## 2024-07-18 DIAGNOSIS — E11.9 TYPE 2 DIABETES MELLITUS WITHOUT COMPLICATION, WITHOUT LONG-TERM CURRENT USE OF INSULIN (HCC): ICD-10-CM

## 2024-07-18 DIAGNOSIS — I10 ESSENTIAL HYPERTENSION: Primary | ICD-10-CM

## 2024-07-18 PROCEDURE — 3017F COLORECTAL CA SCREEN DOC REV: CPT | Performed by: FAMILY MEDICINE

## 2024-07-18 PROCEDURE — 3074F SYST BP LT 130 MM HG: CPT | Performed by: FAMILY MEDICINE

## 2024-07-18 PROCEDURE — 2022F DILAT RTA XM EVC RTNOPTHY: CPT | Performed by: FAMILY MEDICINE

## 2024-07-18 PROCEDURE — 99214 OFFICE O/P EST MOD 30 MIN: CPT | Performed by: FAMILY MEDICINE

## 2024-07-18 PROCEDURE — 3046F HEMOGLOBIN A1C LEVEL >9.0%: CPT | Performed by: FAMILY MEDICINE

## 2024-07-18 PROCEDURE — G8417 CALC BMI ABV UP PARAM F/U: HCPCS | Performed by: FAMILY MEDICINE

## 2024-07-18 PROCEDURE — 3079F DIAST BP 80-89 MM HG: CPT | Performed by: FAMILY MEDICINE

## 2024-07-18 PROCEDURE — G8427 DOCREV CUR MEDS BY ELIG CLIN: HCPCS | Performed by: FAMILY MEDICINE

## 2024-07-18 PROCEDURE — 1036F TOBACCO NON-USER: CPT | Performed by: FAMILY MEDICINE

## 2024-07-18 RX ORDER — DOXYCYCLINE HYCLATE 100 MG
100 TABLET ORAL 2 TIMES DAILY
Qty: 20 TABLET | Refills: 0 | Status: SHIPPED | OUTPATIENT
Start: 2024-07-18 | End: 2024-07-28

## 2024-07-18 NOTE — PROGRESS NOTES
Subjective:      Patient ID: Sandra Gabriel is a 59 y.o. female.    Chief Complaint   Patient presents with    Congestion     Head congestion, drainage, cough - clear phlegm x 1 month        Patient presents with:  Congestion: Head congestion, drainage, cough - clear phlegm x 1 month    Here for the above   She has had for little over a month  She has facial and sinus pain and congestion  No fever now but she did have pain at beginning  Pnd  Cough some     She has no other c/o  No foot sx  No cp no sob         YOB: 1965    Date of Visit:  7/18/2024     -- Augmentin [Amoxicillin-Pot Clavulanate] -- Hives   -- Methylprednisolone -- Hives   -- Niaspan [Niacin Er] -- Hives   -- Nitrofuran Derivatives -- Hives    --  hives   -- Percocet [Oxycodone-Acetaminophen] -- Other (See Comments)    --  Dropped her blood pressure to low   -- Skelaxin [Metaxalone] -- Hives   -- Sulfamethoxazole-Trimethoprim -- Hives   -- Ciprofloxacin -- Rash   -- Omnicef -- Rash    Current Outpatient Medications:  chlorthalidone (HYGROTON) 25 MG tablet, TAKE 1 TABLET BY MOUTH DAILY, Disp: 90 tablet, Rfl: 3  azelastine (ASTELIN) 0.1 % nasal spray, USE 1 SPRAY IN EACH NOSTRIL TWICE DAILY AS DIRECTED, Disp: 60 mL, Rfl: 1  rosuvastatin (CRESTOR) 20 MG tablet, TAKE 1 TABLET BY MOUTH DAILY, Disp: 90 tablet, Rfl: 1  levothyroxine (SYNTHROID) 100 MCG tablet, TAKE 1 TABLET BY MOUTH DAILY, Disp: 90 tablet, Rfl: 1  metFORMIN (GLUCOPHAGE-XR) 500 MG extended release tablet, TAKE 1 TABLET BY MOUTH DAILY WITH BREAKFAST, Disp: 90 tablet, Rfl: 1  vitamin D (ERGOCALCIFEROL) 1.25 MG (15296 UT) CAPS capsule, Take 1 capsule by mouth every 30 days, Disp: 12 capsule, Rfl: 3  Ascorbic Acid (VITAMIN C) 1000 MG tablet, Take 1 tablet by mouth daily, Disp: , Rfl:   albuterol sulfate HFA (VENTOLIN HFA) 108 (90 Base) MCG/ACT inhaler, Inhale 2 puffs into the lungs 4 times daily as needed for Wheezing (or cough) Dispense with spacing device, Disp: 18 g, Rfl:

## 2024-07-18 NOTE — PATIENT INSTRUCTIONS
Use the antibiotic  Call the ENT doctor to get back in to see them for the recurrent sinus problems  See in 4 months

## 2024-08-06 RX ORDER — ROSUVASTATIN CALCIUM 20 MG/1
20 TABLET, COATED ORAL DAILY
Qty: 90 TABLET | Refills: 1 | Status: SHIPPED | OUTPATIENT
Start: 2024-08-06

## 2024-08-09 DIAGNOSIS — E11.9 TYPE 2 DIABETES MELLITUS WITHOUT COMPLICATION, WITHOUT LONG-TERM CURRENT USE OF INSULIN (HCC): ICD-10-CM

## 2024-08-09 DIAGNOSIS — I10 ESSENTIAL HYPERTENSION: ICD-10-CM

## 2024-08-09 LAB
ALBUMIN SERPL-MCNC: 4.2 G/DL (ref 3.4–5)
ALBUMIN/GLOB SERPL: 1.8 {RATIO} (ref 1.1–2.2)
ALP SERPL-CCNC: 113 U/L (ref 40–129)
ALT SERPL-CCNC: 45 U/L (ref 10–40)
ANION GAP SERPL CALCULATED.3IONS-SCNC: 12 MMOL/L (ref 3–16)
AST SERPL-CCNC: 36 U/L (ref 15–37)
BILIRUB SERPL-MCNC: 0.8 MG/DL (ref 0–1)
BUN SERPL-MCNC: 19 MG/DL (ref 7–20)
CALCIUM SERPL-MCNC: 9.8 MG/DL (ref 8.3–10.6)
CHLORIDE SERPL-SCNC: 99 MMOL/L (ref 99–110)
CHOLEST SERPL-MCNC: 174 MG/DL (ref 0–199)
CO2 SERPL-SCNC: 28 MMOL/L (ref 21–32)
CREAT SERPL-MCNC: 0.7 MG/DL (ref 0.6–1.2)
EST. AVERAGE GLUCOSE BLD GHB EST-MCNC: 197.3 MG/DL
FOLATE SERPL-MCNC: 11.85 NG/ML (ref 4.78–24.2)
GFR SERPLBLD CREATININE-BSD FMLA CKD-EPI: >90 ML/MIN/{1.73_M2}
GLUCOSE SERPL-MCNC: 220 MG/DL (ref 70–99)
HBA1C MFR BLD: 8.5 %
HDLC SERPL-MCNC: 40 MG/DL (ref 40–60)
LDLC SERPL CALC-MCNC: 78 MG/DL
POTASSIUM SERPL-SCNC: 3.9 MMOL/L (ref 3.5–5.1)
PROT SERPL-MCNC: 6.5 G/DL (ref 6.4–8.2)
SODIUM SERPL-SCNC: 139 MMOL/L (ref 136–145)
TRIGL SERPL-MCNC: 280 MG/DL (ref 0–150)
VIT B12 SERPL-MCNC: 854 PG/ML (ref 211–911)
VLDLC SERPL CALC-MCNC: 56 MG/DL

## 2024-08-12 RX ORDER — METFORMIN HYDROCHLORIDE 500 MG/1
500 TABLET, EXTENDED RELEASE ORAL 2 TIMES DAILY WITH MEALS
Qty: 90 TABLET | Refills: 1
Start: 2024-08-12

## 2024-08-20 ENCOUNTER — OFFICE VISIT (OUTPATIENT)
Dept: FAMILY MEDICINE CLINIC | Age: 59
End: 2024-08-20
Payer: COMMERCIAL

## 2024-08-20 VITALS
BODY MASS INDEX: 28 KG/M2 | DIASTOLIC BLOOD PRESSURE: 82 MMHG | WEIGHT: 178.4 LBS | SYSTOLIC BLOOD PRESSURE: 128 MMHG | TEMPERATURE: 97.5 F | HEART RATE: 84 BPM | HEIGHT: 67 IN

## 2024-08-20 DIAGNOSIS — E11.9 TYPE 2 DIABETES MELLITUS WITHOUT COMPLICATION, WITHOUT LONG-TERM CURRENT USE OF INSULIN (HCC): ICD-10-CM

## 2024-08-20 DIAGNOSIS — Z00.00 WELL ADULT EXAM: Primary | ICD-10-CM

## 2024-08-20 DIAGNOSIS — E55.9 VITAMIN D DEFICIENCY: ICD-10-CM

## 2024-08-20 DIAGNOSIS — E03.9 ACQUIRED HYPOTHYROIDISM: ICD-10-CM

## 2024-08-20 PROCEDURE — 3079F DIAST BP 80-89 MM HG: CPT | Performed by: FAMILY MEDICINE

## 2024-08-20 PROCEDURE — 3074F SYST BP LT 130 MM HG: CPT | Performed by: FAMILY MEDICINE

## 2024-08-20 PROCEDURE — 99396 PREV VISIT EST AGE 40-64: CPT | Performed by: FAMILY MEDICINE

## 2024-08-20 RX ORDER — METFORMIN HYDROCHLORIDE 500 MG/1
500 TABLET, EXTENDED RELEASE ORAL 2 TIMES DAILY WITH MEALS
Qty: 180 TABLET | Refills: 1 | Status: SHIPPED | OUTPATIENT
Start: 2024-08-20

## 2024-08-20 ASSESSMENT — ENCOUNTER SYMPTOMS
ABDOMINAL PAIN: 0
SORE THROAT: 0
VOMITING: 0
SHORTNESS OF BREATH: 0
DIARRHEA: 0
TROUBLE SWALLOWING: 0
ABDOMINAL DISTENTION: 0
NAUSEA: 0
COUGH: 0
ROS SKIN COMMENTS: NO LESIONS
CHEST TIGHTNESS: 0
BLOOD IN STOOL: 0
CONSTIPATION: 0

## 2024-08-20 NOTE — PROGRESS NOTES
deficiency  Vitamin D 25 Hydroxy      3. Type 2 diabetes mellitus without complication, without long-term current use of insulin (HCC)  CBC with Auto Differential    Comprehensive Metabolic Panel    Hemoglobin A1C    Vitamin D 25 Hydroxy      4. Acquired hypothyroidism  TSH    T4, Free        Orders Placed This Encounter   Medications    metFORMIN (GLUCOPHAGE-XR) 500 MG extended release tablet     Sig: Take 1 tablet by mouth 2 times daily (with meals)     Dispense:  180 tablet     Refill:  1   Will adjust her metformin dose to bid  Hemoglobin A1C   Date Value Ref Range Status   08/09/2024 8.5 See comment % Final     Comment:     Comment:  Diagnosis of Diabetes: > or = 6.5%  Increased risk of diabetes (Prediabetes): 5.7-6.4%  Glycemic Control: Nonpregnant Adults: <7.0%                    Pregnant: <6.0%             Tinnitus for about 2-3 months  She relates to the travel   Has been sporadic   Utd colon   Neg mammo on 10/16/24    Declined the pneuomonia vaccine  Did advise also of the rsv vaccine       Interval Hx:  Farida fernando on 7/31/24      PLAN:   Plan    Do continue the current medicines   Stay with the diet  Do check blood before the next visit in 4 month          Carlos Sanz MD

## 2024-08-22 ENCOUNTER — OFFICE VISIT (OUTPATIENT)
Dept: ENT CLINIC | Age: 59
End: 2024-08-22
Payer: COMMERCIAL

## 2024-08-22 VITALS — WEIGHT: 178 LBS | HEIGHT: 67 IN | BODY MASS INDEX: 27.94 KG/M2

## 2024-08-22 DIAGNOSIS — R09.81 NASAL CONGESTION: ICD-10-CM

## 2024-08-22 DIAGNOSIS — K21.9 LARYNGOPHARYNGEAL REFLUX (LPR): ICD-10-CM

## 2024-08-22 DIAGNOSIS — J30.9 ALLERGIC RHINITIS, UNSPECIFIED SEASONALITY, UNSPECIFIED TRIGGER: Primary | ICD-10-CM

## 2024-08-22 PROCEDURE — 1036F TOBACCO NON-USER: CPT | Performed by: OTOLARYNGOLOGY

## 2024-08-22 PROCEDURE — 31231 NASAL ENDOSCOPY DX: CPT | Performed by: OTOLARYNGOLOGY

## 2024-08-22 PROCEDURE — 3017F COLORECTAL CA SCREEN DOC REV: CPT | Performed by: OTOLARYNGOLOGY

## 2024-08-22 PROCEDURE — G8417 CALC BMI ABV UP PARAM F/U: HCPCS | Performed by: OTOLARYNGOLOGY

## 2024-08-22 PROCEDURE — 99214 OFFICE O/P EST MOD 30 MIN: CPT | Performed by: OTOLARYNGOLOGY

## 2024-08-22 PROCEDURE — G8427 DOCREV CUR MEDS BY ELIG CLIN: HCPCS | Performed by: OTOLARYNGOLOGY

## 2024-08-22 RX ORDER — PANTOPRAZOLE SODIUM 20 MG/1
20 TABLET, DELAYED RELEASE ORAL
Qty: 30 TABLET | Refills: 5 | Status: SHIPPED | OUTPATIENT
Start: 2024-08-22

## 2024-08-22 RX ORDER — PANTOPRAZOLE SODIUM 20 MG/1
20 TABLET, DELAYED RELEASE ORAL
Qty: 90 TABLET | OUTPATIENT
Start: 2024-08-22

## 2024-08-22 NOTE — PROGRESS NOTES
[facility]  DIVISION OF OTOLARYNGOLOGY- HEAD & NECK SURGERY  Follow up      Patient Name: Sandra Gabriel  Medical Record Number:  [id number]  Primary Care Physician:  Carlos Sanz MD  Date of Consultation: 8/22/2024    Chief Complaint: Nasal issues        Interval History    Patient is following up for her nasal issues.  She still feels like she is congested.  She feels like it got bad after a trip to [place].  She said they got second and seems to be lingering.  She did have doxycycline.  She is using her nasal sprays and irrigations.  She feels like she has some postnasal drip.  She has some stuff in the back of her throat at times.  She gets occasional heartburn, but not all the time.  She does not take reflux medication daily.  She has not seen an allergist in a long time.          REVIEW OF SYSTEMS  As above    PHYSICAL EXAM  GENERAL: No Acute Distress, Alert and Oriented, no Hoarseness, strong voice  EYES: EOMI, Anti-icteric  HENT:   Head: Normocephalic and atraumatic.   Face:  Symmetric, facial nerve intact, no sinus tenderness  Right Ear: Normal external ear, normal external auditory canal, intact tympanic membrane with normal mobility and aerated middle ear  Left Ear: Normal external ear, normal external auditory canal, intact tympanic membrane with normal mobility and aerated middle ear  Mouth/Oral Cavity:  normal lips, Uvula is midline, no mucosal lesions, no trismus,  Oropharynx/Larynx:  normal oropharynx,   Nose:Normal external nasal appearance.  See below  NECK: Normal range of motion, no thyromegaly, trachea is midline, no lymphadenopathy, no neck masses, no crepitus          RADIOLOGY  Summary of findings:  I do see report from March 2024 from a [facility] where she had a CT scan of her sinuses that did not show any significant sinusitis    PROCEDURE  Nasal endoscopy  Lidocaine were applied the bilateral nasal cavity.  Rigid scope used to visualize left nasal cavity.  Mild septal

## 2024-09-11 ENCOUNTER — PATIENT MESSAGE (OUTPATIENT)
Dept: FAMILY MEDICINE CLINIC | Age: 59
End: 2024-09-11

## 2024-09-24 RX ORDER — LEVOTHYROXINE SODIUM 100 UG/1
100 TABLET ORAL DAILY
Qty: 90 TABLET | Refills: 0 | Status: SHIPPED | OUTPATIENT
Start: 2024-09-24

## 2024-11-22 ENCOUNTER — TELEPHONE (OUTPATIENT)
Dept: FAMILY MEDICINE CLINIC | Age: 59
End: 2024-11-22

## 2024-11-22 DIAGNOSIS — E03.9 ACQUIRED HYPOTHYROIDISM: ICD-10-CM

## 2024-11-22 DIAGNOSIS — E11.9 TYPE 2 DIABETES MELLITUS WITHOUT COMPLICATION, WITHOUT LONG-TERM CURRENT USE OF INSULIN (HCC): ICD-10-CM

## 2024-11-22 DIAGNOSIS — E55.9 VITAMIN D DEFICIENCY: ICD-10-CM

## 2024-11-22 LAB
25(OH)D3 SERPL-MCNC: 38.7 NG/ML
ALBUMIN SERPL-MCNC: 4.6 G/DL (ref 3.4–5)
ALBUMIN/GLOB SERPL: 2.2 {RATIO} (ref 1.1–2.2)
ALP SERPL-CCNC: 117 U/L (ref 40–129)
ALT SERPL-CCNC: 49 U/L (ref 10–40)
ANION GAP SERPL CALCULATED.3IONS-SCNC: 13 MMOL/L (ref 3–16)
AST SERPL-CCNC: 41 U/L (ref 15–37)
BASOPHILS # BLD: 0.1 K/UL (ref 0–0.2)
BASOPHILS NFR BLD: 1.5 %
BILIRUB SERPL-MCNC: 0.8 MG/DL (ref 0–1)
BUN SERPL-MCNC: 19 MG/DL (ref 7–20)
CALCIUM SERPL-MCNC: 10.3 MG/DL (ref 8.3–10.6)
CHLORIDE SERPL-SCNC: 98 MMOL/L (ref 99–110)
CO2 SERPL-SCNC: 31 MMOL/L (ref 21–32)
CREAT SERPL-MCNC: 0.9 MG/DL (ref 0.6–1.2)
DEPRECATED RDW RBC AUTO: 13.2 % (ref 12.4–15.4)
EOSINOPHIL # BLD: 0.3 K/UL (ref 0–0.6)
EOSINOPHIL NFR BLD: 3.2 %
EST. AVERAGE GLUCOSE BLD GHB EST-MCNC: 182.9 MG/DL
GFR SERPLBLD CREATININE-BSD FMLA CKD-EPI: 73 ML/MIN/{1.73_M2}
GLUCOSE SERPL-MCNC: 186 MG/DL (ref 70–99)
HBA1C MFR BLD: 8 %
HCT VFR BLD AUTO: 44.3 % (ref 36–48)
HGB BLD-MCNC: 15.8 G/DL (ref 12–16)
LYMPHOCYTES # BLD: 1.8 K/UL (ref 1–5.1)
LYMPHOCYTES NFR BLD: 23.3 %
MCH RBC QN AUTO: 33 PG (ref 26–34)
MCHC RBC AUTO-ENTMCNC: 35.5 G/DL (ref 31–36)
MCV RBC AUTO: 93 FL (ref 80–100)
MONOCYTES # BLD: 0.5 K/UL (ref 0–1.3)
MONOCYTES NFR BLD: 6.6 %
NEUTROPHILS # BLD: 5.2 K/UL (ref 1.7–7.7)
NEUTROPHILS NFR BLD: 65.4 %
PLATELET # BLD AUTO: 303 K/UL (ref 135–450)
PMV BLD AUTO: 9.1 FL (ref 5–10.5)
POTASSIUM SERPL-SCNC: 3.6 MMOL/L (ref 3.5–5.1)
PROT SERPL-MCNC: 6.7 G/DL (ref 6.4–8.2)
RBC # BLD AUTO: 4.77 M/UL (ref 4–5.2)
SODIUM SERPL-SCNC: 142 MMOL/L (ref 136–145)
T4 FREE SERPL-MCNC: 2 NG/DL (ref 0.9–1.8)
TSH SERPL DL<=0.005 MIU/L-ACNC: 0.17 UIU/ML (ref 0.27–4.2)
WBC # BLD AUTO: 7.9 K/UL (ref 4–11)

## 2024-11-22 RX ORDER — LEVOTHYROXINE SODIUM 88 UG/1
88 TABLET ORAL DAILY
Qty: 90 TABLET | Refills: 1 | Status: SHIPPED | OUTPATIENT
Start: 2024-11-22

## 2024-11-22 NOTE — TELEPHONE ENCOUNTER
Sandra advised and verbalized understanding. She states she would like to discuss diabetes results with you at her appt in December.   She denies taking biotin, please send new thyroid dose to [facility].

## 2024-11-22 NOTE — TELEPHONE ENCOUNTER
Done  Orders Placed This Encounter   Medications    levothyroxine (SYNTHROID) 88 MCG tablet     Sig: Take 1 tablet by mouth Daily     Dispense:  90 tablet     Refill:  1     New dose

## 2024-11-22 NOTE — TELEPHONE ENCOUNTER
Her diabetes test is off. But is improved  Would she be willing to start ozempic or similar medicine?  Is there family hx of thyroid cancer?  Has she had pancreas pbs    The medicine can upset stomach we start slow and go up  Continue the other medicines  See in 3 months  What pharmacy?    Some of the liver tests were off     The thyroid test is off. Is she taking vitamins especially biotin? That can cause issue. If not I will need to drop dose of med

## 2024-12-10 ENCOUNTER — OFFICE VISIT (OUTPATIENT)
Dept: FAMILY MEDICINE CLINIC | Age: 59
End: 2024-12-10
Payer: COMMERCIAL

## 2024-12-10 VITALS
SYSTOLIC BLOOD PRESSURE: 120 MMHG | HEART RATE: 88 BPM | TEMPERATURE: 97.4 F | DIASTOLIC BLOOD PRESSURE: 82 MMHG | BODY MASS INDEX: 27.69 KG/M2 | HEIGHT: 67 IN | WEIGHT: 176.4 LBS

## 2024-12-10 DIAGNOSIS — H02.9 EYELID LESION: Primary | ICD-10-CM

## 2024-12-10 DIAGNOSIS — R30.0 DYSURIA: ICD-10-CM

## 2024-12-10 LAB
BILIRUBIN, POC: NEGATIVE
BLOOD URINE, POC: NORMAL
CLARITY, POC: NORMAL
COLOR, POC: NORMAL
GLUCOSE URINE, POC: NEGATIVE MG/DL
KETONES, POC: NEGATIVE MG/DL
LEUKOCYTE EST, POC: NORMAL
NITRITE, POC: NEGATIVE
PH, POC: 7
PROTEIN, POC: NEGATIVE MG/DL
SPECIFIC GRAVITY, POC: 1.01
UROBILINOGEN, POC: 0.2 MG/DL

## 2024-12-10 PROCEDURE — 3079F DIAST BP 80-89 MM HG: CPT | Performed by: FAMILY MEDICINE

## 2024-12-10 PROCEDURE — G8427 DOCREV CUR MEDS BY ELIG CLIN: HCPCS | Performed by: FAMILY MEDICINE

## 2024-12-10 PROCEDURE — G8484 FLU IMMUNIZE NO ADMIN: HCPCS | Performed by: FAMILY MEDICINE

## 2024-12-10 PROCEDURE — G8417 CALC BMI ABV UP PARAM F/U: HCPCS | Performed by: FAMILY MEDICINE

## 2024-12-10 PROCEDURE — 3074F SYST BP LT 130 MM HG: CPT | Performed by: FAMILY MEDICINE

## 2024-12-10 PROCEDURE — 1036F TOBACCO NON-USER: CPT | Performed by: FAMILY MEDICINE

## 2024-12-10 PROCEDURE — 3017F COLORECTAL CA SCREEN DOC REV: CPT | Performed by: FAMILY MEDICINE

## 2024-12-10 PROCEDURE — 81002 URINALYSIS NONAUTO W/O SCOPE: CPT | Performed by: FAMILY MEDICINE

## 2024-12-10 PROCEDURE — 99213 OFFICE O/P EST LOW 20 MIN: CPT | Performed by: FAMILY MEDICINE

## 2024-12-10 RX ORDER — METFORMIN HYDROCHLORIDE 500 MG/1
1000 TABLET, EXTENDED RELEASE ORAL 2 TIMES DAILY WITH MEALS
Qty: 180 TABLET | Refills: 1
Start: 2024-12-10 | End: 2024-12-12 | Stop reason: SDUPTHER

## 2024-12-10 ASSESSMENT — ENCOUNTER SYMPTOMS
SHORTNESS OF BREATH: 0
VOMITING: 0
DIARRHEA: 0
ABDOMINAL PAIN: 0
BLOOD IN STOOL: 0
CONSTIPATION: 0

## 2024-12-10 NOTE — PATIENT INSTRUCTIONS
Take fluids   Do stay with the diet   Continue the medicines   See dr adhikari for the lesion on the left eye lid   See in April

## 2024-12-10 NOTE — PROGRESS NOTES
Subjective:      Patient ID: Sandra Gabriel is a 59 y.o. female.    Chief Complaint   Patient presents with    Follow-up     Hypertension, lipids, thyroid     Urinary Tract Infection     Was having pain but just cloudy urine now        Patient presents with:  Follow-up: Hypertension, lipids, thyroid   Urinary Tract Infection: Was having pain but just cloudy urine now    Here for the above   One week of intermittent dysuria cloudy no blood urine   No frequency   Some cold sx and for 1+ week but she is feeling better  Cough minimal and some head congested       Meds same     Eyelid lesion for a week     YOB: 1965    Date of Visit:  12/10/2024     -- Augmentin [Amoxicillin-Pot Clavulanate] -- Hives   -- Methylprednisolone -- Hives   -- Niaspan [Niacin Er] -- Hives   -- Nitrofuran Derivatives -- Hives    --  hives   -- Percocet [Oxycodone-Acetaminophen] -- Other (See Comments)    --  Dropped her blood pressure to low   -- Skelaxin [Metaxalone] -- Hives   -- Sulfamethoxazole-Trimethoprim -- Hives   -- Ciprofloxacin -- Rash   -- Omnicef -- Rash    Current Outpatient Medications:  levothyroxine (SYNTHROID) 88 MCG tablet, Take 1 tablet by mouth Daily, Disp: 90 tablet, Rfl: 1  vitamin D (ERGOCALCIFEROL) 1.25 MG (62417 UT) CAPS capsule, TAKE 1 CAPSULE BY MOUTH EVERY 30 DAYS, Disp: 12 capsule, Rfl: 3  pantoprazole (PROTONIX) 20 MG tablet, Take 1 tablet by mouth every morning (before breakfast), Disp: 30 tablet, Rfl: 5  metFORMIN (GLUCOPHAGE-XR) 500 MG extended release tablet, Take 1 tablet by mouth 2 times daily (with meals), Disp: 180 tablet, Rfl: 1  rosuvastatin (CRESTOR) 20 MG tablet, TAKE 1 TABLET BY MOUTH DAILY, Disp: 90 tablet, Rfl: 1  chlorthalidone (HYGROTON) 25 MG tablet, TAKE 1 TABLET BY MOUTH DAILY, Disp: 90 tablet, Rfl: 3  azelastine (ASTELIN) 0.1 % nasal spray, USE 1 SPRAY IN EACH NOSTRIL TWICE DAILY AS DIRECTED, Disp: 60 mL, Rfl: 1  Ascorbic Acid (VITAMIN C) 1000 MG tablet, Take 1 tablet by

## 2024-12-11 ENCOUNTER — TELEPHONE (OUTPATIENT)
Dept: FAMILY MEDICINE CLINIC | Age: 59
End: 2024-12-11

## 2024-12-11 NOTE — TELEPHONE ENCOUNTER
Patient tried to pick Metformin up [facility], they told her you put a hold on medication. She still has a few pills at home.    Please advise, [phone]

## 2024-12-12 DIAGNOSIS — Z87.440 RECENT URINARY TRACT INFECTION: Primary | ICD-10-CM

## 2024-12-12 LAB
BACTERIA UR CULT: ABNORMAL
ORGANISM: ABNORMAL

## 2024-12-12 RX ORDER — METFORMIN HYDROCHLORIDE 500 MG/1
1000 TABLET, EXTENDED RELEASE ORAL 2 TIMES DAILY WITH MEALS
Qty: 360 TABLET | Refills: 1 | Status: SHIPPED | OUTPATIENT
Start: 2024-12-12

## 2024-12-12 RX ORDER — DOXYCYCLINE 100 MG/1
100 CAPSULE ORAL 2 TIMES DAILY
Qty: 20 CAPSULE | Refills: 0 | Status: SHIPPED | OUTPATIENT
Start: 2024-12-12 | End: 2024-12-22

## 2024-12-12 RX ORDER — ROSUVASTATIN CALCIUM 20 MG/1
20 TABLET, COATED ORAL DAILY
Qty: 90 TABLET | Refills: 1 | Status: SHIPPED | OUTPATIENT
Start: 2024-12-12

## 2024-12-12 NOTE — TELEPHONE ENCOUNTER
Sent  Orders Placed This Encounter   Medications    metFORMIN (GLUCOPHAGE-XR) 500 MG extended release tablet     Sig: Take 2 tablets by mouth 2 times daily (with meals)     Dispense:  360 tablet     Refill:  1     New dose

## 2024-12-19 ENCOUNTER — TELEPHONE (OUTPATIENT)
Dept: FAMILY MEDICINE CLINIC | Age: 59
End: 2024-12-19

## 2024-12-19 DIAGNOSIS — Z87.440 RECENT URINARY TRACT INFECTION: Primary | ICD-10-CM

## 2024-12-19 NOTE — TELEPHONE ENCOUNTER
Ecoli update  (Newest Message First)  View All Conversations on this Encounter  Rosetta Noble MA routed conversation to You1 minute ago (11:45 AM)     Sandra Gabriel \"Nine-uh\"  P [facility] Staff (supporting Carlos Sanz MD)4 minutes ago (11:42 AM)     Hi Dr. Sanz,     You asked that I let you know how I am doing after a few days.  I'm doing better.  It looks like the urine has cleared up.  I'm just very tired.  My antibiotic ends Sunday.  Should I do another urine test on Monday to make sure it's clear?     Thanks,  Sandra Gabriel

## 2024-12-19 NOTE — TELEPHONE ENCOUNTER
Sandra advised and states she will be leaving for [place] on 12-. Advised Sandra to have urine rechecked on Saturday before she leaves.

## 2024-12-24 ENCOUNTER — TELEPHONE (OUTPATIENT)
Dept: FAMILY MEDICINE CLINIC | Age: 59
End: 2024-12-24

## 2024-12-24 NOTE — TELEPHONE ENCOUNTER
Unfortunately due to her multiple allergy history we do not have options  She will have to go to ER for potential iv treatment with other medicines

## 2024-12-24 NOTE — TELEPHONE ENCOUNTER
Ecoli update  (Newest Message First)  View All Conversations on this Encounter  Sandra Gabriel \"Nine-uh\"  P [facility] Pcp Practice Staff (supporting Carlos Sanz MD)43 minutes ago (6:44 AM)     We'll, I thought I was getting better, but I'm not.  My urine is cloudy again and it burns when I urinate.  What to do?     Thank you,  Sandra

## 2025-02-17 ENCOUNTER — TELEPHONE (OUTPATIENT)
Dept: FAMILY MEDICINE CLINIC | Age: 60
End: 2025-02-17

## 2025-02-17 DIAGNOSIS — R30.0 DYSURIA: Primary | ICD-10-CM

## 2025-02-17 NOTE — TELEPHONE ENCOUNTER
Hi Dr. Sanz,     I've been sick off and on with this Ecoli infection and I am in [place] until April 2.  So, last month I had to go to the hospital in [place], [place], twice.  The first time they sent me home with 3 doses of Fosfomycen.  After all of that i became septic and wss in the hospital for 4 days.  They tried lots of different antibiotics.  They gave me Keflex (cefalexin) a pill, Recephin (ceftriaxone) and Merrem (meropenem) through the IV and then sent me home with Hiprex (methenamine hippurate) and Flomax for 15 days.  I did well with all of them.  But, now I am experiencing burning again when I urinate.  We are now in [place], 3 hours away from that hospital.  Is it possible for you to order a urine culture through [facility], which is very close to me and then order a prescription for me until I get back home?  I feel fine when I'm on the medicine but it just keeps coming back.       Thank you,  Sandra Gabriel

## 2025-02-26 LAB
BACTERIA: NORMAL
BILIRUBIN, URINE: NEGATIVE
BLOOD, URINE: NEGATIVE
CASTS: NORMAL /LPF
CLARITY, UA: CLEAR
COLOR, UA: YELLOW
CULTURE: ABNORMAL
EPITHELIAL CELLS, UA: NORMAL /HPF (ref 0–10)
GLUCOSE URINE: NEGATIVE
KETONES, URINE: NEGATIVE
LEUKOCYTE ESTERASE, URINE: ABNORMAL
LEUKOCYTES, UA: NORMAL /HPF (ref 0–5)
MICROSCOPIC EXAMINATION: ABNORMAL
NITRITE, URINE: NEGATIVE
PH, URINE: 7 (ref 5–7.5)
PROTEIN UA: NEGATIVE
RBC UA: NORMAL /HPF (ref 0–2)
SPECIFIC GRAVITY UA: 1.01 (ref 1–1.03)
UROBILINOGEN, URINE: 0.2 MG/DL (ref 0.2–1)

## 2025-03-17 NOTE — TELEPHONE ENCOUNTER
She has hx of diabetes  She has not been seen for 6 months  She needs check up and review of her recent testing   To decide if any other action needs to be taken Consent: The patient's consent was obtained including but not limited to risks of crusting, scabbing, blistering, scarring, darker or lighter pigmentary change, recurrence, incomplete removal and infection. Show Aperture Variable?: Yes Number Of Freeze-Thaw Cycles: 3 freeze-thaw cycles Render Post-Care Instructions In Note?: no Duration Of Freeze Thaw-Cycle (Seconds): 5 Post-Care Instructions: I reviewed with the patient in detail post-care instructions. Patient is to wear sunprotection, and avoid picking at any of the treated lesions. Pt may apply Vaseline to crusted or scabbing areas. Application Tool (Optional): Cry-AC Detail Level: Detailed

## 2025-03-27 RX ORDER — ROSUVASTATIN CALCIUM 20 MG/1
20 TABLET, COATED ORAL DAILY
Qty: 90 TABLET | Refills: 1 | Status: SHIPPED | OUTPATIENT
Start: 2025-03-27

## 2025-04-01 ENCOUNTER — TELEPHONE (OUTPATIENT)
Dept: FAMILY MEDICINE CLINIC | Age: 60
End: 2025-04-01

## 2025-04-01 DIAGNOSIS — E78.2 MIXED HYPERLIPIDEMIA: ICD-10-CM

## 2025-04-01 DIAGNOSIS — E03.9 ACQUIRED HYPOTHYROIDISM: ICD-10-CM

## 2025-04-01 DIAGNOSIS — E11.9 TYPE 2 DIABETES MELLITUS WITHOUT COMPLICATION, WITHOUT LONG-TERM CURRENT USE OF INSULIN: Primary | ICD-10-CM

## 2025-04-01 NOTE — TELEPHONE ENCOUNTER
Ok done     Diagnosis Orders   1. Type 2 diabetes mellitus without complication, without long-term current use of insulin  Comprehensive Metabolic Panel    Hemoglobin A1C    Lipid Panel      2. Acquired hypothyroidism  TSH    T4, Free      3. Mixed hyperlipidemia  Lipid Panel

## 2025-04-01 NOTE — TELEPHONE ENCOUNTER
Sandra is requesting lab orders to be placed and have done before her appt on 4-.  Please advise.

## 2025-04-03 DIAGNOSIS — E03.9 ACQUIRED HYPOTHYROIDISM: ICD-10-CM

## 2025-04-03 DIAGNOSIS — E11.9 TYPE 2 DIABETES MELLITUS WITHOUT COMPLICATION, WITHOUT LONG-TERM CURRENT USE OF INSULIN: ICD-10-CM

## 2025-04-03 DIAGNOSIS — E78.2 MIXED HYPERLIPIDEMIA: ICD-10-CM

## 2025-04-03 LAB
ALBUMIN SERPL-MCNC: 4.6 G/DL (ref 3.4–5)
ALBUMIN/GLOB SERPL: 2.2 {RATIO} (ref 1.1–2.2)
ALP SERPL-CCNC: 112 U/L (ref 40–129)
ALT SERPL-CCNC: 36 U/L (ref 10–40)
ANION GAP SERPL CALCULATED.3IONS-SCNC: 13 MMOL/L (ref 3–16)
AST SERPL-CCNC: 28 U/L (ref 15–37)
BILIRUB SERPL-MCNC: 0.7 MG/DL (ref 0–1)
BUN SERPL-MCNC: 18 MG/DL (ref 7–20)
CALCIUM SERPL-MCNC: 10.2 MG/DL (ref 8.3–10.6)
CHLORIDE SERPL-SCNC: 100 MMOL/L (ref 99–110)
CHOLEST SERPL-MCNC: 174 MG/DL (ref 0–199)
CO2 SERPL-SCNC: 30 MMOL/L (ref 21–32)
CREAT SERPL-MCNC: 0.8 MG/DL (ref 0.6–1.2)
EST. AVERAGE GLUCOSE BLD GHB EST-MCNC: 137 MG/DL
GFR SERPLBLD CREATININE-BSD FMLA CKD-EPI: 83 ML/MIN/{1.73_M2}
GLUCOSE SERPL-MCNC: 150 MG/DL (ref 70–99)
HBA1C MFR BLD: 6.4 %
HDLC SERPL-MCNC: 43 MG/DL (ref 40–60)
LDLC SERPL CALC-MCNC: 90 MG/DL
POTASSIUM SERPL-SCNC: 4.1 MMOL/L (ref 3.5–5.1)
PROT SERPL-MCNC: 6.7 G/DL (ref 6.4–8.2)
SODIUM SERPL-SCNC: 143 MMOL/L (ref 136–145)
T4 FREE SERPL-MCNC: 2.1 NG/DL (ref 0.9–1.8)
TRIGL SERPL-MCNC: 203 MG/DL (ref 0–150)
TSH SERPL DL<=0.005 MIU/L-ACNC: 0.2 UIU/ML (ref 0.27–4.2)
VLDLC SERPL CALC-MCNC: 41 MG/DL

## 2025-04-03 RX ORDER — LEVOTHYROXINE SODIUM 88 UG/1
88 TABLET ORAL DAILY
Qty: 90 TABLET | Refills: 0 | Status: SHIPPED | OUTPATIENT
Start: 2025-04-03

## 2025-04-04 ENCOUNTER — RESULTS FOLLOW-UP (OUTPATIENT)
Dept: FAMILY MEDICINE CLINIC | Age: 60
End: 2025-04-04

## 2025-04-09 SDOH — ECONOMIC STABILITY: FOOD INSECURITY: WITHIN THE PAST 12 MONTHS, THE FOOD YOU BOUGHT JUST DIDN'T LAST AND YOU DIDN'T HAVE MONEY TO GET MORE.: NEVER TRUE

## 2025-04-09 SDOH — ECONOMIC STABILITY: INCOME INSECURITY: IN THE LAST 12 MONTHS, WAS THERE A TIME WHEN YOU WERE NOT ABLE TO PAY THE MORTGAGE OR RENT ON TIME?: NO

## 2025-04-09 SDOH — ECONOMIC STABILITY: FOOD INSECURITY: WITHIN THE PAST 12 MONTHS, YOU WORRIED THAT YOUR FOOD WOULD RUN OUT BEFORE YOU GOT MONEY TO BUY MORE.: NEVER TRUE

## 2025-04-09 ASSESSMENT — PATIENT HEALTH QUESTIONNAIRE - PHQ9
1. LITTLE INTEREST OR PLEASURE IN DOING THINGS: NOT AT ALL
SUM OF ALL RESPONSES TO PHQ QUESTIONS 1-9: 0
2. FEELING DOWN, DEPRESSED OR HOPELESS: NOT AT ALL
SUM OF ALL RESPONSES TO PHQ QUESTIONS 1-9: 0
1. LITTLE INTEREST OR PLEASURE IN DOING THINGS: NOT AT ALL
2. FEELING DOWN, DEPRESSED OR HOPELESS: NOT AT ALL
SUM OF ALL RESPONSES TO PHQ9 QUESTIONS 1 & 2: 0

## 2025-04-10 ENCOUNTER — OFFICE VISIT (OUTPATIENT)
Dept: FAMILY MEDICINE CLINIC | Age: 60
End: 2025-04-10
Payer: COMMERCIAL

## 2025-04-10 VITALS
SYSTOLIC BLOOD PRESSURE: 124 MMHG | HEIGHT: 67 IN | TEMPERATURE: 97.3 F | WEIGHT: 173.8 LBS | DIASTOLIC BLOOD PRESSURE: 80 MMHG | BODY MASS INDEX: 27.28 KG/M2

## 2025-04-10 DIAGNOSIS — Z87.440 RECENT URINARY TRACT INFECTION: ICD-10-CM

## 2025-04-10 DIAGNOSIS — I10 ESSENTIAL HYPERTENSION: Primary | ICD-10-CM

## 2025-04-10 DIAGNOSIS — E11.9 TYPE 2 DIABETES MELLITUS WITHOUT COMPLICATION, WITHOUT LONG-TERM CURRENT USE OF INSULIN: ICD-10-CM

## 2025-04-10 DIAGNOSIS — R76.8 ELEVATED ANTINUCLEAR ANTIBODY (ANA) LEVEL: ICD-10-CM

## 2025-04-10 DIAGNOSIS — E05.90 HYPERTHYROIDISM: ICD-10-CM

## 2025-04-10 PROCEDURE — 3044F HG A1C LEVEL LT 7.0%: CPT | Performed by: FAMILY MEDICINE

## 2025-04-10 PROCEDURE — 99214 OFFICE O/P EST MOD 30 MIN: CPT | Performed by: FAMILY MEDICINE

## 2025-04-10 PROCEDURE — 3079F DIAST BP 80-89 MM HG: CPT | Performed by: FAMILY MEDICINE

## 2025-04-10 PROCEDURE — 3017F COLORECTAL CA SCREEN DOC REV: CPT | Performed by: FAMILY MEDICINE

## 2025-04-10 PROCEDURE — 2022F DILAT RTA XM EVC RTNOPTHY: CPT | Performed by: FAMILY MEDICINE

## 2025-04-10 PROCEDURE — 1036F TOBACCO NON-USER: CPT | Performed by: FAMILY MEDICINE

## 2025-04-10 PROCEDURE — 3074F SYST BP LT 130 MM HG: CPT | Performed by: FAMILY MEDICINE

## 2025-04-10 PROCEDURE — G8427 DOCREV CUR MEDS BY ELIG CLIN: HCPCS | Performed by: FAMILY MEDICINE

## 2025-04-10 PROCEDURE — G8417 CALC BMI ABV UP PARAM F/U: HCPCS | Performed by: FAMILY MEDICINE

## 2025-04-10 RX ORDER — PANTOPRAZOLE SODIUM 20 MG/1
20 TABLET, DELAYED RELEASE ORAL
Qty: 90 TABLET | Refills: 1 | Status: SHIPPED | OUTPATIENT
Start: 2025-04-10

## 2025-04-10 RX ORDER — LEVOTHYROXINE SODIUM 75 UG/1
75 TABLET ORAL DAILY
Qty: 90 TABLET | Refills: 0 | Status: SHIPPED | OUTPATIENT
Start: 2025-04-10

## 2025-04-10 ASSESSMENT — ENCOUNTER SYMPTOMS
DIARRHEA: 0
SHORTNESS OF BREATH: 0
ABDOMINAL PAIN: 0
CONSTIPATION: 0

## 2025-04-10 NOTE — PATIENT INSTRUCTIONS
Continue diet and the medicines    Drop the thyroid medicine down to 75 mcg.  Recheck the labs in 4 weeks     [phone] dr anthony Avila     Check the feet regular     See the new doctor here in about 4 months

## 2025-04-10 NOTE — PROGRESS NOTES
Subjective:      Patient ID: Sandra Gabriel is a 59 y.o. female.    Chief Complaint   Patient presents with    Follow-up     Hypertension, lipids - pt is not fasting        Patient presents with:  Follow-up: Hypertension, lipids - pt is not fasting    Here for the above and dm   Meds same     No c/o    YOB: 1965    Date of Visit:  4/10/2025     -- Augmentin [Amoxicillin-Pot Clavulanate] -- Hives   -- Methylprednisolone -- Hives   -- Niaspan [Niacin Er] -- Hives   -- Nitrofuran Derivatives -- Hives    --  hives   -- Percocet [Oxycodone-Acetaminophen] -- Other (See Comments)    --  Dropped her blood pressure to low   -- Skelaxin [Metaxalone] -- Hives   -- Sulfamethoxazole-Trimethoprim -- Hives   -- Ciprofloxacin -- Rash   -- Omnicef -- Rash    Current Outpatient Medications:  Probiotic Product (PROBIOTIC DAILY PO), Take by mouth, Disp: , Rfl:   levothyroxine (SYNTHROID) 75 MCG tablet, Take 1 tablet by mouth Daily, NEW DOSE  rosuvastatin (CRESTOR) 20 MG tablet, Take 1 tablet by mouth daily, Disp: 90 tablet, Rfl: 1  pantoprazole (PROTONIX) 20 MG tablet, Take 1 tablet by mouth every morning (before breakfast), Disp: 30 tablet, Rfl: 5  metFORMIN (GLUCOPHAGE-XR) 500 MG extended release tablet, Take 2 tablets by mouth 2 times daily (with meals), Disp: 360 tablet, Rfl: 1  vitamin D (ERGOCALCIFEROL) 1.25 MG (89160 UT) CAPS capsule, TAKE 1 CAPSULE BY MOUTH EVERY 30 DAYS, Disp: 12 capsule, Rfl: 3  chlorthalidone (HYGROTON) 25 MG tablet, TAKE 1 TABLET BY MOUTH DAILY, Disp: 90 tablet, Rfl: 3  azelastine (ASTELIN) 0.1 % nasal spray, USE 1 SPRAY IN EACH NOSTRIL TWICE DAILY AS DIRECTED, Disp: 60 mL, Rfl: 1  Ascorbic Acid (VITAMIN C) 1000 MG tablet, Take 1 tablet by mouth daily, Disp: , Rfl:   albuterol sulfate HFA (VENTOLIN HFA) 108 (90 Base) MCG/ACT inhaler, Inhale 2 puffs into the lungs 4 times daily as needed for Wheezing (or cough) Dispense with spacing device, Disp: 18 g, Rfl: 0  loratadine (CLARITIN) 10 MG

## 2025-04-18 ENCOUNTER — HOSPITAL ENCOUNTER (OUTPATIENT)
Dept: GENERAL RADIOLOGY | Age: 60
Discharge: HOME | End: 2025-04-18
Payer: COMMERCIAL

## 2025-04-18 ENCOUNTER — HOSPITAL ENCOUNTER (OUTPATIENT)
Age: 60
Discharge: HOME | End: 2025-04-18
Payer: COMMERCIAL

## 2025-04-18 DIAGNOSIS — N20.0 CALCULUS OF KIDNEY: ICD-10-CM

## 2025-04-18 PROCEDURE — 74018 RADEX ABDOMEN 1 VIEW: CPT

## 2025-05-13 DIAGNOSIS — E05.90 HYPERTHYROIDISM: ICD-10-CM

## 2025-05-14 ENCOUNTER — RESULTS FOLLOW-UP (OUTPATIENT)
Dept: FAMILY MEDICINE CLINIC | Age: 60
End: 2025-05-14

## 2025-05-14 LAB
T4 FREE SERPL-MCNC: 1.7 NG/DL (ref 0.9–1.8)
TSH SERPL DL<=0.005 MIU/L-ACNC: 1.6 UIU/ML (ref 0.27–4.2)

## 2025-07-07 RX ORDER — ROSUVASTATIN CALCIUM 20 MG/1
20 TABLET, COATED ORAL DAILY
Qty: 90 TABLET | Refills: 0 | Status: SHIPPED | OUTPATIENT
Start: 2025-07-07

## 2025-07-07 RX ORDER — METFORMIN HYDROCHLORIDE 500 MG/1
1000 TABLET, EXTENDED RELEASE ORAL 2 TIMES DAILY WITH MEALS
Qty: 360 TABLET | Refills: 0 | Status: SHIPPED | OUTPATIENT
Start: 2025-07-07

## 2025-07-07 RX ORDER — LEVOTHYROXINE SODIUM 75 UG/1
75 TABLET ORAL DAILY
Qty: 90 TABLET | Refills: 0 | Status: SHIPPED | OUTPATIENT
Start: 2025-07-07

## 2025-07-07 NOTE — TELEPHONE ENCOUNTER
PT IS REQUESTING MEDICATIONS :    rosuvastatin (CRESTOR) 20 MG tablet 90 tablet 1 4/2/2025 --    Sig - Route: Take 1 tablet by mouth daily - Oral    Sent to pharmacy as: Rosuvastatin Calcium 20 MG Oral Tablet (CRESTOR)    Notes to Pharmacy: **Patient requests 90 days supply**    E-Prescribing Status: Receipt confirmed by pharmacy (4/2/2025 11:02 AM EDT)       Disp Refills Start End    levothyroxine (SYNTHROID) 75 MCG tablet 90 tablet 0 4/10/2025 --    Sig - Route: Take 1 tablet by mouth Daily - Oral    Sent to pharmacy as: Levothyroxine Sodium 75 MCG Oral Tablet (SYNTHROID)    Notes to Pharmacy: New dose    E-Prescribing Status: Receipt confirmed by pharmacy (4/10/2025 10:31 AM EDT)       Disp Refills Start End    metFORMIN (GLUCOPHAGE-XR) 500 MG extended release tablet 360 tablet 1 12/12/2024 --    Sig - Route: Take 2 tablets by mouth 2 times daily (with meals) - Oral    Sent to pharmacy as: metFORMIN HCl  MG Oral Tablet Extended Release 24 Hour (GLUCOPHAGE-XR)    Notes to Pharmacy: New dose    E-Prescribing Status: Receipt confirmed by pharmacy (12/12/2024  2:57 PM EST)        QTY LEFT : 3     Pharmacy    [id number] - [place] - [address] - P [phone] - F [phone]  [address] [place] [address]  Phone: [phone]  Fax: [phone]       LAST OV : 04/10/2025    NEXT OV : 08/05/2025 MIKE